# Patient Record
Sex: FEMALE | Race: WHITE | NOT HISPANIC OR LATINO
[De-identification: names, ages, dates, MRNs, and addresses within clinical notes are randomized per-mention and may not be internally consistent; named-entity substitution may affect disease eponyms.]

---

## 2017-10-05 ENCOUNTER — RX RENEWAL (OUTPATIENT)
Age: 72
End: 2017-10-05

## 2017-10-13 ENCOUNTER — APPOINTMENT (OUTPATIENT)
Dept: FAMILY MEDICINE | Facility: CLINIC | Age: 72
End: 2017-10-13
Payer: MEDICARE

## 2017-10-13 VITALS
OXYGEN SATURATION: 98 % | DIASTOLIC BLOOD PRESSURE: 80 MMHG | RESPIRATION RATE: 16 BRPM | SYSTOLIC BLOOD PRESSURE: 148 MMHG | HEART RATE: 98 BPM | BODY MASS INDEX: 19.66 KG/M2 | HEIGHT: 65 IN | TEMPERATURE: 98.6 F | WEIGHT: 118 LBS

## 2017-10-13 VITALS — DIASTOLIC BLOOD PRESSURE: 72 MMHG | SYSTOLIC BLOOD PRESSURE: 132 MMHG

## 2017-10-13 PROCEDURE — 99213 OFFICE O/P EST LOW 20 MIN: CPT

## 2017-10-13 RX ORDER — METOPROLOL SUCCINATE 25 MG/1
25 TABLET, EXTENDED RELEASE ORAL
Qty: 90 | Refills: 0 | Status: ACTIVE | COMMUNITY
Start: 2016-10-21

## 2017-10-19 ENCOUNTER — RX RENEWAL (OUTPATIENT)
Age: 72
End: 2017-10-19

## 2017-10-31 ENCOUNTER — RX RENEWAL (OUTPATIENT)
Age: 72
End: 2017-10-31

## 2017-11-08 ENCOUNTER — RX RENEWAL (OUTPATIENT)
Age: 72
End: 2017-11-08

## 2017-11-09 ENCOUNTER — RX RENEWAL (OUTPATIENT)
Age: 72
End: 2017-11-09

## 2017-12-22 ENCOUNTER — APPOINTMENT (OUTPATIENT)
Dept: FAMILY MEDICINE | Facility: CLINIC | Age: 72
End: 2017-12-22
Payer: MEDICARE

## 2017-12-22 VITALS
DIASTOLIC BLOOD PRESSURE: 84 MMHG | BODY MASS INDEX: 19.66 KG/M2 | HEART RATE: 83 BPM | RESPIRATION RATE: 16 BRPM | OXYGEN SATURATION: 98 % | WEIGHT: 118 LBS | HEIGHT: 65 IN | SYSTOLIC BLOOD PRESSURE: 158 MMHG

## 2017-12-22 VITALS — DIASTOLIC BLOOD PRESSURE: 80 MMHG | SYSTOLIC BLOOD PRESSURE: 136 MMHG

## 2017-12-22 PROCEDURE — 99213 OFFICE O/P EST LOW 20 MIN: CPT

## 2018-01-03 ENCOUNTER — CLINICAL ADVICE (OUTPATIENT)
Age: 73
End: 2018-01-03

## 2018-01-08 ENCOUNTER — CLINICAL ADVICE (OUTPATIENT)
Age: 73
End: 2018-01-08

## 2018-01-08 DIAGNOSIS — J45.20 MILD INTERMITTENT ASTHMA, UNCOMPLICATED: ICD-10-CM

## 2018-01-18 ENCOUNTER — CLINICAL ADVICE (OUTPATIENT)
Age: 73
End: 2018-01-18

## 2018-01-31 ENCOUNTER — RX RENEWAL (OUTPATIENT)
Age: 73
End: 2018-01-31

## 2018-02-05 ENCOUNTER — RX RENEWAL (OUTPATIENT)
Age: 73
End: 2018-02-05

## 2018-02-20 ENCOUNTER — RX RENEWAL (OUTPATIENT)
Age: 73
End: 2018-02-20

## 2018-03-01 ENCOUNTER — APPOINTMENT (OUTPATIENT)
Dept: FAMILY MEDICINE | Facility: CLINIC | Age: 73
End: 2018-03-01
Payer: MEDICARE

## 2018-03-01 VITALS
HEIGHT: 65 IN | BODY MASS INDEX: 19.66 KG/M2 | WEIGHT: 118 LBS | OXYGEN SATURATION: 98 % | DIASTOLIC BLOOD PRESSURE: 84 MMHG | HEART RATE: 76 BPM | RESPIRATION RATE: 14 BRPM | SYSTOLIC BLOOD PRESSURE: 144 MMHG

## 2018-03-01 VITALS — DIASTOLIC BLOOD PRESSURE: 72 MMHG | SYSTOLIC BLOOD PRESSURE: 132 MMHG

## 2018-03-01 PROCEDURE — 99213 OFFICE O/P EST LOW 20 MIN: CPT

## 2018-03-07 ENCOUNTER — RX RENEWAL (OUTPATIENT)
Age: 73
End: 2018-03-07

## 2018-04-04 ENCOUNTER — CLINICAL ADVICE (OUTPATIENT)
Age: 73
End: 2018-04-04

## 2018-04-09 ENCOUNTER — RX RENEWAL (OUTPATIENT)
Age: 73
End: 2018-04-09

## 2018-04-09 DIAGNOSIS — L20.9 ATOPIC DERMATITIS, UNSPECIFIED: ICD-10-CM

## 2018-04-23 ENCOUNTER — RX RENEWAL (OUTPATIENT)
Age: 73
End: 2018-04-23

## 2018-05-08 ENCOUNTER — APPOINTMENT (OUTPATIENT)
Dept: FAMILY MEDICINE | Facility: CLINIC | Age: 73
End: 2018-05-08
Payer: MEDICARE

## 2018-05-08 VITALS
HEART RATE: 69 BPM | HEIGHT: 65 IN | OXYGEN SATURATION: 98 % | RESPIRATION RATE: 14 BRPM | SYSTOLIC BLOOD PRESSURE: 134 MMHG | DIASTOLIC BLOOD PRESSURE: 82 MMHG | BODY MASS INDEX: 19.66 KG/M2 | WEIGHT: 118 LBS

## 2018-05-08 PROCEDURE — 99213 OFFICE O/P EST LOW 20 MIN: CPT

## 2018-05-08 RX ORDER — TOBRAMYCIN 3 MG/ML
0.3 SOLUTION/ DROPS OPHTHALMIC 3 TIMES DAILY
Qty: 10 | Refills: 0 | Status: DISCONTINUED | COMMUNITY
Start: 2017-10-19 | End: 2018-05-08

## 2018-05-08 RX ORDER — SULFAMETHOXAZOLE AND TRIMETHOPRIM 800; 160 MG/1; MG/1
800-160 TABLET ORAL TWICE DAILY
Qty: 28 | Refills: 1 | Status: DISCONTINUED | COMMUNITY
Start: 2017-10-13 | End: 2018-05-08

## 2018-05-08 RX ORDER — SULFAMETHOXAZOLE AND TRIMETHOPRIM 800; 160 MG/1; MG/1
800-160 TABLET ORAL TWICE DAILY
Qty: 28 | Refills: 1 | Status: DISCONTINUED | COMMUNITY
Start: 2017-05-20 | End: 2018-05-08

## 2018-05-08 RX ORDER — PROCHLORPERAZINE MALEATE 10 MG/1
10 TABLET ORAL
Qty: 30 | Refills: 0 | Status: DISCONTINUED | COMMUNITY
Start: 2017-02-27 | End: 2018-05-08

## 2018-05-18 ENCOUNTER — RX RENEWAL (OUTPATIENT)
Age: 73
End: 2018-05-18

## 2018-05-23 ENCOUNTER — APPOINTMENT (OUTPATIENT)
Dept: FAMILY MEDICINE | Facility: CLINIC | Age: 73
End: 2018-05-23
Payer: MEDICARE

## 2018-05-23 VITALS
BODY MASS INDEX: 19.99 KG/M2 | HEART RATE: 77 BPM | OXYGEN SATURATION: 97 % | WEIGHT: 120 LBS | DIASTOLIC BLOOD PRESSURE: 74 MMHG | SYSTOLIC BLOOD PRESSURE: 138 MMHG | RESPIRATION RATE: 14 BRPM | HEIGHT: 65 IN

## 2018-05-23 PROCEDURE — 99213 OFFICE O/P EST LOW 20 MIN: CPT

## 2018-05-23 RX ORDER — PREDNISONE 20 MG/1
20 TABLET ORAL
Qty: 6 | Refills: 0 | Status: DISCONTINUED | COMMUNITY
Start: 2018-01-14 | End: 2018-05-23

## 2018-05-23 RX ORDER — CEPHALEXIN 500 MG/1
500 CAPSULE ORAL 4 TIMES DAILY
Qty: 56 | Refills: 1 | Status: DISCONTINUED | COMMUNITY
Start: 2017-10-05 | End: 2018-05-23

## 2018-05-23 RX ORDER — SULFAMETHOXAZOLE AND TRIMETHOPRIM 800; 160 MG/1; MG/1
800-160 TABLET ORAL TWICE DAILY
Qty: 28 | Refills: 1 | Status: COMPLETED | COMMUNITY
Start: 2018-05-23 | End: 2018-06-20

## 2018-05-23 NOTE — HISTORY OF PRESENT ILLNESS
[FreeTextEntry8] : pt presents for hot st, ha, hard to breath used inhaler for past 2 days and doesn’t feel so tight, losing her voice. x2days.  She has been taking the cephalexin.  She has a sore throat for the past 2 days.  She had tightness in her chest but used the symbicort and proair and is breathing easier.  Her ears feel clogged

## 2018-05-23 NOTE — ASSESSMENT
[FreeTextEntry1] : she will stop the cephalexin and start bactrim and continue other medications without change, she was reminded to schedule a screening mammogram and was given the stool FIT kit

## 2018-05-23 NOTE — PHYSICAL EXAM
[No Acute Distress] : no acute distress [Well Nourished] : well nourished [Well Developed] : well developed [Well-Appearing] : well-appearing [Normal Voice/Communication] : normal voice/communication [Normal Sclera/Conjunctiva] : normal sclera/conjunctiva [Supple] : supple [No Lymphadenopathy] : no lymphadenopathy [No Respiratory Distress] : no respiratory distress  [Clear to Auscultation] : lungs were clear to auscultation bilaterally [No Accessory Muscle Use] : no accessory muscle use [Normal Rate] : normal rate  [Regular Rhythm] : with a regular rhythm [Normal S1, S2] : normal S1 and S2 [Speech Grossly Normal] : speech grossly normal [Memory Grossly Normal] : memory grossly normal [Normal Affect] : the affect was normal [Normal Mood] : the mood was normal [Normal Insight/Judgement] : insight and judgment were intact [de-identified] : TM's dull, decreased light reflexes, mild erythema of posterior pharynx

## 2018-05-23 NOTE — REVIEW OF SYSTEMS
[Chills] : chills [Fatigue] : fatigue [Earache] : earache [Hoarseness] : hoarseness [Nasal Discharge] : nasal discharge [Sore Throat] : sore throat [Shortness Of Breath] : shortness of breath [Wheezing] : wheezing [Cough] : cough [Negative] : Heme/Lymph [Fever] : no fever [Dyspnea on Exertion] : no dyspnea on exertion

## 2018-05-31 ENCOUNTER — RX RENEWAL (OUTPATIENT)
Age: 73
End: 2018-05-31

## 2018-06-27 ENCOUNTER — RX RENEWAL (OUTPATIENT)
Age: 73
End: 2018-06-27

## 2018-07-17 ENCOUNTER — RX RENEWAL (OUTPATIENT)
Age: 73
End: 2018-07-17

## 2018-07-17 RX ORDER — SULFAMETHOXAZOLE AND TRIMETHOPRIM 800; 160 MG/1; MG/1
800-160 TABLET ORAL TWICE DAILY
Qty: 28 | Refills: 1 | Status: COMPLETED | COMMUNITY
Start: 2018-07-17 | End: 2018-08-14

## 2018-08-29 ENCOUNTER — APPOINTMENT (OUTPATIENT)
Dept: FAMILY MEDICINE | Facility: CLINIC | Age: 73
End: 2018-08-29
Payer: MEDICARE

## 2018-08-29 VITALS
TEMPERATURE: 98.6 F | SYSTOLIC BLOOD PRESSURE: 128 MMHG | WEIGHT: 120 LBS | HEART RATE: 72 BPM | RESPIRATION RATE: 14 BRPM | DIASTOLIC BLOOD PRESSURE: 70 MMHG | BODY MASS INDEX: 19.97 KG/M2 | OXYGEN SATURATION: 97 %

## 2018-08-29 DIAGNOSIS — Z87.09 PERSONAL HISTORY OF OTHER DISEASES OF THE RESPIRATORY SYSTEM: ICD-10-CM

## 2018-08-29 LAB — CYTOLOGY CVX/VAG DOC THIN PREP: NORMAL

## 2018-08-29 PROCEDURE — 99213 OFFICE O/P EST LOW 20 MIN: CPT

## 2018-08-29 RX ORDER — SULFAMETHOXAZOLE AND TRIMETHOPRIM 800; 160 MG/1; MG/1
800-160 TABLET ORAL TWICE DAILY
Qty: 28 | Refills: 1 | Status: COMPLETED | COMMUNITY
Start: 2018-08-29 | End: 2018-09-26

## 2018-08-29 RX ORDER — CEPHALEXIN 500 MG/1
500 CAPSULE ORAL 4 TIMES DAILY
Qty: 56 | Refills: 1 | Status: DISCONTINUED | COMMUNITY
Start: 2018-06-27 | End: 2018-08-29

## 2018-08-29 NOTE — HISTORY OF PRESENT ILLNESS
[FreeTextEntry8] : Pt is here for poss sinus infection. Pt c/o sinus pressure, burning throat, scratchy voice on/off for about 2 months. For the past 2 months she has had intermittent pressure in her face and burning in her throat.  She isn't taking the cephalexin but did start the Bactrim a few days ago.  She plans to schedule a mammogram and perform the stool FIT test.  The cardiologist ordered routine labs for her.

## 2018-08-29 NOTE — PHYSICAL EXAM
[No Acute Distress] : no acute distress [Well Nourished] : well nourished [Well Developed] : well developed [Well-Appearing] : well-appearing [Normal Voice/Communication] : normal voice/communication [Normal Sclera/Conjunctiva] : normal sclera/conjunctiva [Normal Outer Ear/Nose] : the outer ears and nose were normal in appearance [Supple] : supple [No Lymphadenopathy] : no lymphadenopathy [No Respiratory Distress] : no respiratory distress  [Clear to Auscultation] : lungs were clear to auscultation bilaterally [No Accessory Muscle Use] : no accessory muscle use [Normal Rate] : normal rate  [Regular Rhythm] : with a regular rhythm [Normal S1, S2] : normal S1 and S2 [Speech Grossly Normal] : speech grossly normal [Memory Grossly Normal] : memory grossly normal [Normal Affect] : the affect was normal [Normal Mood] : the mood was normal [Normal Insight/Judgement] : insight and judgment were intact [de-identified] : TM's dull bilaterally,mild swelling and redness in nasal passages bilaterally, mild erythema of posterior pharynx

## 2018-08-29 NOTE — REVIEW OF SYSTEMS
[Earache] : earache [Nasal Discharge] : nasal discharge [Sore Throat] : sore throat [Postnasal Drip] : postnasal drip [Headache] : headache [Negative] : Heme/Lymph [Fever] : no fever [Chills] : no chills [Fatigue] : no fatigue [Shortness Of Breath] : no shortness of breath [Wheezing] : no wheezing [Cough] : no cough

## 2018-09-07 ENCOUNTER — RX RENEWAL (OUTPATIENT)
Age: 73
End: 2018-09-07

## 2018-09-15 ENCOUNTER — RX RENEWAL (OUTPATIENT)
Age: 73
End: 2018-09-15

## 2018-09-17 ENCOUNTER — CLINICAL ADVICE (OUTPATIENT)
Age: 73
End: 2018-09-17

## 2018-09-19 ENCOUNTER — CLINICAL ADVICE (OUTPATIENT)
Age: 73
End: 2018-09-19

## 2018-10-06 ENCOUNTER — RX RENEWAL (OUTPATIENT)
Age: 73
End: 2018-10-06

## 2018-10-08 ENCOUNTER — CLINICAL ADVICE (OUTPATIENT)
Age: 73
End: 2018-10-08

## 2018-10-10 ENCOUNTER — APPOINTMENT (OUTPATIENT)
Dept: FAMILY MEDICINE | Facility: CLINIC | Age: 73
End: 2018-10-10
Payer: MEDICARE

## 2018-10-10 VITALS
DIASTOLIC BLOOD PRESSURE: 60 MMHG | OXYGEN SATURATION: 96 % | HEART RATE: 50 BPM | WEIGHT: 115 LBS | BODY MASS INDEX: 19.14 KG/M2 | RESPIRATION RATE: 14 BRPM | SYSTOLIC BLOOD PRESSURE: 132 MMHG

## 2018-10-10 VITALS — DIASTOLIC BLOOD PRESSURE: 70 MMHG | SYSTOLIC BLOOD PRESSURE: 130 MMHG

## 2018-10-10 VITALS — HEART RATE: 72 BPM

## 2018-10-10 DIAGNOSIS — Z87.898 PERSONAL HISTORY OF OTHER SPECIFIED CONDITIONS: ICD-10-CM

## 2018-10-10 DIAGNOSIS — Z95.0 PRESENCE OF CARDIAC PACEMAKER: ICD-10-CM

## 2018-10-10 LAB
BILIRUB UR QL STRIP: NORMAL
GLUCOSE UR-MCNC: NORMAL
HCG UR QL: 0.2 EU/DL
HGB UR QL STRIP.AUTO: NORMAL
KETONES UR-MCNC: NORMAL
LEUKOCYTE ESTERASE UR QL STRIP: NORMAL
NITRITE UR QL STRIP: NORMAL
PH UR STRIP: 5.5
PROT UR STRIP-MCNC: NORMAL
SP GR UR STRIP: 1.01

## 2018-10-10 PROCEDURE — 99495 TRANSJ CARE MGMT MOD F2F 14D: CPT | Mod: 25

## 2018-10-10 PROCEDURE — 81003 URINALYSIS AUTO W/O SCOPE: CPT | Mod: QW

## 2018-10-10 RX ORDER — BUDESONIDE AND FORMOTEROL FUMARATE DIHYDRATE 80; 4.5 UG/1; UG/1
80-4.5 AEROSOL RESPIRATORY (INHALATION) TWICE DAILY
Qty: 1 | Refills: 5 | Status: DISCONTINUED | COMMUNITY
Start: 2018-01-08 | End: 2018-10-10

## 2018-10-10 RX ORDER — SULFAMETHOXAZOLE AND TRIMETHOPRIM 800; 160 MG/1; MG/1
800-160 TABLET ORAL TWICE DAILY
Qty: 14 | Refills: 0 | Status: DISCONTINUED | COMMUNITY
Start: 2018-10-06 | End: 2018-10-10

## 2018-10-10 NOTE — PHYSICAL EXAM
[No Acute Distress] : no acute distress [Well Nourished] : well nourished [Well Developed] : well developed [Well-Appearing] : well-appearing [Normal Voice/Communication] : normal voice/communication [No Respiratory Distress] : no respiratory distress  [Clear to Auscultation] : lungs were clear to auscultation bilaterally [No Accessory Muscle Use] : no accessory muscle use [Normal Rate] : normal rate  [Regular Rhythm] : with a regular rhythm [Normal S1, S2] : normal S1 and S2 [No Murmur] : no murmur heard [No Carotid Bruits] : no carotid bruits [No Edema] : there was no peripheral edema [Soft] : abdomen soft [Non Tender] : non-tender [Non-distended] : non-distended [No Masses] : no abdominal mass palpated [No HSM] : no HSM [Normal Bowel Sounds] : normal bowel sounds [No Hernias] : no hernias [No CVA Tenderness] : no CVA  tenderness [Normal Gait] : normal gait [Speech Grossly Normal] : speech grossly normal [Memory Grossly Normal] : memory grossly normal [Normal Affect] : the affect was normal [Normal Mood] : the mood was normal [Normal Insight/Judgement] : insight and judgment were intact [de-identified] : healed surgical incision in left upper chest

## 2018-10-10 NOTE — HISTORY OF PRESENT ILLNESS
[FreeTextEntry1] : Pt is here for a HFU. Pt went to [facility] on 9/11/2018 for pacemaker and was discharged on 9/14/2018. Pt is also here for a UTI. Pt is c/o bleeding that subsided and pelvic pain for about for 4 days.  [de-identified] : She was seen at [facility] in september due to persistant low heart rate when she was checking it at the pharmacy.  She was admitted and had a pacemaker placed.  She just had a CXR as one of the leads may be displaced.  Her recent BP's are in the 120-130/60 range and her heart rate is in the 70-90 range. The urinary symptoms and pain in her bladder are slowly improving.

## 2018-10-10 NOTE — ASSESSMENT
[FreeTextEntry1] : urine C&S was ordered, I renewed the cephalexin, she will start cranberry pills, no other medication changes were made, she will go for her mammogram when the pacemaker incision is more healed, she will do the stool FIT kit and follow up in 3 months or sooner prn

## 2018-10-10 NOTE — REVIEW OF SYSTEMS
[Dysuria] : dysuria [Hematuria] : hematuria [Frequency] : frequency [Anxiety] : anxiety [Negative] : Heme/Lymph [Incontinence] : no incontinence [FreeTextEntry5] : slow heart rate

## 2018-10-12 LAB — BACTERIA UR CULT: NORMAL

## 2018-10-13 ENCOUNTER — CLINICAL ADVICE (OUTPATIENT)
Age: 73
End: 2018-10-13

## 2018-10-15 ENCOUNTER — CLINICAL ADVICE (OUTPATIENT)
Age: 73
End: 2018-10-15

## 2018-10-18 ENCOUNTER — CLINICAL ADVICE (OUTPATIENT)
Age: 73
End: 2018-10-18

## 2018-10-20 ENCOUNTER — CLINICAL ADVICE (OUTPATIENT)
Age: 73
End: 2018-10-20

## 2018-10-25 ENCOUNTER — CLINICAL ADVICE (OUTPATIENT)
Age: 73
End: 2018-10-25

## 2018-10-27 ENCOUNTER — RX RENEWAL (OUTPATIENT)
Age: 73
End: 2018-10-27

## 2018-10-29 ENCOUNTER — CLINICAL ADVICE (OUTPATIENT)
Age: 73
End: 2018-10-29

## 2018-10-31 ENCOUNTER — CLINICAL ADVICE (OUTPATIENT)
Age: 73
End: 2018-10-31

## 2018-11-06 ENCOUNTER — APPOINTMENT (OUTPATIENT)
Dept: FAMILY MEDICINE | Facility: CLINIC | Age: 73
End: 2018-11-06
Payer: MEDICARE

## 2018-11-06 VITALS
RESPIRATION RATE: 14 BRPM | WEIGHT: 112 LBS | DIASTOLIC BLOOD PRESSURE: 70 MMHG | HEART RATE: 69 BPM | SYSTOLIC BLOOD PRESSURE: 142 MMHG | BODY MASS INDEX: 18.64 KG/M2 | OXYGEN SATURATION: 95 %

## 2018-11-06 LAB
BILIRUB UR QL STRIP: NORMAL
GLUCOSE UR-MCNC: NORMAL
HCG UR QL: 0.2 EU/DL
HGB UR QL STRIP.AUTO: NORMAL
KETONES UR-MCNC: NORMAL
LEUKOCYTE ESTERASE UR QL STRIP: NORMAL
NITRITE UR QL STRIP: NORMAL
PH UR STRIP: 5.5
PROT UR STRIP-MCNC: NORMAL
SP GR UR STRIP: 1

## 2018-11-06 PROCEDURE — 81003 URINALYSIS AUTO W/O SCOPE: CPT | Mod: QW

## 2018-11-06 PROCEDURE — 99213 OFFICE O/P EST LOW 20 MIN: CPT | Mod: 25

## 2018-11-06 NOTE — ASSESSMENT
[FreeTextEntry1] : she was advised to continue the cefdinir bid and to add Bactrim DS 1 daily in the evening, she was given emotional support and encouraged to eat every few hours, she will follow up with the urologist as scheduled and may call me for an extension of the antibiotics if needed

## 2018-11-06 NOTE — PHYSICAL EXAM
[No Acute Distress] : no acute distress [Well Developed] : well developed [Normal Sclera/Conjunctiva] : normal sclera/conjunctiva [Normal Oropharynx] : the oropharynx was normal [Supple] : supple [No Lymphadenopathy] : no lymphadenopathy [No Respiratory Distress] : no respiratory distress  [Clear to Auscultation] : lungs were clear to auscultation bilaterally [No Accessory Muscle Use] : no accessory muscle use [Normal Rate] : normal rate  [Regular Rhythm] : with a regular rhythm [Normal S1, S2] : normal S1 and S2 [Normal Gait] : normal gait [de-identified] : TM's dull, decreased light reflexes bilaterally [de-identified] : she is anxious

## 2018-11-06 NOTE — REVIEW OF SYSTEMS
[Chills] : chills [Fatigue] : fatigue [Recent Change In Weight] : ~T recent weight change [Earache] : earache [Nasal Discharge] : nasal discharge [Postnasal Drip] : postnasal drip [Abdominal Pain] : abdominal pain [Dysuria] : dysuria [Hematuria] : hematuria [Frequency] : frequency [Joint Pain] : joint pain [Muscle Weakness] : muscle weakness [Muscle Pain] : muscle pain [Dizziness] : dizziness [Anxiety] : anxiety [Negative] : Heme/Lymph [FreeTextEntry2] : weight los [FreeTextEntry7] : decreaed appetite

## 2018-11-06 NOTE — HISTORY OF PRESENT ILLNESS
[FreeTextEntry1] : Pt is here for poss ear infection. Pt is c/o bilateral ear pain and fluid build up for about 2 days. Pt would like urine rechecked.  [de-identified] : Her ears are feeling clogged.  She is taking the cefdinir.  The bladder and leg pain is slowly improving.  She is scheduled for the cystoscopy on friday and had the CT of the kidneys done at [facility] yesterday.  She lost 8 pounds since being in the hospital for the pacemaker.  She doesn't have a good appetite.

## 2018-11-12 ENCOUNTER — RX RENEWAL (OUTPATIENT)
Age: 73
End: 2018-11-12

## 2018-11-26 ENCOUNTER — APPOINTMENT (OUTPATIENT)
Dept: FAMILY MEDICINE | Facility: CLINIC | Age: 73
End: 2018-11-26
Payer: MEDICARE

## 2018-11-26 VITALS
OXYGEN SATURATION: 97 % | SYSTOLIC BLOOD PRESSURE: 122 MMHG | RESPIRATION RATE: 14 BRPM | DIASTOLIC BLOOD PRESSURE: 70 MMHG | HEART RATE: 83 BPM

## 2018-11-26 PROCEDURE — 99213 OFFICE O/P EST LOW 20 MIN: CPT | Mod: 25

## 2018-11-26 PROCEDURE — 96372 THER/PROPH/DIAG INJ SC/IM: CPT

## 2018-11-26 RX ORDER — CEPHALEXIN 500 MG/1
500 CAPSULE ORAL 4 TIMES DAILY
Qty: 56 | Refills: 1 | Status: DISCONTINUED | COMMUNITY
Start: 2018-10-10 | End: 2018-11-26

## 2018-11-26 RX ORDER — CYANOCOBALAMIN 1000 UG/ML
1000 INJECTION INTRAMUSCULAR; SUBCUTANEOUS
Qty: 0 | Refills: 0 | Status: COMPLETED | OUTPATIENT
Start: 2018-11-26

## 2018-11-26 RX ADMIN — CYANOCOBALAMIN 0 MCG/ML: 1000 INJECTION INTRAMUSCULAR; SUBCUTANEOUS at 00:00

## 2018-11-26 NOTE — HISTORY OF PRESENT ILLNESS
[FreeTextEntry8] : pt c/o ear aches +sinus pressure -cough -st -congestion -temp x 2 weeks  She is tolerating the klonopin and sertraline so far.  She has been forcing herself to eat.  Her weight at home was 105.  She has been constipated and is taking metamucil.  She still has some fluid in the right ear and sinus congestion.  She is on cefdinir.  She is scheduled for the cystoscopy on friday.  She started mental health counseling last wednesday.

## 2018-11-26 NOTE — REVIEW OF SYSTEMS
[Fever] : no fever [Chills] : no chills [Fatigue] : fatigue [Recent Change In Weight] : ~T recent weight change [Earache] : earache [Nasal Discharge] : nasal discharge [Sore Throat] : sore throat [Postnasal Drip] : postnasal drip [Shortness Of Breath] : no shortness of breath [Wheezing] : no wheezing [Cough] : no cough [Suicidal] : not suicidal [Anxiety] : anxiety [Depression] : depression [Negative] : Heme/Lymph [FreeTextEntry2] : weight loss [FreeTextEntry8] : pain in bladder area

## 2018-11-26 NOTE — PHYSICAL EXAM
[No Acute Distress] : no acute distress [Well Developed] : well developed [Normal Sclera/Conjunctiva] : normal sclera/conjunctiva [Normal Oropharynx] : the oropharynx was normal [Supple] : supple [No Lymphadenopathy] : no lymphadenopathy [No Respiratory Distress] : no respiratory distress  [Clear to Auscultation] : lungs were clear to auscultation bilaterally [No Accessory Muscle Use] : no accessory muscle use [Normal Rate] : normal rate  [Regular Rhythm] : with a regular rhythm [Normal S1, S2] : normal S1 and S2 [Memory Grossly Normal] : memory grossly normal [Normal Affect] : the affect was normal [Normal Insight/Judgement] : insight and judgment were intact [de-identified] : TM's dull, redness and swelling in nasal passages, worse on right [de-identified] : she is mildly anxious

## 2018-11-26 NOTE — ASSESSMENT
[FreeTextEntry1] : she was advised to stop the cefdinir and to take Bactrim, she will follow up with the urologist and psychiatric providers as scheduled, she was given emotional support, B12 injection was given and she will follow up if symptoms persist or worsen

## 2018-11-28 ENCOUNTER — RX RENEWAL (OUTPATIENT)
Age: 73
End: 2018-11-28

## 2018-12-01 ENCOUNTER — CLINICAL ADVICE (OUTPATIENT)
Age: 73
End: 2018-12-01

## 2018-12-10 ENCOUNTER — RX RENEWAL (OUTPATIENT)
Age: 73
End: 2018-12-10

## 2018-12-11 ENCOUNTER — APPOINTMENT (OUTPATIENT)
Dept: FAMILY MEDICINE | Facility: CLINIC | Age: 73
End: 2018-12-11
Payer: MEDICARE

## 2018-12-11 VITALS
DIASTOLIC BLOOD PRESSURE: 60 MMHG | OXYGEN SATURATION: 96 % | HEIGHT: 65 IN | HEART RATE: 72 BPM | BODY MASS INDEX: 16.5 KG/M2 | WEIGHT: 99 LBS | SYSTOLIC BLOOD PRESSURE: 120 MMHG | RESPIRATION RATE: 14 BRPM

## 2018-12-11 PROCEDURE — 92567 TYMPANOMETRY: CPT

## 2018-12-11 PROCEDURE — 99213 OFFICE O/P EST LOW 20 MIN: CPT | Mod: 25

## 2018-12-11 NOTE — HEALTH RISK ASSESSMENT
[Intercurrent hospitalizations] : was admitted to the hospital  [] : No [de-identified] : urology,cardiology

## 2018-12-11 NOTE — ASSESSMENT
[FreeTextEntry1] : Tympanogram performed by Rupali PARSON  and results reviewed with patient.\par ear irrigation performed and tolerated well good results. Patient satisfied. \par No ear infection noted after ear irrigation.She did not want any drops or cortisone for pain. she will let ear dry out

## 2018-12-11 NOTE — HISTORY OF PRESENT ILLNESS
[FreeTextEntry8] : Patient C/O right ear being clogged started 1 week\par +loss of hearing in right ear

## 2018-12-11 NOTE — PHYSICAL EXAM
[Cachectic] : cachexia was observed [Pressed] : was pressed [Tremulous] : was tremulous [Whispered] : was whispered [Normal Rhythm/Effort] : normal respiratory rhythm and effort [Clear Bilaterally] : the lungs were clear to auscultation bilaterally [Normal to Percussion] : the lungs were normal to percussion [Normal] : palpation of the chest was normal [Normal Rate] : normal [Normal S1] : normal S1 [Normal S2] : normal S2 [S3] : no S3 [S4] : no S4 [No Murmur] : no murmurs heard [No Pitting Edema] : no pitting edema present [Right Carotid Bruit] : no bruit heard over the right carotid [Left Carotid Bruit] : no bruit heard over the left carotid [Right Femoral Bruit] : no bruit heard over the right femoral artery [Left Femoral Bruit] : no bruit heard over the left femoral artery [2+] : left 2+ [No Abnormalities] : the abdominal aorta was not enlarged and no bruit was heard [de-identified] : tm blocked with ear wax

## 2018-12-12 ENCOUNTER — CLINICAL ADVICE (OUTPATIENT)
Age: 73
End: 2018-12-12

## 2018-12-13 ENCOUNTER — APPOINTMENT (OUTPATIENT)
Dept: FAMILY MEDICINE | Facility: CLINIC | Age: 73
End: 2018-12-13
Payer: MEDICARE

## 2018-12-13 VITALS
DIASTOLIC BLOOD PRESSURE: 70 MMHG | RESPIRATION RATE: 14 BRPM | WEIGHT: 99 LBS | OXYGEN SATURATION: 97 % | HEART RATE: 51 BPM | HEIGHT: 65 IN | BODY MASS INDEX: 16.5 KG/M2 | SYSTOLIC BLOOD PRESSURE: 118 MMHG

## 2018-12-13 LAB — S PYO AG SPEC QL IA: NORMAL

## 2018-12-13 PROCEDURE — 99214 OFFICE O/P EST MOD 30 MIN: CPT | Mod: 25

## 2018-12-13 PROCEDURE — 87880 STREP A ASSAY W/OPTIC: CPT | Mod: QW

## 2018-12-13 RX ORDER — CEFDINIR 300 MG/1
300 CAPSULE ORAL
Qty: 28 | Refills: 1 | Status: DISCONTINUED | COMMUNITY
Start: 2018-10-18 | End: 2018-12-13

## 2018-12-13 RX ORDER — SULFAMETHOXAZOLE AND TRIMETHOPRIM 800; 160 MG/1; MG/1
800-160 TABLET ORAL TWICE DAILY
Qty: 28 | Refills: 1 | Status: DISCONTINUED | COMMUNITY
Start: 2018-11-26 | End: 2018-12-13

## 2018-12-13 RX ORDER — CLONAZEPAM 0.5 MG/1
0.5 TABLET ORAL
Qty: 30 | Refills: 0 | Status: DISCONTINUED | COMMUNITY
Start: 2018-11-16 | End: 2018-12-13

## 2018-12-13 RX ORDER — SERTRALINE HYDROCHLORIDE 50 MG/1
50 TABLET, FILM COATED ORAL
Qty: 30 | Refills: 0 | Status: DISCONTINUED | COMMUNITY
Start: 2018-11-16 | End: 2018-12-13

## 2018-12-13 NOTE — REVIEW OF SYSTEMS
[Fatigue] : fatigue [Sore Throat] : sore throat [Constipation] : constipation [Muscle Weakness] : muscle weakness [Anxiety] : anxiety [Negative] : Heme/Lymph [Fever] : no fever [Chills] : no chills [Recent Change In Weight] : ~T no recent weight change [Earache] : no earache [Hoarseness] : no hoarseness [Nasal Discharge] : no nasal discharge [Postnasal Drip] : no postnasal drip [Cough] : no cough [Abdominal Pain] : no abdominal pain

## 2018-12-13 NOTE — HISTORY OF PRESENT ILLNESS
[FreeTextEntry8] : +sore throat on and off X 1 week.  She denies any ear pain, nasal congestion or postnasal drip.  She denies any fever.  She states that she had a BM this morning and there was no blood.  For the past 2 months she has been constipated.  She has been drinking protein shakes.  She feels weak and she is very anxious.  She is trying to see a mental health specialist who will talk to her on the phone, she doesn't want to go back to [place].  When she was in the ER the IV had infiltrated her right arm and it was red and bruised.  She stopped the sertraline and clonazepam due to side effects.  She is taking the diazepam at night that was prescribed at the ER.

## 2018-12-13 NOTE — PHYSICAL EXAM
[Normal Outer Ear/Nose] : the outer ears and nose were normal in appearance [Normal TMs] : both tympanic membranes were normal [Supple] : supple [No Lymphadenopathy] : no lymphadenopathy [No Respiratory Distress] : no respiratory distress  [Clear to Auscultation] : lungs were clear to auscultation bilaterally [No Accessory Muscle Use] : no accessory muscle use [Normal Rate] : normal rate  [Regular Rhythm] : with a regular rhythm [Normal S1, S2] : normal S1 and S2 [de-identified] : this is thin and very anxious [de-identified] : mild erythema of posterior pharynx [de-identified] : she is very anxious

## 2018-12-13 NOTE — ASSESSMENT
[FreeTextEntry1] : she was advised to stop the cefdinir and to start cephalexin that she has at home, she is insisting that she needs an antibiotic or else the sore throat won't resolve, she will start mirtazapine at [facility] along with the diazepam which I renewed, I spent 30 minutes with her reassuring her that she will get well, she was advised to start miralax for the constipation and she was advised to start counseling and to follow up in 3 weeks, when she is improved emotionally she will be referred for a colonoscopy

## 2019-01-09 ENCOUNTER — APPOINTMENT (OUTPATIENT)
Dept: FAMILY MEDICINE | Facility: CLINIC | Age: 74
End: 2019-01-09
Payer: MEDICARE

## 2019-01-09 VITALS
BODY MASS INDEX: 18.33 KG/M2 | HEART RATE: 77 BPM | WEIGHT: 110 LBS | HEIGHT: 65 IN | DIASTOLIC BLOOD PRESSURE: 78 MMHG | SYSTOLIC BLOOD PRESSURE: 120 MMHG | RESPIRATION RATE: 14 BRPM | OXYGEN SATURATION: 97 %

## 2019-01-09 DIAGNOSIS — Z86.79 PERSONAL HISTORY OF OTHER DISEASES OF THE CIRCULATORY SYSTEM: ICD-10-CM

## 2019-01-09 PROCEDURE — 99213 OFFICE O/P EST LOW 20 MIN: CPT

## 2019-01-09 RX ORDER — PROCHLORPERAZINE MALEATE 10 MG/1
10 TABLET ORAL 3 TIMES DAILY
Qty: 30 | Refills: 2 | Status: DISCONTINUED | COMMUNITY
Start: 2018-05-31 | End: 2019-01-09

## 2019-01-09 RX ORDER — LOTEPREDNOL ETABONATE 5 MG/ML
0.5 SUSPENSION/ DROPS OPHTHALMIC
Qty: 5 | Refills: 0 | Status: DISCONTINUED | COMMUNITY
Start: 2018-04-10 | End: 2019-01-09

## 2019-01-09 RX ORDER — OXYBUTYNIN CHLORIDE 5 MG/1
5 TABLET ORAL
Qty: 15 | Refills: 0 | Status: DISCONTINUED | COMMUNITY
Start: 2018-12-02 | End: 2019-01-09

## 2019-01-09 RX ORDER — BEPOTASTINE BESILATE 15 MG/ML
1.5 SOLUTION/ DROPS OPHTHALMIC
Qty: 5 | Refills: 0 | Status: DISCONTINUED | COMMUNITY
Start: 2018-04-10 | End: 2019-01-09

## 2019-01-09 RX ORDER — DIAZEPAM 5 MG/1
5 TABLET ORAL
Qty: 10 | Refills: 0 | Status: DISCONTINUED | COMMUNITY
Start: 2018-12-02 | End: 2019-01-09

## 2019-01-09 NOTE — PHYSICAL EXAM
[No Acute Distress] : no acute distress [Well Nourished] : well nourished [Well Developed] : well developed [Well-Appearing] : well-appearing [Normal Voice/Communication] : normal voice/communication [Normal Sclera/Conjunctiva] : normal sclera/conjunctiva [Supple] : supple [No Lymphadenopathy] : no lymphadenopathy [No Respiratory Distress] : no respiratory distress  [Clear to Auscultation] : lungs were clear to auscultation bilaterally [No Accessory Muscle Use] : no accessory muscle use [Normal Rate] : normal rate  [Regular Rhythm] : with a regular rhythm [Normal S1, S2] : normal S1 and S2 [Speech Grossly Normal] : speech grossly normal [Memory Grossly Normal] : memory grossly normal [Normal Mood] : the mood was normal [Normal Insight/Judgement] : insight and judgment were intact [de-identified] : TM's dull, decreased light reflexes, mild erythema of posterior pharynx

## 2019-01-09 NOTE — ASSESSMENT
[FreeTextEntry1] : she was advised that I will renew the cefdinir one last time, she will continue current tx, I renewed the diazpem and checked I-STOP, ref #[id number], she was given emotional support and will follow up in 1 month

## 2019-01-09 NOTE — HISTORY OF PRESENT ILLNESS
[FreeTextEntry1] : patient present for 3 week follow up [de-identified] : She has gained 8 pounds.  The constipation resolved with metamucil and miralax and she has continued it and is also having walnut oil at night.  The muscle aches resolved.  She isn't taking the mirtazepam nightly, only if she can't sleep.  She is taking the diazepam nightly.  She is feeling a lot less anxious.  The bladder pain resolved.  She has a slight sore throat.  She is currently on the cefdinir and tolerating it well.

## 2019-01-09 NOTE — REVIEW OF SYSTEMS
[Recent Change In Weight] : ~T recent weight change [Nasal Discharge] : nasal discharge [Postnasal Drip] : postnasal drip [Insomnia] : insomnia [Anxiety] : anxiety [Negative] : Heme/Lymph [Fatigue] : no fatigue [Earache] : no earache [Cough] : no cough [Muscle Pain] : no muscle pain [Depression] : no depression

## 2019-01-28 ENCOUNTER — APPOINTMENT (OUTPATIENT)
Dept: FAMILY MEDICINE | Facility: CLINIC | Age: 74
End: 2019-01-28
Payer: MEDICARE

## 2019-01-28 VITALS
BODY MASS INDEX: 17.81 KG/M2 | OXYGEN SATURATION: 97 % | TEMPERATURE: 98.2 F | SYSTOLIC BLOOD PRESSURE: 130 MMHG | WEIGHT: 107 LBS | HEART RATE: 99 BPM | DIASTOLIC BLOOD PRESSURE: 70 MMHG | RESPIRATION RATE: 14 BRPM

## 2019-01-28 PROCEDURE — 99213 OFFICE O/P EST LOW 20 MIN: CPT

## 2019-01-28 RX ORDER — SULFAMETHOXAZOLE AND TRIMETHOPRIM 800; 160 MG/1; MG/1
800-160 TABLET ORAL TWICE DAILY
Qty: 28 | Refills: 1 | Status: COMPLETED | COMMUNITY
Start: 2019-01-28 | End: 2019-02-25

## 2019-01-28 RX ORDER — CEFDINIR 300 MG/1
300 CAPSULE ORAL
Qty: 28 | Refills: 1 | Status: DISCONTINUED | COMMUNITY
Start: 2019-01-09 | End: 2019-01-28

## 2019-01-28 NOTE — HISTORY OF PRESENT ILLNESS
[FreeTextEntry8] : pt c/o flu like sx +st +sinus pressure +body aches +ha +nausea -congestion -temp x 3 days.  She has a slight cough from postnasal drip.  She is sleeping well and is still taking the diazepam nightly.   She would like to renew the compazine.  Overall her BM's are normal now.

## 2019-01-28 NOTE — PHYSICAL EXAM
[No Acute Distress] : no acute distress [Well Nourished] : well nourished [Well Developed] : well developed [Well-Appearing] : well-appearing [Normal Voice/Communication] : normal voice/communication [Normal Sclera/Conjunctiva] : normal sclera/conjunctiva [Supple] : supple [No Lymphadenopathy] : no lymphadenopathy [No Respiratory Distress] : no respiratory distress  [Clear to Auscultation] : lungs were clear to auscultation bilaterally [No Accessory Muscle Use] : no accessory muscle use [Normal Rate] : normal rate  [Regular Rhythm] : with a regular rhythm [Normal S1, S2] : normal S1 and S2 [Speech Grossly Normal] : speech grossly normal [Memory Grossly Normal] : memory grossly normal [Normal Mood] : the mood was normal [Normal Insight/Judgement] : insight and judgment were intact [de-identified] : decreased light reflexes bilaterally, mild erythema of posterior pharynx, erythema and edema of right nasal passage

## 2019-01-28 NOTE — ASSESSMENT
[FreeTextEntry1] : she will stop the cefdinir and take bactrim and compazine as directed and follow up next month for her med check as scheduled

## 2019-01-28 NOTE — REVIEW OF SYSTEMS
[Chills] : chills [Fatigue] : fatigue [Earache] : earache [Nasal Discharge] : nasal discharge [Sore Throat] : sore throat [Postnasal Drip] : postnasal drip [Cough] : cough [Anxiety] : anxiety [Negative] : Heme/Lymph [Fever] : no fever [Shortness Of Breath] : no shortness of breath [Wheezing] : no wheezing [Dyspnea on Exertion] : no dyspnea on exertion [Depression] : no depression

## 2019-02-25 ENCOUNTER — APPOINTMENT (OUTPATIENT)
Dept: FAMILY MEDICINE | Facility: CLINIC | Age: 74
End: 2019-02-25
Payer: MEDICARE

## 2019-02-25 VITALS
HEART RATE: 91 BPM | SYSTOLIC BLOOD PRESSURE: 118 MMHG | RESPIRATION RATE: 14 BRPM | DIASTOLIC BLOOD PRESSURE: 80 MMHG | OXYGEN SATURATION: 97 %

## 2019-02-25 DIAGNOSIS — Z87.09 PERSONAL HISTORY OF OTHER DISEASES OF THE RESPIRATORY SYSTEM: ICD-10-CM

## 2019-02-25 PROCEDURE — 99213 OFFICE O/P EST LOW 20 MIN: CPT

## 2019-02-25 RX ORDER — ONDANSETRON 4 MG/1
4 TABLET ORAL
Qty: 15 | Refills: 0 | Status: DISCONTINUED | COMMUNITY
Start: 2018-01-14 | End: 2019-02-25

## 2019-02-25 NOTE — REVIEW OF SYSTEMS
[Earache] : earache [Nasal Discharge] : nasal discharge [Sore Throat] : sore throat [Postnasal Drip] : postnasal drip [Insomnia] : insomnia [Anxiety] : anxiety [Negative] : Heme/Lymph [Fever] : no fever [Chills] : no chills [Recent Change In Weight] : ~T no recent weight change [Discharge] : no discharge [Shortness Of Breath] : no shortness of breath [Wheezing] : no wheezing [Cough] : no cough

## 2019-02-25 NOTE — HISTORY OF PRESENT ILLNESS
[FreeTextEntry1] : pt presents for follow up \par pt c/o sinus +pnd +ha  x since last visit  [de-identified] : She is only taking the diazepam every other night.  She is eating better but hasn't gained any more weight.  The flulike body aches and abdominal discomfort are better but she still has some pressure in her sinuses.  She has a appointment with cardiology next week.  Her BP at home has been in the 110/120/50-60 range.  She plans to do the stool FIT test and schedule the mammogram.  She takes the mirtazapine a few times a week.

## 2019-02-25 NOTE — PHYSICAL EXAM
[No Acute Distress] : no acute distress [Well Nourished] : well nourished [Well Developed] : well developed [Well-Appearing] : well-appearing [Normal Voice/Communication] : normal voice/communication [Normal Sclera/Conjunctiva] : normal sclera/conjunctiva [Normal Outer Ear/Nose] : the outer ears and nose were normal in appearance [No JVD] : no jugular venous distention [Supple] : supple [No Lymphadenopathy] : no lymphadenopathy [No Respiratory Distress] : no respiratory distress  [Clear to Auscultation] : lungs were clear to auscultation bilaterally [No Accessory Muscle Use] : no accessory muscle use [Normal Rate] : normal rate  [Regular Rhythm] : with a regular rhythm [Normal S1, S2] : normal S1 and S2 [Normal Gait] : normal gait [Speech Grossly Normal] : speech grossly normal [Memory Grossly Normal] : memory grossly normal [Normal Affect] : the affect was normal [Normal Mood] : the mood was normal [Normal Insight/Judgement] : insight and judgment were intact [de-identified] : TM's dull, decreased light reflexes, erythema nd edema in nasal passages bilaterally

## 2019-02-25 NOTE — PLAN
[FreeTextEntry1] : I renewed the diazepam and checked I-STOP, ref #[id number], she will take the bactrim as directed for chronic sinusitis, she will continue other medications without change and follow up in 6 weeks or sooner prn

## 2019-03-05 ENCOUNTER — RX RENEWAL (OUTPATIENT)
Age: 74
End: 2019-03-05

## 2019-03-12 ENCOUNTER — RX RENEWAL (OUTPATIENT)
Age: 74
End: 2019-03-12

## 2019-03-12 RX ORDER — SULFAMETHOXAZOLE AND TRIMETHOPRIM 800; 160 MG/1; MG/1
800-160 TABLET ORAL TWICE DAILY
Qty: 20 | Refills: 0 | Status: COMPLETED | COMMUNITY
Start: 2019-02-25 | End: 2019-03-22

## 2019-04-02 ENCOUNTER — APPOINTMENT (OUTPATIENT)
Dept: FAMILY MEDICINE | Facility: CLINIC | Age: 74
End: 2019-04-02
Payer: MEDICARE

## 2019-04-02 VITALS
HEART RATE: 71 BPM | HEIGHT: 65 IN | WEIGHT: 107 LBS | DIASTOLIC BLOOD PRESSURE: 72 MMHG | BODY MASS INDEX: 17.83 KG/M2 | RESPIRATION RATE: 14 BRPM | OXYGEN SATURATION: 97 % | SYSTOLIC BLOOD PRESSURE: 120 MMHG

## 2019-04-02 DIAGNOSIS — F32.0 MAJOR DEPRESSIVE DISORDER, SINGLE EPISODE, MILD: ICD-10-CM

## 2019-04-02 PROCEDURE — 99213 OFFICE O/P EST LOW 20 MIN: CPT

## 2019-04-02 RX ORDER — SULFAMETHOXAZOLE AND TRIMETHOPRIM 800; 160 MG/1; MG/1
800-160 TABLET ORAL TWICE DAILY
Qty: 28 | Refills: 1 | Status: COMPLETED | COMMUNITY
Start: 2019-04-02 | End: 2019-04-30

## 2019-04-02 NOTE — HISTORY OF PRESENT ILLNESS
[FreeTextEntry1] : patient presents for 6 week follow up\par Pt c/o sinus infection\par + right side of face pain [de-identified] : She hasn't gained any further weight and plans to increase her caloric intake.  She is taking the diazepam nightly and takes the mirtazapine about twice a week.  She still has some lingering sinus congestion and pain in her right ear.  She will see the cardiologist on monday and will find out when she can schedule the mammogram since she had the pacemaker placed.

## 2019-04-02 NOTE — REVIEW OF SYSTEMS
[Earache] : earache [Nasal Discharge] : nasal discharge [Sore Throat] : sore throat [Postnasal Drip] : postnasal drip [Headache] : headache [Insomnia] : insomnia [Anxiety] : anxiety [Negative] : Heme/Lymph [Recent Change In Weight] : ~T no recent weight change

## 2019-04-02 NOTE — PHYSICAL EXAM
[No Acute Distress] : no acute distress [Well Nourished] : well nourished [Well Developed] : well developed [Well-Appearing] : well-appearing [Normal Voice/Communication] : normal voice/communication [Normal Sclera/Conjunctiva] : normal sclera/conjunctiva [Supple] : supple [No Lymphadenopathy] : no lymphadenopathy [No Respiratory Distress] : no respiratory distress  [Clear to Auscultation] : lungs were clear to auscultation bilaterally [No Accessory Muscle Use] : no accessory muscle use [Normal Rate] : normal rate  [Regular Rhythm] : with a regular rhythm [Normal S1, S2] : normal S1 and S2 [Speech Grossly Normal] : speech grossly normal [Memory Grossly Normal] : memory grossly normal [Normal Affect] : the affect was normal [Normal Mood] : the mood was normal [Normal Insight/Judgement] : insight and judgment were intact [de-identified] : TM's dull bilaterally, edema and erythema in nasal passages

## 2019-04-02 NOTE — PLAN
[FreeTextEntry1] : she will take bactrim as directed, I renewed diazepam and checked I-STOP, ref #[id number], she will follow up in 5 weeks or sooner prn

## 2019-04-17 ENCOUNTER — MEDICATION RENEWAL (OUTPATIENT)
Age: 74
End: 2019-04-17

## 2019-05-01 ENCOUNTER — RX RENEWAL (OUTPATIENT)
Age: 74
End: 2019-05-01

## 2019-05-08 ENCOUNTER — APPOINTMENT (OUTPATIENT)
Dept: FAMILY MEDICINE | Facility: CLINIC | Age: 74
End: 2019-05-08
Payer: MEDICARE

## 2019-05-08 VITALS
HEART RATE: 87 BPM | BODY MASS INDEX: 18.16 KG/M2 | OXYGEN SATURATION: 96 % | HEIGHT: 65 IN | RESPIRATION RATE: 14 BRPM | SYSTOLIC BLOOD PRESSURE: 112 MMHG | DIASTOLIC BLOOD PRESSURE: 72 MMHG | WEIGHT: 109 LBS

## 2019-05-08 PROCEDURE — 99213 OFFICE O/P EST LOW 20 MIN: CPT

## 2019-05-08 NOTE — PHYSICAL EXAM
[Well Nourished] : well nourished [No Acute Distress] : no acute distress [Well-Appearing] : well-appearing [Well Developed] : well developed [Normal Voice/Communication] : normal voice/communication [Normal Sclera/Conjunctiva] : normal sclera/conjunctiva [Normal Oropharynx] : the oropharynx was normal [No JVD] : no jugular venous distention [Supple] : supple [No Lymphadenopathy] : no lymphadenopathy [Speech Grossly Normal] : speech grossly normal [Memory Grossly Normal] : memory grossly normal [Normal Affect] : the affect was normal [Normal Mood] : the mood was normal [Normal Insight/Judgement] : insight and judgment were intact [de-identified] : TM's dull, decreased light reflexes

## 2019-05-08 NOTE — REVIEW OF SYSTEMS
[Recent Change In Weight] : ~T recent weight change [Nasal Discharge] : nasal discharge [Postnasal Drip] : postnasal drip [Anxiety] : anxiety [Negative] : Heme/Lymph [Fatigue] : no fatigue [Cough] : no cough [Insomnia] : no insomnia [Depression] : no depression [FreeTextEntry2] : weight gain

## 2019-05-08 NOTE — HISTORY OF PRESENT ILLNESS
[FreeTextEntry1] : Pt presents for follow up [de-identified] : She has gained 10 pounds from her lower point.  She is eating much better.  She is having less sinus congestion and the antibiotic is helping.  She has an upcoming appointment with her cardiologist on  and will check on the timing of her mammogram and her stool kit .  She is sleeping well.  She is tolerating the diazepam.

## 2019-05-08 NOTE — PLAN
[FreeTextEntry1] : she was given a new stool fecal occult blood kit, I renewed diazepam and checked i-STOP, ref #[id number].  She will follow up in 1 month

## 2019-06-11 ENCOUNTER — APPOINTMENT (OUTPATIENT)
Dept: FAMILY MEDICINE | Facility: CLINIC | Age: 74
End: 2019-06-11
Payer: MEDICARE

## 2019-06-11 VITALS
HEART RATE: 89 BPM | SYSTOLIC BLOOD PRESSURE: 130 MMHG | WEIGHT: 111 LBS | BODY MASS INDEX: 18.49 KG/M2 | OXYGEN SATURATION: 97 % | TEMPERATURE: 98.4 F | RESPIRATION RATE: 14 BRPM | HEIGHT: 65 IN | DIASTOLIC BLOOD PRESSURE: 80 MMHG

## 2019-06-11 PROCEDURE — 99213 OFFICE O/P EST LOW 20 MIN: CPT

## 2019-06-11 RX ORDER — PROCHLORPERAZINE MALEATE 10 MG/1
10 TABLET ORAL 3 TIMES DAILY
Qty: 30 | Refills: 2 | Status: DISCONTINUED | COMMUNITY
Start: 2019-01-28 | End: 2019-06-11

## 2019-06-11 RX ORDER — SULFAMETHOXAZOLE AND TRIMETHOPRIM 800; 160 MG/1; MG/1
800-160 TABLET ORAL TWICE DAILY
Qty: 28 | Refills: 1 | Status: COMPLETED | COMMUNITY
Start: 2019-05-01 | End: 2019-07-09

## 2019-06-11 NOTE — HISTORY OF PRESENT ILLNESS
[de-identified] : Her sinus infection finally resolved and she has been off the bactrim for 2 weeks.  She now has body aches and clogged ears.  She gained 2 more pounds.  She is eating about 2000 calories per day and has a good appetite.  She is sleeping better.  [FreeTextEntry1] : patient presents for 1 month follow up\par pt c/o body aches, HA, fatigue\par started sunday\par

## 2019-06-11 NOTE — REVIEW OF SYSTEMS
[Chills] : chills [Fatigue] : fatigue [Earache] : earache [Nasal Discharge] : nasal discharge [Sore Throat] : sore throat [Postnasal Drip] : postnasal drip [Headache] : headache [Anxiety] : anxiety [Insomnia] : insomnia [Negative] : Heme/Lymph [Fever] : no fever [Wheezing] : no wheezing [Cough] : no cough [Shortness Of Breath] : no shortness of breath [Suicidal] : not suicidal [Depression] : no depression

## 2019-06-11 NOTE — PHYSICAL EXAM
[No Acute Distress] : no acute distress [Well Nourished] : well nourished [Well Developed] : well developed [Well-Appearing] : well-appearing [Normal Voice/Communication] : normal voice/communication [Normal Sclera/Conjunctiva] : normal sclera/conjunctiva [Supple] : supple [No Respiratory Distress] : no respiratory distress  [Clear to Auscultation] : lungs were clear to auscultation bilaterally [No Lymphadenopathy] : no lymphadenopathy [No Accessory Muscle Use] : no accessory muscle use [Normal Rate] : normal rate  [Regular Rhythm] : with a regular rhythm [Speech Grossly Normal] : speech grossly normal [Normal S1, S2] : normal S1 and S2 [Memory Grossly Normal] : memory grossly normal [Normal Insight/Judgement] : insight and judgment were intact [Normal Mood] : the mood was normal [de-identified] : TM's dull bilaterally, erythema and edema in nasal passages, mild erythema of posterior pharynx

## 2019-06-11 NOTE — PLAN
[FreeTextEntry1] : she will take bactrim as directed, I renewed diazepam and checked I-STOP, ref #[id number], she will schedule the mammogram and was reminded to do the stool test

## 2019-07-11 ENCOUNTER — APPOINTMENT (OUTPATIENT)
Dept: FAMILY MEDICINE | Facility: CLINIC | Age: 74
End: 2019-07-11
Payer: MEDICARE

## 2019-07-11 VITALS
HEIGHT: 65 IN | BODY MASS INDEX: 18.66 KG/M2 | HEART RATE: 75 BPM | OXYGEN SATURATION: 96 % | DIASTOLIC BLOOD PRESSURE: 72 MMHG | SYSTOLIC BLOOD PRESSURE: 126 MMHG | WEIGHT: 112 LBS | RESPIRATION RATE: 14 BRPM

## 2019-07-11 PROCEDURE — 99213 OFFICE O/P EST LOW 20 MIN: CPT

## 2019-07-11 NOTE — REVIEW OF SYSTEMS
[Recent Change In Weight] : ~T recent weight change [Earache] : earache [Nasal Discharge] : nasal discharge [Sore Throat] : sore throat [Postnasal Drip] : postnasal drip [Negative] : Heme/Lymph [Insomnia] : no insomnia [Anxiety] : no anxiety [Depression] : no depression [FreeTextEntry2] : gain of 1 pound

## 2019-07-11 NOTE — HISTORY OF PRESENT ILLNESS
[FreeTextEntry1] : pt presents for 1 month follow up  [de-identified] : She is feeling better but still has some congestion in her throat and ears.  She is finishing the bactrim.  She is sleeping well at night and is also taking short naps during the day.

## 2019-07-11 NOTE — PLAN
[FreeTextEntry1] : she will continue bactrim and I renewed diazepam and checked I-STOP, ref #[id number], she will follow up in 1 month, she will schedule the mammogram after the summer and was reminded to do the stool test

## 2019-07-11 NOTE — PHYSICAL EXAM
[Well Nourished] : well nourished [No Acute Distress] : no acute distress [Well Developed] : well developed [Well-Appearing] : well-appearing [Normal Voice/Communication] : normal voice/communication [Normal Sclera/Conjunctiva] : normal sclera/conjunctiva [Normal Outer Ear/Nose] : the outer ears and nose were normal in appearance [No Lymphadenopathy] : no lymphadenopathy [Supple] : supple [No Respiratory Distress] : no respiratory distress  [No Accessory Muscle Use] : no accessory muscle use [Clear to Auscultation] : lungs were clear to auscultation bilaterally [Normal Rate] : normal rate  [Regular Rhythm] : with a regular rhythm [Normal S1, S2] : normal S1 and S2 [Speech Grossly Normal] : speech grossly normal [Normal Mood] : the mood was normal [Memory Grossly Normal] : memory grossly normal [Normal Insight/Judgement] : insight and judgment were intact [de-identified] : TM's dull bilaterally, mild edema and erythema in nasal passages, mild erythema of posterior pharynx

## 2019-08-08 ENCOUNTER — RX RENEWAL (OUTPATIENT)
Age: 74
End: 2019-08-08

## 2019-08-08 RX ORDER — SULFAMETHOXAZOLE AND TRIMETHOPRIM 800; 160 MG/1; MG/1
800-160 TABLET ORAL TWICE DAILY
Qty: 28 | Refills: 1 | Status: COMPLETED | COMMUNITY
Start: 2019-07-11 | End: 2019-09-05

## 2019-08-15 ENCOUNTER — APPOINTMENT (OUTPATIENT)
Dept: FAMILY MEDICINE | Facility: CLINIC | Age: 74
End: 2019-08-15
Payer: MEDICARE

## 2019-08-15 VITALS
HEART RATE: 84 BPM | OXYGEN SATURATION: 97 % | DIASTOLIC BLOOD PRESSURE: 70 MMHG | RESPIRATION RATE: 14 BRPM | SYSTOLIC BLOOD PRESSURE: 120 MMHG

## 2019-08-15 PROCEDURE — 99213 OFFICE O/P EST LOW 20 MIN: CPT

## 2019-08-15 NOTE — HISTORY OF PRESENT ILLNESS
[FreeTextEntry1] : pt presents for follow up  [de-identified] : She is having improvement in the sinus pain and pressure.  Last week she had a hoarse voice and postnasal drip.  She lost 2 pounds as she wasn't eating well with the hot weather but has gained it back.  She is sleeping well.  When she ran out of the diazepam she took the mirtazapine.

## 2019-08-15 NOTE — REVIEW OF SYSTEMS
[Nasal Discharge] : nasal discharge [Postnasal Drip] : postnasal drip [Cough] : cough [Insomnia] : insomnia [Anxiety] : anxiety [Negative] : Heme/Lymph [Fatigue] : no fatigue [Recent Change In Weight] : ~T no recent weight change [Earache] : no earache [Shortness Of Breath] : no shortness of breath [Wheezing] : no wheezing

## 2019-08-15 NOTE — PHYSICAL EXAM
[No Acute Distress] : no acute distress [Well Nourished] : well nourished [Well Developed] : well developed [Well-Appearing] : well-appearing [Normal Voice/Communication] : normal voice/communication [Normal Sclera/Conjunctiva] : normal sclera/conjunctiva [Normal Outer Ear/Nose] : the outer ears and nose were normal in appearance [Normal Oropharynx] : the oropharynx was normal [No Lymphadenopathy] : no lymphadenopathy [Supple] : supple [No Respiratory Distress] : no respiratory distress  [No Accessory Muscle Use] : no accessory muscle use [Clear to Auscultation] : lungs were clear to auscultation bilaterally [Normal Rate] : normal rate  [Regular Rhythm] : with a regular rhythm [Normal S1, S2] : normal S1 and S2 [Speech Grossly Normal] : speech grossly normal [Memory Grossly Normal] : memory grossly normal [Normal Insight/Judgement] : insight and judgment were intact [Normal Mood] : the mood was normal

## 2019-08-15 NOTE — PLAN
[FreeTextEntry1] : I renewed diazepam and checked I-STOP, ref #[id number], she will finish the course of bactrim and follow up in 1 month or sooner prn

## 2019-09-12 ENCOUNTER — APPOINTMENT (OUTPATIENT)
Dept: FAMILY MEDICINE | Facility: CLINIC | Age: 74
End: 2019-09-12
Payer: MEDICARE

## 2019-09-12 VITALS
HEART RATE: 111 BPM | BODY MASS INDEX: 18.33 KG/M2 | HEIGHT: 65 IN | SYSTOLIC BLOOD PRESSURE: 126 MMHG | WEIGHT: 110 LBS | DIASTOLIC BLOOD PRESSURE: 80 MMHG | OXYGEN SATURATION: 97 % | RESPIRATION RATE: 14 BRPM

## 2019-09-12 PROCEDURE — 36415 COLL VENOUS BLD VENIPUNCTURE: CPT

## 2019-09-12 PROCEDURE — 99213 OFFICE O/P EST LOW 20 MIN: CPT | Mod: 25

## 2019-09-12 NOTE — PLAN
[FreeTextEntry1] : TSH will be drawn in the office today, I renewed diazepam and checked I-STOP, ref #[id number], she will follow up in 1 month or sooner prn

## 2019-09-12 NOTE — HISTORY OF PRESENT ILLNESS
[FreeTextEntry1] : patient presents for 1 month follow up  [de-identified] : She finished the bactrim and took 5 days worth of cephalexin and then finished that a few days ago and feels OK.  Her weight is overall stable.  She has been feeling a little fatigued in the morning and may want to start a different antidepressant if it persists.  She strained her left upper arm and it still hurts a little with certain movements.  Sometimes she has pain in her right foot and ankle during the night and she has "spider" veins in the foot and is concerned.  She plans to do the stool test and schedule the mammogram. The diazepam is working well for her anxiety and sleep difficulties and she denies any side effects.

## 2019-09-12 NOTE — REVIEW OF SYSTEMS
[Depression] : depression [Insomnia] : insomnia [Negative] : Heme/Lymph [Earache] : no earache [Recent Change In Weight] : ~T no recent weight change [Postnasal Drip] : no postnasal drip [Nasal Discharge] : no nasal discharge [Suicidal] : not suicidal

## 2019-09-12 NOTE — PHYSICAL EXAM
[No Acute Distress] : no acute distress [Well Nourished] : well nourished [Well-Appearing] : well-appearing [Well Developed] : well developed [Normal Voice/Communication] : normal voice/communication [Normal Outer Ear/Nose] : the outer ears and nose were normal in appearance [Normal Oropharynx] : the oropharynx was normal [Normal TMs] : both tympanic membranes were normal [Supple] : supple [No Lymphadenopathy] : no lymphadenopathy [No Respiratory Distress] : no respiratory distress  [No Accessory Muscle Use] : no accessory muscle use [Clear to Auscultation] : lungs were clear to auscultation bilaterally [Normal Rate] : normal rate  [Regular Rhythm] : with a regular rhythm [Normal S1, S2] : normal S1 and S2 [Speech Grossly Normal] : speech grossly normal [Memory Grossly Normal] : memory grossly normal [Normal Insight/Judgement] : insight and judgment were intact [Normal Mood] : the mood was normal

## 2019-09-13 ENCOUNTER — RX RENEWAL (OUTPATIENT)
Age: 74
End: 2019-09-13

## 2019-09-13 LAB — TSH SERPL-ACNC: 4.56 UIU/ML

## 2019-09-25 ENCOUNTER — RX RENEWAL (OUTPATIENT)
Age: 74
End: 2019-09-25

## 2019-10-10 ENCOUNTER — APPOINTMENT (OUTPATIENT)
Dept: FAMILY MEDICINE | Facility: CLINIC | Age: 74
End: 2019-10-10
Payer: MEDICARE

## 2019-10-10 VITALS
OXYGEN SATURATION: 97 % | BODY MASS INDEX: 18.33 KG/M2 | HEIGHT: 65 IN | WEIGHT: 110 LBS | RESPIRATION RATE: 14 BRPM | HEART RATE: 97 BPM | DIASTOLIC BLOOD PRESSURE: 80 MMHG | SYSTOLIC BLOOD PRESSURE: 122 MMHG

## 2019-10-10 VITALS — DIASTOLIC BLOOD PRESSURE: 66 MMHG | SYSTOLIC BLOOD PRESSURE: 126 MMHG

## 2019-10-10 DIAGNOSIS — Z86.69 PERSONAL HISTORY OF OTHER DISEASES OF THE NERVOUS SYSTEM AND SENSE ORGANS: ICD-10-CM

## 2019-10-10 DIAGNOSIS — Z87.898 PERSONAL HISTORY OF OTHER SPECIFIED CONDITIONS: ICD-10-CM

## 2019-10-10 PROCEDURE — 99213 OFFICE O/P EST LOW 20 MIN: CPT

## 2019-10-10 NOTE — PHYSICAL EXAM
[No Acute Distress] : no acute distress [Well Nourished] : well nourished [Well Developed] : well developed [Well-Appearing] : well-appearing [Normal Voice/Communication] : normal voice/communication [Coordination Grossly Intact] : coordination grossly intact [No Focal Deficits] : no focal deficits [Speech Grossly Normal] : speech grossly normal [Memory Grossly Normal] : memory grossly normal [Normal Mood] : the mood was normal [Normal Insight/Judgement] : insight and judgment were intact

## 2019-10-10 NOTE — PLAN
[FreeTextEntry1] : I renewed medications and checked I-STOP, ref #[id number], she may follow up in 2 months and call next month for her rx's

## 2019-10-10 NOTE — REVIEW OF SYSTEMS
[Insomnia] : insomnia [Anxiety] : anxiety [Negative] : Heme/Lymph [Nasal Discharge] : no nasal discharge [Discharge] : no discharge [Recent Change In Weight] : ~T no recent weight change [Suicidal] : not suicidal [Postnasal Drip] : no postnasal drip [Depression] : no depression

## 2019-10-10 NOTE — HISTORY OF PRESENT ILLNESS
[FreeTextEntry1] : pt presents for 1 month follow up  [de-identified] : She feels better with the nausea and the sinus congestion.  She plans to schedule the mammogram and to do the stool test.  Her weight is overall stable.  She is checking her BP at home and occasionally the top number is less than 100.  She feels that her anxiety is doing well and is asking if she can come to the office every other month.

## 2019-10-22 ENCOUNTER — RX RENEWAL (OUTPATIENT)
Age: 74
End: 2019-10-22

## 2019-10-22 RX ORDER — SULFAMETHOXAZOLE AND TRIMETHOPRIM 800; 160 MG/1; MG/1
800-160 TABLET ORAL TWICE DAILY
Qty: 28 | Refills: 1 | Status: COMPLETED | COMMUNITY
Start: 2019-09-25 | End: 2019-11-19

## 2019-11-26 ENCOUNTER — RX RENEWAL (OUTPATIENT)
Age: 74
End: 2019-11-26

## 2019-12-06 ENCOUNTER — RX RENEWAL (OUTPATIENT)
Age: 74
End: 2019-12-06

## 2019-12-08 ENCOUNTER — MOBILE ON CALL (OUTPATIENT)
Age: 74
End: 2019-12-08

## 2019-12-10 ENCOUNTER — APPOINTMENT (OUTPATIENT)
Dept: FAMILY MEDICINE | Facility: CLINIC | Age: 74
End: 2019-12-10

## 2019-12-10 ENCOUNTER — RX RENEWAL (OUTPATIENT)
Age: 74
End: 2019-12-10

## 2019-12-10 RX ORDER — SULFAMETHOXAZOLE AND TRIMETHOPRIM 800; 160 MG/1; MG/1
800-160 TABLET ORAL TWICE DAILY
Qty: 28 | Refills: 1 | Status: COMPLETED | COMMUNITY
Start: 2019-12-10 | End: 2020-01-07

## 2019-12-27 ENCOUNTER — RX RENEWAL (OUTPATIENT)
Age: 74
End: 2019-12-27

## 2019-12-30 ENCOUNTER — MEDICATION RENEWAL (OUTPATIENT)
Age: 74
End: 2019-12-30

## 2019-12-31 ENCOUNTER — RX RENEWAL (OUTPATIENT)
Age: 74
End: 2019-12-31

## 2020-01-10 ENCOUNTER — APPOINTMENT (OUTPATIENT)
Dept: FAMILY MEDICINE | Facility: CLINIC | Age: 75
End: 2020-01-10
Payer: MEDICARE

## 2020-01-10 VITALS
OXYGEN SATURATION: 98 % | HEIGHT: 65 IN | WEIGHT: 110 LBS | SYSTOLIC BLOOD PRESSURE: 122 MMHG | BODY MASS INDEX: 18.33 KG/M2 | DIASTOLIC BLOOD PRESSURE: 76 MMHG | HEART RATE: 94 BPM | RESPIRATION RATE: 14 BRPM

## 2020-01-10 VITALS — TEMPERATURE: 98.3 F

## 2020-01-10 DIAGNOSIS — Z12.39 ENCOUNTER FOR OTHER SCREENING FOR MALIGNANT NEOPLASM OF BREAST: ICD-10-CM

## 2020-01-10 PROCEDURE — 99213 OFFICE O/P EST LOW 20 MIN: CPT

## 2020-01-10 RX ORDER — SULFAMETHOXAZOLE AND TRIMETHOPRIM 800; 160 MG/1; MG/1
800-160 TABLET ORAL TWICE DAILY
Qty: 28 | Refills: 1 | Status: COMPLETED | COMMUNITY
Start: 2020-01-10 | End: 2020-02-07

## 2020-01-10 RX ORDER — CEFDINIR 300 MG/1
300 CAPSULE ORAL
Qty: 28 | Refills: 1 | Status: DISCONTINUED | COMMUNITY
Start: 2019-03-05 | End: 2020-01-10

## 2020-01-10 NOTE — PHYSICAL EXAM
[No Acute Distress] : no acute distress [Well Nourished] : well nourished [Normal Voice/Communication] : normal voice/communication [Well Developed] : well developed [Well-Appearing] : well-appearing [Normal Outer Ear/Nose] : the outer ears and nose were normal in appearance [Normal Sclera/Conjunctiva] : normal sclera/conjunctiva [No Lymphadenopathy] : no lymphadenopathy [No Respiratory Distress] : no respiratory distress  [Supple] : supple [No Accessory Muscle Use] : no accessory muscle use [Clear to Auscultation] : lungs were clear to auscultation bilaterally [Regular Rhythm] : with a regular rhythm [Normal Rate] : normal rate  [Normal S1, S2] : normal S1 and S2 [Speech Grossly Normal] : speech grossly normal [Coordination Grossly Intact] : coordination grossly intact [Normal Affect] : the affect was normal [Memory Grossly Normal] : memory grossly normal [Normal Insight/Judgement] : insight and judgment were intact [Normal Mood] : the mood was normal [de-identified] : TM's dull bilaterally, edema and erythema in nasal passages, mild erythema of posterior pharynx

## 2020-01-10 NOTE — REVIEW OF SYSTEMS
[Fatigue] : fatigue [Chills] : chills [Earache] : earache [Nasal Discharge] : nasal discharge [Sore Throat] : sore throat [Postnasal Drip] : postnasal drip [Headache] : headache [Muscle Pain] : muscle pain [Negative] : Heme/Lymph [Fever] : no fever [Discharge] : no discharge [Hoarseness] : no hoarseness [Shortness Of Breath] : no shortness of breath [Wheezing] : no wheezing [Cough] : no cough

## 2020-01-10 NOTE — PLAN
[FreeTextEntry1] : she will take bactrim as directed and follow up later this month as scheduled or sooner prn

## 2020-01-28 ENCOUNTER — APPOINTMENT (OUTPATIENT)
Dept: FAMILY MEDICINE | Facility: CLINIC | Age: 75
End: 2020-01-28
Payer: MEDICARE

## 2020-01-28 VITALS
RESPIRATION RATE: 14 BRPM | DIASTOLIC BLOOD PRESSURE: 70 MMHG | HEART RATE: 95 BPM | HEIGHT: 65 IN | WEIGHT: 109 LBS | SYSTOLIC BLOOD PRESSURE: 120 MMHG | BODY MASS INDEX: 18.16 KG/M2 | OXYGEN SATURATION: 98 %

## 2020-01-28 DIAGNOSIS — Z12.11 ENCOUNTER FOR SCREENING FOR MALIGNANT NEOPLASM OF COLON: ICD-10-CM

## 2020-01-28 PROCEDURE — 99214 OFFICE O/P EST MOD 30 MIN: CPT

## 2020-01-28 RX ORDER — MIRTAZAPINE 7.5 MG/1
7.5 TABLET, FILM COATED ORAL
Qty: 30 | Refills: 3 | Status: DISCONTINUED | COMMUNITY
Start: 2018-12-13 | End: 2020-01-28

## 2020-01-28 NOTE — REVIEW OF SYSTEMS
[Chills] : chills [Fatigue] : fatigue [Earache] : earache [Sore Throat] : sore throat [Nasal Discharge] : nasal discharge [Postnasal Drip] : postnasal drip [Shortness Of Breath] : shortness of breath [Cough] : cough [Wheezing] : wheezing [Dysuria] : dysuria [Frequency] : frequency [Muscle Pain] : muscle pain [Headache] : headache [Negative] : Heme/Lymph [Fever] : no fever [Discharge] : no discharge

## 2020-01-28 NOTE — PLAN
[FreeTextEntry1] : she will continue both antibiotics, she doesn't need the inhalers at this time, she will do the stool test, she will increase caloric intake in order to gain weight, I renewed diazepam and checked I-STOP, she will follow up in 1 month or sooner prn

## 2020-01-28 NOTE — HISTORY OF PRESENT ILLNESS
[FreeTextEntry1] : pt presents for flu follow up [de-identified] : The body aches and sore throat had resolved.  She still has some sinus pressure but it is better.  She has been using symbicort and her rescue inhaler for the cough and it is helping.  She is sleeping well with the diazepam.  She did lose 4 pounds as she wasn't eating well when she was ill.  Since friday she has pain in the pelvic area by the bladder and aches in the legs but it isn't as severe as it was in the past.  She is finishing up the bactrim and added the cefdinir for the bladder pain.  She hasn't been feeling depressed.  She was using symbicort and proair but stopped a few days ago.

## 2020-01-28 NOTE — PHYSICAL EXAM
[No Acute Distress] : no acute distress [Well Nourished] : well nourished [Well-Appearing] : well-appearing [Well Developed] : well developed [Normal Sclera/Conjunctiva] : normal sclera/conjunctiva [Normal Voice/Communication] : normal voice/communication [Normal Outer Ear/Nose] : the outer ears and nose were normal in appearance [Normal Oropharynx] : the oropharynx was normal [No Lymphadenopathy] : no lymphadenopathy [Supple] : supple [Normal TMs] : both tympanic membranes were normal [No Accessory Muscle Use] : no accessory muscle use [No Respiratory Distress] : no respiratory distress  [Normal Rate] : normal rate  [Clear to Auscultation] : lungs were clear to auscultation bilaterally [Normal S1, S2] : normal S1 and S2 [Regular Rhythm] : with a regular rhythm [No Focal Deficits] : no focal deficits [Coordination Grossly Intact] : coordination grossly intact [Normal Gait] : normal gait [Speech Grossly Normal] : speech grossly normal [Memory Grossly Normal] : memory grossly normal [Normal Insight/Judgement] : insight and judgment were intact [Normal Affect] : the affect was normal [Normal Mood] : the mood was normal [de-identified] : TM's dull bilaterally, mild edema in nasal passages

## 2020-02-28 ENCOUNTER — APPOINTMENT (OUTPATIENT)
Dept: FAMILY MEDICINE | Facility: CLINIC | Age: 75
End: 2020-02-28
Payer: MEDICARE

## 2020-02-28 VITALS
DIASTOLIC BLOOD PRESSURE: 70 MMHG | OXYGEN SATURATION: 97 % | HEIGHT: 65 IN | RESPIRATION RATE: 14 BRPM | SYSTOLIC BLOOD PRESSURE: 110 MMHG | WEIGHT: 107 LBS | HEART RATE: 90 BPM | BODY MASS INDEX: 17.83 KG/M2

## 2020-02-28 PROCEDURE — 99213 OFFICE O/P EST LOW 20 MIN: CPT

## 2020-02-28 RX ORDER — SULFAMETHOXAZOLE AND TRIMETHOPRIM 800; 160 MG/1; MG/1
800-160 TABLET ORAL
Qty: 28 | Refills: 1 | Status: COMPLETED | COMMUNITY
Start: 2020-02-10 | End: 2020-03-19

## 2020-02-28 NOTE — HISTORY OF PRESENT ILLNESS
[FreeTextEntry1] : pt presents for medication renewal and sick visit follow up  [de-identified] : She is feeling better with the bactrim.  She still has postnasal drip in the mornings.  She is starting to eat better.  She is sleeping well.  She takes the diazepam about 5 days per week.

## 2020-02-28 NOTE — REVIEW OF SYSTEMS
[Postnasal Drip] : postnasal drip [Nasal Discharge] : nasal discharge [Headache] : headache [Insomnia] : insomnia [Negative] : Heme/Lymph [Recent Change In Weight] : ~T no recent weight change [Shortness Of Breath] : no shortness of breath [Sore Throat] : no sore throat [Cough] : no cough [Wheezing] : no wheezing [Anxiety] : no anxiety [Suicidal] : not suicidal [Depression] : no depression

## 2020-02-28 NOTE — PLAN
[FreeTextEntry1] : she will continue the bactrim, I renewed omeprazole, she was reminded to do the stool test and will follow up for a med check in 6 weeks and call for the diazepam renewal on 3/13

## 2020-02-28 NOTE — PHYSICAL EXAM
[No Acute Distress] : no acute distress [Well Nourished] : well nourished [Well-Appearing] : well-appearing [Well Developed] : well developed [Normal Outer Ear/Nose] : the outer ears and nose were normal in appearance [Normal Voice/Communication] : normal voice/communication [Normal Sclera/Conjunctiva] : normal sclera/conjunctiva [Normal Oropharynx] : the oropharynx was normal [No Lymphadenopathy] : no lymphadenopathy [Supple] : supple [No Respiratory Distress] : no respiratory distress  [No Accessory Muscle Use] : no accessory muscle use [Normal Rate] : normal rate  [Clear to Auscultation] : lungs were clear to auscultation bilaterally [Coordination Grossly Intact] : coordination grossly intact [Normal S1, S2] : normal S1 and S2 [Regular Rhythm] : with a regular rhythm [Memory Grossly Normal] : memory grossly normal [Normal Affect] : the affect was normal [Speech Grossly Normal] : speech grossly normal [Normal Insight/Judgement] : insight and judgment were intact [Normal Mood] : the mood was normal [de-identified] : TM's dull bilaterally, edema and erythema in nasal passages

## 2020-03-26 ENCOUNTER — APPOINTMENT (OUTPATIENT)
Dept: FAMILY MEDICINE | Facility: CLINIC | Age: 75
End: 2020-03-26
Payer: MEDICARE

## 2020-03-26 PROCEDURE — G2012 BRIEF CHECK IN BY MD/QHP: CPT

## 2020-03-26 RX ORDER — SULFAMETHOXAZOLE AND TRIMETHOPRIM 800; 160 MG/1; MG/1
800-160 TABLET ORAL TWICE DAILY
Qty: 28 | Refills: 1 | Status: COMPLETED | COMMUNITY
Start: 2020-03-26 | End: 2020-04-23

## 2020-03-28 ENCOUNTER — NON-APPOINTMENT (OUTPATIENT)
Age: 75
End: 2020-03-28

## 2020-04-08 ENCOUNTER — APPOINTMENT (OUTPATIENT)
Dept: FAMILY MEDICINE | Facility: CLINIC | Age: 75
End: 2020-04-08
Payer: MEDICARE

## 2020-04-08 PROCEDURE — G2012 BRIEF CHECK IN BY MD/QHP: CPT

## 2020-04-28 ENCOUNTER — APPOINTMENT (OUTPATIENT)
Dept: FAMILY MEDICINE | Facility: CLINIC | Age: 75
End: 2020-04-28
Payer: MEDICARE

## 2020-04-28 PROCEDURE — 99441: CPT

## 2020-04-28 RX ORDER — SULFAMETHOXAZOLE AND TRIMETHOPRIM 800; 160 MG/1; MG/1
800-160 TABLET ORAL
Qty: 28 | Refills: 1 | Status: COMPLETED | COMMUNITY
Start: 2020-04-28 | End: 2020-05-18

## 2020-05-12 ENCOUNTER — APPOINTMENT (OUTPATIENT)
Dept: FAMILY MEDICINE | Facility: CLINIC | Age: 75
End: 2020-05-12
Payer: MEDICARE

## 2020-05-12 PROCEDURE — 99442: CPT

## 2020-05-12 NOTE — HISTORY OF PRESENT ILLNESS
[Verbal consent obtained from patient] : the patient, [unfilled] [de-identified] : The patient telephoned and requested a call back to discuss their health.  The visit was performed over the telephone as the patient was unable to be seen in the office due to the COVID 19 pandemic.  The sinus infection and bladder symptoms are slowly improving.  Her weight is stable.  Her anxiety is improved and she is sleeping well with the diazepam.  Her blood pressure is now in the 110 range systolic.\par  [FreeTextEntry1] : Pt presents for a follow up of the sinus and bladder infections and the insomnia.

## 2020-05-12 NOTE — REVIEW OF SYSTEMS
[Fatigue] : fatigue [Earache] : earache [Nasal Discharge] : nasal discharge [Postnasal Drip] : postnasal drip [Insomnia] : insomnia [Anxiety] : anxiety [Negative] : Heme/Lymph [Fever] : no fever [Chills] : no chills [Recent Change In Weight] : ~T no recent weight change [Discharge] : no discharge [Sore Throat] : no sore throat [Wheezing] : no wheezing [Shortness Of Breath] : no shortness of breath [Dysuria] : no dysuria [Cough] : no cough [Headache] : no headache [Hematuria] : no hematuria [Suicidal] : not suicidal [Depression] : no depression

## 2020-05-12 NOTE — PLAN
[FreeTextEntry1] : she was encouraged to continue medications and nasal sprays, she will continue use of saline nasal spray after being outdoors, she will continue protein supplements in an effort to maintain her weight, after checking I-STOP I renewed diazepam, she will follow up in 1 month or sooner prn

## 2020-05-15 ENCOUNTER — APPOINTMENT (OUTPATIENT)
Dept: FAMILY MEDICINE | Facility: CLINIC | Age: 75
End: 2020-05-15
Payer: MEDICARE

## 2020-05-15 VITALS
DIASTOLIC BLOOD PRESSURE: 81 MMHG | BODY MASS INDEX: 15.83 KG/M2 | RESPIRATION RATE: 14 BRPM | WEIGHT: 95 LBS | HEIGHT: 65 IN | SYSTOLIC BLOOD PRESSURE: 142 MMHG | HEART RATE: 101 BPM

## 2020-05-15 DIAGNOSIS — H69.82 OTHER SPECIFIED DISORDERS OF EUSTACHIAN TUBE, LEFT EAR: ICD-10-CM

## 2020-05-15 DIAGNOSIS — H69.81 OTHER SPECIFIED DISORDERS OF EUSTACHIAN TUBE, RIGHT EAR: ICD-10-CM

## 2020-05-15 PROCEDURE — 99213 OFFICE O/P EST LOW 20 MIN: CPT

## 2020-05-15 NOTE — PLAN
[FreeTextEntry1] : she was advised to continue the antihistamine and nasal sprays, she may continue the debrox if it gives her relief, she was advised to use saline nasal spray after being outdoors and will follow up if symptoms persist or worsen

## 2020-05-15 NOTE — HISTORY OF PRESENT ILLNESS
[FreeTextEntry8] : Pt c/o clogged and pressure in ears X 3 weeks -pain -itching.  She feels fluid in both ears and the right one is getting worse.  She has been using debrox drops.  Her BP at home was 107 systolic this morning.

## 2020-05-15 NOTE — PHYSICAL EXAM
[No Acute Distress] : no acute distress [Well Nourished] : well nourished [Well Developed] : well developed [Normal Sclera/Conjunctiva] : normal sclera/conjunctiva [Well-Appearing] : well-appearing [Normal Voice/Communication] : normal voice/communication [Coordination Grossly Intact] : coordination grossly intact [Normal Oropharynx] : the oropharynx was normal [Normal Outer Ear/Nose] : the outer ears and nose were normal in appearance [Speech Grossly Normal] : speech grossly normal [No Focal Deficits] : no focal deficits [Normal Gait] : normal gait [Normal Insight/Judgement] : insight and judgment were intact [Memory Grossly Normal] : memory grossly normal [de-identified] : TM's dull, decreased light reflexes [de-identified] : she declined having her lungs auscultated [de-identified] : she declined having her heart auscultated [de-identified] : she is very anxious

## 2020-05-15 NOTE — REVIEW OF SYSTEMS
[Earache] : earache [Postnasal Drip] : postnasal drip [Sore Throat] : sore throat [Nasal Discharge] : nasal discharge [Negative] : Heme/Lymph [Shortness Of Breath] : no shortness of breath [Cough] : no cough [Wheezing] : no wheezing

## 2020-06-03 ENCOUNTER — APPOINTMENT (OUTPATIENT)
Dept: FAMILY MEDICINE | Facility: CLINIC | Age: 75
End: 2020-06-03
Payer: MEDICARE

## 2020-06-03 DIAGNOSIS — Z86.69 PERSONAL HISTORY OF OTHER DISEASES OF THE NERVOUS SYSTEM AND SENSE ORGANS: ICD-10-CM

## 2020-06-03 PROCEDURE — 99441: CPT

## 2020-06-03 RX ORDER — SULFAMETHOXAZOLE AND TRIMETHOPRIM 800; 160 MG/1; MG/1
800-160 TABLET ORAL
Qty: 28 | Refills: 1 | Status: COMPLETED | COMMUNITY
Start: 2020-05-26 | End: 2020-06-23

## 2020-06-03 NOTE — PLAN
[FreeTextEntry1] : I will renew both medications today but I expressed my concerns about the prolonged use of antibiotics, she is very anxious that she will become ill especially with the pandemic and will try for an antibiotic free period when the sprin allergy season resolves

## 2020-06-03 NOTE — HISTORY OF PRESENT ILLNESS
[Home] : at home, [unfilled] , at the time of the visit. [Medical Office: ([facility])___] : at the medical office located in  [Verbal consent obtained from patient] : the patient, [unfilled] [FreeTextEntry1] : Pt presents for a follow up on the sinus pressure and bladder pain. [de-identified] : The patient telephoned and requested a call back to discuss their health.  The visit was performed over the telephone as the patient was unable to be seen in the office due to the COVID 19 pandemic.  She would like to continue on the cefdinir as she still has the discomfort in her bladder area.  It is slowly improving but she is afraid that she will get a serious UTI when she stops the medication.  She still has the pressure in her ears and the sinus congestion and would like to stay on the bactrim also as it is slowly improving her symptoms.\par

## 2020-06-03 NOTE — REVIEW OF SYSTEMS
[Earache] : earache [Nasal Discharge] : nasal discharge [Postnasal Drip] : postnasal drip [Anxiety] : anxiety [Negative] : Heme/Lymph [Recent Change In Weight] : ~T no recent weight change [Cough] : no cough [Suicidal] : not suicidal [Depression] : no depression [FreeTextEntry8] : discomfort in bladder area

## 2020-06-12 ENCOUNTER — APPOINTMENT (OUTPATIENT)
Dept: FAMILY MEDICINE | Facility: CLINIC | Age: 75
End: 2020-06-12
Payer: MEDICARE

## 2020-06-12 PROCEDURE — 99441: CPT

## 2020-06-12 NOTE — PLAN
[FreeTextEntry1] : GI consultation was advised and she was advised to call us if unable to obtain an appointment in the next week

## 2020-06-12 NOTE — HISTORY OF PRESENT ILLNESS
[Home] : at home, [unfilled] , at the time of the visit. [Medical Office: ([facility])___] : at the medical office located in  [Verbal consent obtained from patient] : the patient, [unfilled] [FreeTextEntry8] : The patient telephoned and requested a call back to discuss their health.  The visit was performed over the telephone as the patient was unable to be seen in the office due to the COVID 19 pandemic.  She has had abdominal pain which has been sharp and has been constipated and has a poor appetite and is still losing weight.  She did an enema on Sunday and did have some BM's after\par

## 2020-06-12 NOTE — REVIEW OF SYSTEMS
[Fatigue] : fatigue [Constipation] : constipation [Recent Change In Weight] : ~T recent weight change [Abdominal Pain] : abdominal pain [Anxiety] : anxiety [Negative] : Heme/Lymph

## 2020-06-16 ENCOUNTER — APPOINTMENT (OUTPATIENT)
Dept: FAMILY MEDICINE | Facility: CLINIC | Age: 75
End: 2020-06-16
Payer: MEDICARE

## 2020-06-16 PROCEDURE — 99441: CPT

## 2020-06-16 RX ORDER — OMEPRAZOLE 20 MG/1
20 CAPSULE, DELAYED RELEASE ORAL
Qty: 90 | Refills: 3 | Status: DISCONTINUED | COMMUNITY
Start: 2017-03-24 | End: 2020-06-16

## 2020-06-16 RX ORDER — OMEPRAZOLE 20 MG/1
20 CAPSULE, DELAYED RELEASE ORAL
Qty: 90 | Refills: 0 | Status: DISCONTINUED | COMMUNITY
Start: 2019-12-08 | End: 2020-06-16

## 2020-06-16 NOTE — HISTORY OF PRESENT ILLNESS
[Home] : at home, [unfilled] , at the time of the visit. [Medical Office: ([facility])___] : at the medical office located in  [Verbal consent obtained from patient] : the patient, [unfilled] [FreeTextEntry1] : Pt presents for a follow up on medication. [de-identified] : The patient telephoned and requested a call back to discuss their health.  The visit was performed over the telephone as the patient was unable to be seen in the office due to the COVID 19 pandemic.  She saw Dr Conklin and is being scheduled for an upper endoscopy and colonoscopy and he increased the omeprazole to 40 mg daily and she will have the nasal swab for Covid.  She would like to start an antidepressant.  The ear congestion has improved but she still has sinus congestion.  The bladder pain has improved.\par

## 2020-06-16 NOTE — REVIEW OF SYSTEMS
[Recent Change In Weight] : ~T recent weight change [Fatigue] : fatigue [Nasal Discharge] : nasal discharge [Abdominal Pain] : abdominal pain [Postnasal Drip] : postnasal drip [Negative] : Psychiatric [FreeTextEntry2] : weight loss [FreeTextEntry4] : clogged ears

## 2020-06-30 ENCOUNTER — APPOINTMENT (OUTPATIENT)
Dept: FAMILY MEDICINE | Facility: CLINIC | Age: 75
End: 2020-06-30
Payer: MEDICARE

## 2020-06-30 PROCEDURE — 99441: CPT

## 2020-06-30 RX ORDER — FLUOXETINE HYDROCHLORIDE 10 MG/1
10 TABLET ORAL
Qty: 30 | Refills: 3 | Status: DISCONTINUED | COMMUNITY
Start: 2020-06-30 | End: 2020-06-30

## 2020-06-30 RX ORDER — PAROXETINE HYDROCHLORIDE 10 MG/1
10 TABLET, FILM COATED ORAL DAILY
Qty: 30 | Refills: 2 | Status: DISCONTINUED | COMMUNITY
Start: 2020-06-16 | End: 2020-06-30

## 2020-06-30 NOTE — HISTORY OF PRESENT ILLNESS
[Home] : at home, [unfilled] , at the time of the visit. [Verbal consent obtained from patient] : the patient, [unfilled] [Medical Office: ([facility])___] : at the medical office located in  [FreeTextEntry1] : Pt presents for a follow up on her urinary symptoms.   [de-identified] : For the past few days she has had more pain in her pelvic area and burning with urination and wants to renew the cefdinir.  She is down to 86 pounds.  She took 1 dose of paroxetine but stopped it due to upset stomach.  She has been very anxious.  She is having very small BM's daily and is scheduled for the endoscopy and colonoscopy in July.

## 2020-06-30 NOTE — REVIEW OF SYSTEMS
[Nasal Discharge] : nasal discharge [Fatigue] : fatigue [Recent Change In Weight] : ~T recent weight change [Postnasal Drip] : postnasal drip [Abdominal Pain] : abdominal pain [Dysuria] : dysuria [Constipation] : constipation [Anxiety] : anxiety [Insomnia] : insomnia [Negative] : Heme/Lymph [Suicidal] : not suicidal [FreeTextEntry8] : pelvic pain [FreeTextEntry2] : weight loss

## 2020-06-30 NOTE — PLAN
[FreeTextEntry1] : I renewed the cefdinir and she will follow up next week in the office for urine testing, she will start fluoxetine 10 mg daily and was given emotional support

## 2020-07-07 ENCOUNTER — APPOINTMENT (OUTPATIENT)
Dept: FAMILY MEDICINE | Facility: CLINIC | Age: 75
End: 2020-07-07
Payer: MEDICARE

## 2020-07-07 VITALS
BODY MASS INDEX: 15.83 KG/M2 | OXYGEN SATURATION: 98 % | HEART RATE: 59 BPM | HEIGHT: 65 IN | SYSTOLIC BLOOD PRESSURE: 110 MMHG | WEIGHT: 95 LBS | RESPIRATION RATE: 16 BRPM | DIASTOLIC BLOOD PRESSURE: 60 MMHG

## 2020-07-07 VITALS — HEART RATE: 100 BPM

## 2020-07-07 DIAGNOSIS — R10.9 UNSPECIFIED ABDOMINAL PAIN: ICD-10-CM

## 2020-07-07 LAB
BILIRUB UR QL STRIP: NEGATIVE
CLARITY UR: NORMAL
COLLECTION METHOD: NORMAL
GLUCOSE UR-MCNC: NEGATIVE
HCG UR QL: 0.2 EU/DL
HGB UR QL STRIP.AUTO: NORMAL
KETONES UR-MCNC: NEGATIVE
LEUKOCYTE ESTERASE UR QL STRIP: NEGATIVE
NITRITE UR QL STRIP: NEGATIVE
PH UR STRIP: 5.5
PROT UR STRIP-MCNC: 30
SP GR UR STRIP: 1.03

## 2020-07-07 PROCEDURE — 99214 OFFICE O/P EST MOD 30 MIN: CPT | Mod: 25

## 2020-07-07 PROCEDURE — 81003 URINALYSIS AUTO W/O SCOPE: CPT | Mod: QW

## 2020-07-07 PROCEDURE — 36415 COLL VENOUS BLD VENIPUNCTURE: CPT

## 2020-07-07 RX ORDER — AZELASTINE HYDROCHLORIDE 205.5 UG/1
0.15 SPRAY, METERED NASAL
Refills: 0 | Status: COMPLETED | COMMUNITY
End: 2020-07-07

## 2020-07-07 RX ORDER — SULFAMETHOXAZOLE AND TRIMETHOPRIM 800; 160 MG/1; MG/1
800-160 TABLET ORAL
Qty: 28 | Refills: 1 | Status: COMPLETED | COMMUNITY
Start: 2020-07-07 | End: 2020-07-27

## 2020-07-07 NOTE — PLAN
[FreeTextEntry1] : urine C&S was ordered, labs will be drawn in the office today to further assess her symptoms and she will be notified of the results, no medication changes were made, she will be referred to the hospital for further testing depending on the lab report and she will follow up next week as scheduled, all of her questions were answered and her daughter Sena was in the room for the visit

## 2020-07-07 NOTE — HISTORY OF PRESENT ILLNESS
[FreeTextEntry1] : Patient presents for recurrent UTI follow up.  [de-identified] : She is scheduled for the upper endoscopy and colonoscopy on 7/20.  Her throat feels dry and her right ear is clogged.  Her weight is down to 83 pounds at home.  She feels gassy and nauseous in the mornng and hasn't been able to eat much recently.  She is taking Miralax for constipation.  Her BM's are small and watery.  She has take the fluoxetine 3 times so far.

## 2020-07-07 NOTE — REVIEW OF SYSTEMS
[Fatigue] : fatigue [Recent Change In Weight] : ~T recent weight change [Earache] : earache [Hoarseness] : hoarseness [Nasal Discharge] : nasal discharge [Postnasal Drip] : postnasal drip [Shortness Of Breath] : shortness of breath [Constipation] : constipation [Insomnia] : insomnia [Anxiety] : anxiety [Negative] : Heme/Lymph [Fever] : no fever [Chills] : no chills [Discharge] : no discharge [Suicidal] : not suicidal [Nausea] : no nausea [Depression] : no depression [FreeTextEntry4] : dry throat [FreeTextEntry7] : decreased appetite [FreeTextEntry8] : pain in pelvic area

## 2020-07-07 NOTE — PHYSICAL EXAM
[No Acute Distress] : no acute distress [Well Developed] : well developed [Cachectic] : cachexia was observed [Normal Oropharynx] : the oropharynx was normal [Normal Outer Ear/Nose] : the outer ears and nose were normal in appearance [Normal Sclera/Conjunctiva] : normal sclera/conjunctiva [Normal TMs] : both tympanic membranes were normal [Supple] : supple [No Lymphadenopathy] : no lymphadenopathy [No Respiratory Distress] : no respiratory distress  [No Accessory Muscle Use] : no accessory muscle use [Clear to Auscultation] : lungs were clear to auscultation bilaterally [Regular Rhythm] : with a regular rhythm [Normal Rate] : normal rate  [Normal S1, S2] : normal S1 and S2 [No Edema] : there was no peripheral edema [Soft] : abdomen soft [Non Tender] : non-tender [Non-distended] : non-distended [Normal Bowel Sounds] : normal bowel sounds [No HSM] : no HSM [Coordination Grossly Intact] : coordination grossly intact [No Focal Deficits] : no focal deficits [Speech Grossly Normal] : speech grossly normal [Memory Grossly Normal] : memory grossly normal [Normal Affect] : the affect was normal [Normal Insight/Judgement] : insight and judgment were intact [de-identified] : 2/6 systolic murmur [de-identified] : she is very anxious

## 2020-07-08 LAB
ALBUMIN SERPL ELPH-MCNC: 5.3 G/DL
ALP BLD-CCNC: 65 U/L
ALT SERPL-CCNC: 20 U/L
ANION GAP SERPL CALC-SCNC: 19 MMOL/L
AST SERPL-CCNC: 15 U/L
BASOPHILS # BLD AUTO: 0.02 K/UL
BASOPHILS NFR BLD AUTO: 0.3 %
BILIRUB SERPL-MCNC: 0.2 MG/DL
BUN SERPL-MCNC: 27 MG/DL
CALCIUM SERPL-MCNC: 10.4 MG/DL
CEA SERPL-MCNC: 2.6 NG/ML
CHLORIDE SERPL-SCNC: 101 MMOL/L
CO2 SERPL-SCNC: 20 MMOL/L
CREAT SERPL-MCNC: 0.9 MG/DL
EOSINOPHIL # BLD AUTO: 0.03 K/UL
EOSINOPHIL NFR BLD AUTO: 0.5 %
FERRITIN SERPL-MCNC: 222 NG/ML
FOLATE SERPL-MCNC: 4.7 NG/ML
GLUCOSE SERPL-MCNC: 163 MG/DL
HCT VFR BLD CALC: 38.6 %
HGB BLD-MCNC: 12.1 G/DL
IMM GRANULOCYTES NFR BLD AUTO: 0.2 %
LYMPHOCYTES # BLD AUTO: 0.94 K/UL
LYMPHOCYTES NFR BLD AUTO: 14.4 %
MAN DIFF?: NORMAL
MCHC RBC-ENTMCNC: 31.3 GM/DL
MCHC RBC-ENTMCNC: 31.6 PG
MCV RBC AUTO: 100.8 FL
MONOCYTES # BLD AUTO: 0.61 K/UL
MONOCYTES NFR BLD AUTO: 9.3 %
NEUTROPHILS # BLD AUTO: 4.92 K/UL
NEUTROPHILS NFR BLD AUTO: 75.3 %
PLATELET # BLD AUTO: 286 K/UL
POTASSIUM SERPL-SCNC: 4.4 MMOL/L
PROT SERPL-MCNC: 7.1 G/DL
RBC # BLD: 3.83 M/UL
RBC # FLD: 12.3 %
SODIUM SERPL-SCNC: 139 MMOL/L
TSH SERPL-ACNC: 2 UIU/ML
VIT B12 SERPL-MCNC: 249 PG/ML
WBC # FLD AUTO: 6.53 K/UL

## 2020-07-09 LAB
BACTERIA UR CULT: NORMAL
ESTIMATED AVERAGE GLUCOSE: 97 MG/DL
HBA1C MFR BLD HPLC: 5 %

## 2020-07-13 ENCOUNTER — APPOINTMENT (OUTPATIENT)
Dept: FAMILY MEDICINE | Facility: CLINIC | Age: 75
End: 2020-07-13
Payer: MEDICARE

## 2020-07-13 PROCEDURE — 99441: CPT

## 2020-07-13 NOTE — PLAN
[FreeTextEntry1] : she was advised to continue the fluoxetine, she will call for a diazepam renewal when needed, she will follow up with GI for the endoscopy and colonoscopy next week and with me in 2 weeks or sooner prn

## 2020-07-13 NOTE — REVIEW OF SYSTEMS
[Fatigue] : fatigue [Anxiety] : anxiety [Insomnia] : insomnia [Negative] : Heme/Lymph [Suicidal] : not suicidal [Depression] : no depression

## 2020-07-13 NOTE — HISTORY OF PRESENT ILLNESS
[Home] : at home, [unfilled] , at the time of the visit. [Medical Office: ([facility])___] : at the medical office located in  [Verbal consent obtained from patient] : the patient, [unfilled] [FreeTextEntry1] : Pt presents for a follow on her anxiety and weight loss [de-identified] : The patient telephoned and requested a call back to discuss their health.  The visit was performed over the telephone as the patient was unable to be seen in the office due to the COVID 19 pandemic.  She is able to eat a little better, she is still feeling fatigued and weak.  She weighed herself just now and it said 85 pounds.  She is taking the fluoxetine daily\par

## 2020-07-27 ENCOUNTER — APPOINTMENT (OUTPATIENT)
Dept: FAMILY MEDICINE | Facility: CLINIC | Age: 75
End: 2020-07-27
Payer: MEDICARE

## 2020-07-27 PROCEDURE — 99441: CPT

## 2020-07-27 NOTE — PLAN
[FreeTextEntry1] : I renewed her meds, she will continue on the fluoxetine and omeprazole and follow up in 2 weeks or sooner prn

## 2020-07-27 NOTE — REVIEW OF SYSTEMS
[Fatigue] : fatigue [Recent Change In Weight] : ~T recent weight change [Postnasal Drip] : postnasal drip [Nausea] : nausea [Constipation] : constipation [Dizziness] : dizziness [Anxiety] : anxiety [Negative] : Heme/Lymph [Diarrhea] : diarrhea [Vomiting] : no vomiting [Suicidal] : not suicidal [Depression] : no depression [Insomnia] : no insomnia

## 2020-07-27 NOTE — HISTORY OF PRESENT ILLNESS
[Medical Office: ([facility])___] : at the medical office located in  [Home] : at home, [unfilled] , at the time of the visit. [Verbal consent obtained from patient] : the patient, [unfilled] [FreeTextEntry1] : Pt presents for a follow up on her weight loss and sinus infection. [de-identified] : The patient telephoned and requested a call back to discuss their health.  The visit was performed over the telephone as the patient was unable to be seen in the office due to the COVID 19 pandemic.  She had the upper endoscopy and colonoscopy last week, there were no significant findings, she did have some inflammation in her stomach and polyps in her stomach and the omeprazole was increased to bid.  She is taking the fluoxetine daily.  She still has the sinus congestion and wants to stay on the bactrim.  She doesn't have any further UTI symptoms but would like to continue the cefdinir for now.  Her weight is 78 pounds.  She has been nauseous, she was prescribed compazine by GI and would like a renewal.\par

## 2020-08-10 ENCOUNTER — APPOINTMENT (OUTPATIENT)
Dept: FAMILY MEDICINE | Facility: CLINIC | Age: 75
End: 2020-08-10
Payer: MEDICARE

## 2020-08-10 VITALS — BODY MASS INDEX: 12.48 KG/M2 | WEIGHT: 75 LBS

## 2020-08-10 PROCEDURE — 99442: CPT

## 2020-08-10 RX ORDER — PAROXETINE HYDROCHLORIDE 10 MG/1
10 TABLET, FILM COATED ORAL
Refills: 0 | Status: DISCONTINUED | COMMUNITY
End: 2020-08-10

## 2020-08-10 RX ORDER — FLUOXETINE HYDROCHLORIDE 10 MG/1
10 CAPSULE ORAL
Qty: 30 | Refills: 2 | Status: DISCONTINUED | COMMUNITY
Start: 2020-06-30 | End: 2020-08-10

## 2020-08-10 NOTE — HISTORY OF PRESENT ILLNESS
[Home] : at home, [unfilled] , at the time of the visit. [Medical Office: ([facility])___] : at the medical office located in  [Verbal consent obtained from patient] : the patient, [unfilled] [FreeTextEntry1] : Pt presents for a follow up on her weight and her mood [de-identified] : She is feeling more agitated and nauseous since starting the fluoxetine and she wants to stop it.  She would like to increase the diazepam to twice a day for now. Her weight is 75 pounds and she wants to eat but still feels nauseous at times and would like to renew the compazine.  She followed up with Dr Conklin and was advised to follow a bland diet.  Her sinuses are still congested and she would like to renew the Bactrim.

## 2020-08-10 NOTE — REVIEW OF SYSTEMS
[Fatigue] : fatigue [Recent Change In Weight] : ~T recent weight change [Insomnia] : insomnia [Depression] : depression [Anxiety] : anxiety [Negative] : Neurological [Suicidal] : not suicidal

## 2020-08-10 NOTE — PLAN
[FreeTextEntry1] : she will discontinue the fluoxetine, I increased the diazepam to 5 mg bid, she isn't driving anymore, she will increase intake of food as tolerated, she will continue the antibiotics but I advised her that she will need to stop them in the near future, I-STOP was checked, she was provided emotional support

## 2020-08-24 ENCOUNTER — APPOINTMENT (OUTPATIENT)
Dept: FAMILY MEDICINE | Facility: CLINIC | Age: 75
End: 2020-08-24
Payer: MEDICARE

## 2020-08-24 DIAGNOSIS — N39.0 URINARY TRACT INFECTION, SITE NOT SPECIFIED: ICD-10-CM

## 2020-08-24 PROCEDURE — 99441: CPT

## 2020-08-24 NOTE — PLAN
[FreeTextEntry1] : she was advised to resume mirtazapine and a new rx was escribed, I renewed the omeprazole, I sent in renewals of the antibiotics but she will only take them for recurrence of symptoms, she was provided emotional support and will follow up in 2 weeks or sooner prn

## 2020-08-24 NOTE — HISTORY OF PRESENT ILLNESS
[Home] : at home, [unfilled] , at the time of the visit. [Verbal consent obtained from patient] : the patient, [unfilled] [Medical Office: ([facility])___] : at the medical office located in  [FreeTextEntry1] : Pt presents for a follow up visit. [de-identified] : The patient telephoned and requested a call back to discuss their health.  The visit was performed over the telephone as the patient was unable to be seen in the office due to the COVID 19 pandemic.  She is still very anxious and now isn't sleeping as well.  Her sinuses are slowly improving, she will finish the antibiotics and would like renewals of both to have on hand.  She is eating better and hasn't lost any more weight.  She is less nauseous.  \par

## 2020-08-25 RX ORDER — MIRTAZAPINE 7.5 MG/1
7.5 TABLET, FILM COATED ORAL
Qty: 30 | Refills: 3 | Status: DISCONTINUED | COMMUNITY
Start: 2020-08-24 | End: 2020-08-25

## 2020-09-04 ENCOUNTER — APPOINTMENT (OUTPATIENT)
Dept: FAMILY MEDICINE | Facility: CLINIC | Age: 75
End: 2020-09-04
Payer: MEDICARE

## 2020-09-04 PROCEDURE — 99441: CPT

## 2020-09-04 RX ORDER — SERTRALINE 25 MG/1
25 TABLET, FILM COATED ORAL DAILY
Qty: 30 | Refills: 3 | Status: DISCONTINUED | COMMUNITY
Start: 2020-08-25 | End: 2020-09-04

## 2020-09-04 RX ORDER — CEFDINIR 300 MG/1
300 CAPSULE ORAL
Qty: 28 | Refills: 0 | Status: DISCONTINUED | COMMUNITY
Start: 2020-04-01 | End: 2020-09-04

## 2020-09-04 RX ORDER — SULFAMETHOXAZOLE AND TRIMETHOPRIM 800; 160 MG/1; MG/1
800-160 TABLET ORAL
Qty: 28 | Refills: 0 | Status: DISCONTINUED | COMMUNITY
Start: 2020-08-10 | End: 2020-09-04

## 2020-09-04 NOTE — HISTORY OF PRESENT ILLNESS
[Home] : at home, [unfilled] , at the time of the visit. [Medical Office: ([facility])___] : at the medical office located in  [Verbal consent obtained from patient] : the patient, [unfilled] [FreeTextEntry1] : Pt presents for a follow up on her weight and anxiety [de-identified] : The patient telephoned and requested a call back to discuss their health.  The visit was performed over the telephone as the patient was unable to be seen in the office due to the COVID 19 pandemic.  She is eating better but hasn't regained any weight yet.  She is still feeling weak but has been a little more active lately.  She is going to stop both antibiotics and monitor herself for any UTI symptoms or worsening sinus symptoms.  She took 1 dose of sertraline and then slept for 4 hours and stopped taking it.  She still isn't sleeping well and asking if she should take melatonin.  She is taking 1-2 diazepam daily.  She is needing less compazine.\par

## 2020-09-04 NOTE — PLAN
[FreeTextEntry1] : she will stop the antibiotics, she may start melatonin 3 mg nightly, she was advised to continue to push herself to eat and to be a little more physically active as she is able to, she will stay off of sertraline and continue diazepam as prescribed, she was given significant emotional support and plans to start counseling

## 2020-09-18 ENCOUNTER — APPOINTMENT (OUTPATIENT)
Dept: FAMILY MEDICINE | Facility: CLINIC | Age: 75
End: 2020-09-18
Payer: MEDICARE

## 2020-09-18 PROCEDURE — 99442: CPT

## 2020-09-18 NOTE — PLAN
[FreeTextEntry1] : she was advised to try miralax for the constipation, I renewed the bactrim, she was encouraged to continue to eat regular meals and she will try drinking muscle milk also and will follow up in 2 weeks or sooner prn

## 2020-09-18 NOTE — HISTORY OF PRESENT ILLNESS
[Home] : at home, [unfilled] , at the time of the visit. [Medical Office: ([facility])___] : at the medical office located in  [Verbal consent obtained from patient] : the patient, [unfilled] [de-identified] : Pt presents for a follow up on her weight loss, sinus congestion and anxiety.  She is eating 3 meals a day and snacking in between and her weight is up to 80 pounds.  She is taking 1-2 diazepam daily only at night and feels a little less anxious.  Her BP was low and she was dizzy, she spoke with her cardiologist and the lisinopril was stopped.  Today her BP is 116/67.  She has been a little constipated and plans to take miralax.  She stopped both antibiotics but after 2 days felt achy and had more sinus pressure and resumed the bactrim and needs a renewal.

## 2020-09-18 NOTE — REVIEW OF SYSTEMS
[Fatigue] : fatigue [Recent Change In Weight] : ~T recent weight change [Nasal Discharge] : nasal discharge [Postnasal Drip] : postnasal drip [Muscle Weakness] : muscle weakness [Muscle Pain] : muscle pain [Negative] : Heme/Lymph [Fever] : no fever

## 2020-10-02 ENCOUNTER — APPOINTMENT (OUTPATIENT)
Dept: FAMILY MEDICINE | Facility: CLINIC | Age: 75
End: 2020-10-02
Payer: MEDICARE

## 2020-10-02 VITALS — WEIGHT: 82 LBS | DIASTOLIC BLOOD PRESSURE: 52 MMHG | SYSTOLIC BLOOD PRESSURE: 96 MMHG | BODY MASS INDEX: 13.65 KG/M2

## 2020-10-02 PROCEDURE — 99442: CPT

## 2020-10-02 NOTE — HISTORY OF PRESENT ILLNESS
[Home] : at home, [unfilled] , at the time of the visit. [Medical Office: ([facility])___] : at the medical office located in  [Verbal consent obtained from patient] : the patient, [unfilled] [FreeTextEntry1] : Pt presents for a follow up on her weight loss, fatigue, anxiety and sinus congestion. [de-identified] : The patient telephoned and requested a call back to discuss their health.  The visit was performed over the telephone as the patient was unable to be seen in the office due to the COVID 19 pandemic.  She is eating better and her weight was 82 pounds this morning.  Her BP has been usually in the 100-110 range systolic, today it was 96/52.  Dr Soler had advised her to add salt to her food and she is doing so.  She is still a little constipated and is eating Fiber One bars daily.  She is drinking Muscle Milk.  She is feeling a little stronger.  She is finishing up the course of Bactrim and the sinus pressure has improved.  She is feeling a little less anxious.  She is still a little nauseous only in the morning.  She is taking Miralax daily.\par

## 2020-10-02 NOTE — PLAN
[FreeTextEntry1] : she will continue to eat foods that she likes and tolerates and continue the protein supplements, no medication changes were made, she was given emotional support and may renew the bactrim for persistent sinus pain or pressure

## 2020-10-15 ENCOUNTER — APPOINTMENT (OUTPATIENT)
Dept: FAMILY MEDICINE | Facility: CLINIC | Age: 75
End: 2020-10-15
Payer: MEDICARE

## 2020-10-15 VITALS
BODY MASS INDEX: 14.31 KG/M2 | SYSTOLIC BLOOD PRESSURE: 115 MMHG | DIASTOLIC BLOOD PRESSURE: 65 MMHG | HEART RATE: 78 BPM | WEIGHT: 86 LBS

## 2020-10-15 PROCEDURE — 99442: CPT

## 2020-10-15 NOTE — PLAN
[FreeTextEntry1] : she was advised to continue her current diet and weight monitoring, she will stop the bactrim and call if sinus pain or pressure recur, she will continue diazepam and fiber therapy and follow up in 2 weeks or sooner prn, significant emotional support was provided

## 2020-10-15 NOTE — HISTORY OF PRESENT ILLNESS
[Medical Office: ([facility])___] : at the medical office located in  [Home] : at home, [unfilled] , at the time of the visit. [Verbal consent obtained from patient] : the patient, [unfilled] [FreeTextEntry1] : Pt presents for a follow up on her weight loss and sinus congestion.\par  [de-identified] : The patient telephoned and requested a call back to discuss their health.  The visit was performed over the telephone as the patient was unable to be seen in the office due to the COVID 19 pandemic.  She is eating better and is only a little nauseous in the morning.  Her weight is up to 86 pounds and her BP today is 115/65.  Her BM's are better.  She is sleeping well and feeling less anxious.  She is taking 1 diazepam at 9:00 pm and sometimes takes one during the day but not every day.  Her sinus pain is better and she will stop taking the bactrim.\par

## 2020-10-29 ENCOUNTER — APPOINTMENT (OUTPATIENT)
Dept: FAMILY MEDICINE | Facility: CLINIC | Age: 75
End: 2020-10-29
Payer: MEDICARE

## 2020-10-29 DIAGNOSIS — Z87.898 PERSONAL HISTORY OF OTHER SPECIFIED CONDITIONS: ICD-10-CM

## 2020-10-29 PROCEDURE — 99441: CPT

## 2020-10-29 RX ORDER — PROCHLORPERAZINE MALEATE 10 MG/1
10 TABLET ORAL EVERY 6 HOURS
Qty: 30 | Refills: 0 | Status: DISCONTINUED | COMMUNITY
Start: 2019-10-02 | End: 2020-10-29

## 2020-10-29 NOTE — HISTORY OF PRESENT ILLNESS
[Home] : at home, [unfilled] , at the time of the visit. [Medical Office: ([facility])___] : at the medical office located in  [Verbal consent obtained from patient] : the patient, [unfilled] [FreeTextEntry1] : Pt presents for a follow up on her sinus congestion, weight and anxiety. [de-identified] : The patient telephoned and requested a call back to discuss their health.  The visit was performed over the telephone as the patient was unable to be seen in the office due to the COVID 19 pandemic.  She is eating much better and her weight is 89 pounds.  She hasn't been nauseous in the past 2 weeks.  She is eating a high fiber cereal and taking 1 and 1/2 doses of Miralax daily and having daily BM's.  For the past few days she has had worsening sinus congestion and resumed the bactrim and needs a renewal.  Her BP is in the 110's systolic.  She had 1 day of swelling of her feet and ankles and decreased her sodium intake and it resolved.  She is taking diazepam nightly and sleeping well and taking 1 during the day a few times per week.\par

## 2020-10-29 NOTE — PLAN
[FreeTextEntry1] : she was encouraged to continue her eating regimen and to continue on the low sodium diet, I will renew the bactrim for the sinusitis, no other medication changes were made and she will follow up in 2 weeks or sooner prn

## 2020-11-11 ENCOUNTER — APPOINTMENT (OUTPATIENT)
Dept: FAMILY MEDICINE | Facility: CLINIC | Age: 75
End: 2020-11-11
Payer: MEDICARE

## 2020-11-11 PROCEDURE — 99441: CPT

## 2020-11-11 NOTE — PLAN
[FreeTextEntry1] : she was advised to continue current diet and medications, I-STOP was checked and diazepam and compazine were renewed, she will follow up in 2 weeks or sooner prn

## 2020-11-11 NOTE — HISTORY OF PRESENT ILLNESS
[Home] : at home, [unfilled] , at the time of the visit. [Medical Office: ([facility])___] : at the medical office located in  [Verbal consent obtained from patient] : the patient, [unfilled] [FreeTextEntry1] : Pt presents for a follow up on her weight loss and sinus infection and anxiety [de-identified] : The patient telephoned and requested a call back to discuss their health.  The visit was performed over the telephone as the patient was unable to be seen in the office due to the COVID 19 pandemic.  She is consuming about 2400 calories per day and her weight is up to 90 pounds.  She only has occasional nausea and did take the compazine this morning.  She is having daily BM's with the high fiber diet and Miralax.  She has been checking her BP and it is in the 110-120/70 range.  She is taking the diazepam nightly and taking a 2nd does a few times per week and is doing well emotionally.  The sinus infection is clearing up with the Bactrim\par

## 2020-11-18 ENCOUNTER — TRANSCRIPTION ENCOUNTER (OUTPATIENT)
Age: 75
End: 2020-11-18

## 2020-11-19 ENCOUNTER — LABORATORY RESULT (OUTPATIENT)
Age: 75
End: 2020-11-19

## 2020-11-19 ENCOUNTER — APPOINTMENT (OUTPATIENT)
Dept: FAMILY MEDICINE | Facility: CLINIC | Age: 75
End: 2020-11-19
Payer: MEDICARE

## 2020-11-19 VITALS — HEART RATE: 92 BPM | DIASTOLIC BLOOD PRESSURE: 60 MMHG | SYSTOLIC BLOOD PRESSURE: 144 MMHG

## 2020-11-19 VITALS
RESPIRATION RATE: 16 BRPM | TEMPERATURE: 97.9 F | OXYGEN SATURATION: 98 % | SYSTOLIC BLOOD PRESSURE: 160 MMHG | HEART RATE: 98 BPM | DIASTOLIC BLOOD PRESSURE: 95 MMHG | WEIGHT: 90 LBS | HEIGHT: 66 IN | BODY MASS INDEX: 14.46 KG/M2

## 2020-11-19 VITALS — DIASTOLIC BLOOD PRESSURE: 95 MMHG | SYSTOLIC BLOOD PRESSURE: 163 MMHG

## 2020-11-19 PROCEDURE — 99213 OFFICE O/P EST LOW 20 MIN: CPT

## 2020-11-19 NOTE — PHYSICAL EXAM
[No Acute Distress] : no acute distress [Well Developed] : well developed [Well-Appearing] : well-appearing [Normal Voice/Communication] : normal voice/communication [Coordination Grossly Intact] : coordination grossly intact [No Focal Deficits] : no focal deficits [Speech Grossly Normal] : speech grossly normal [Memory Grossly Normal] : memory grossly normal [Normal Affect] : the affect was normal [Normal Mood] : the mood was normal [Normal Insight/Judgement] : insight and judgment were intact [de-identified] : cluster of vesicles on left labia majora with surrounding erythema

## 2020-11-19 NOTE — HISTORY OF PRESENT ILLNESS
[FreeTextEntry8] : Patient presents for rash.\par Red, itchy, burn.\par She took a shower on Sunday and since then she has had an itchy rash in the groin area.  She has been monitoring her BP a home and it has been in the 110-120 range systolic.  She had applied vaseline, mometasone cream and antibacterial cream.

## 2020-11-19 NOTE — REVIEW OF SYSTEMS
[Recent Change In Weight] : ~T recent weight change [Itching] : Itching [Skin Rash] : skin rash [Negative] : Heme/Lymph [FreeTextEntry2] : she has gained weight

## 2020-11-19 NOTE — PLAN
[FreeTextEntry1] : she was advised to take valtrex as directed and to follow up next week for her telephone visit or sooner prn

## 2020-11-20 LAB
ALBUMIN SERPL ELPH-MCNC: 4.8 G/DL
ALP BLD-CCNC: 116 U/L
ALT SERPL-CCNC: 38 U/L
ANION GAP SERPL CALC-SCNC: 16 MMOL/L
AST SERPL-CCNC: 28 U/L
BASOPHILS # BLD AUTO: 0.04 K/UL
BASOPHILS NFR BLD AUTO: 0.7 %
BILIRUB SERPL-MCNC: 0.2 MG/DL
BUN SERPL-MCNC: 21 MG/DL
CALCIUM SERPL-MCNC: 9.9 MG/DL
CHLORIDE SERPL-SCNC: 100 MMOL/L
CO2 SERPL-SCNC: 20 MMOL/L
CREAT SERPL-MCNC: 0.86 MG/DL
EOSINOPHIL # BLD AUTO: 0.17 K/UL
EOSINOPHIL NFR BLD AUTO: 3.1 %
FOLATE SERPL-MCNC: 9 NG/ML
GLUCOSE SERPL-MCNC: 97 MG/DL
HCT VFR BLD CALC: 36.7 %
HGB BLD-MCNC: 12 G/DL
IMM GRANULOCYTES NFR BLD AUTO: 0.2 %
LYMPHOCYTES # BLD AUTO: 1.29 K/UL
LYMPHOCYTES NFR BLD AUTO: 23.3 %
MAN DIFF?: NORMAL
MCHC RBC-ENTMCNC: 31.8 PG
MCHC RBC-ENTMCNC: 32.7 GM/DL
MCV RBC AUTO: 97.3 FL
MONOCYTES # BLD AUTO: 0.82 K/UL
MONOCYTES NFR BLD AUTO: 14.8 %
NEUTROPHILS # BLD AUTO: 3.2 K/UL
NEUTROPHILS NFR BLD AUTO: 57.9 %
PLATELET # BLD AUTO: 208 K/UL
POTASSIUM SERPL-SCNC: 4.4 MMOL/L
PROT SERPL-MCNC: 6.8 G/DL
RBC # BLD: 3.77 M/UL
RBC # FLD: 12.2 %
SODIUM SERPL-SCNC: 135 MMOL/L
TSH SERPL-ACNC: 5.9 UIU/ML
VIT B12 SERPL-MCNC: 388 PG/ML
WBC # FLD AUTO: 5.53 K/UL

## 2020-11-24 ENCOUNTER — APPOINTMENT (OUTPATIENT)
Dept: FAMILY MEDICINE | Facility: CLINIC | Age: 75
End: 2020-11-24
Payer: MEDICARE

## 2020-11-24 PROCEDURE — 99441: CPT

## 2020-11-24 NOTE — HISTORY OF PRESENT ILLNESS
[Home] : at home, [unfilled] , at the time of the visit. [Medical Office: ([facility])___] : at the medical office located in  [Verbal consent obtained from patient] : the patient, [unfilled] [FreeTextEntry1] : Pt presents for a follow up from the rash and her weight loss and her sinus infection. [de-identified] : The patient telephoned and requested a call back to discuss their health.  The visit was performed over the telephone as the patient was unable to be seen in the office due to the COVID 19 pandemic.  The rash on the genital area is less painful and the blisters are drying up.  She is tolerating the valtrex but would like a renewal in case the rash worsens after she stops the treatment.  She still has some sinus congestion and a scratchy throat and would like to renew the bactrim once more.  Her weight is still 90 pounds.  Her BP today was 118/67and is usually in the 120/60 range.\par

## 2020-11-24 NOTE — PLAN
[FreeTextEntry1] : her medications were renewed, she will continue to follow the high calorie diet and follow up in 2 weeks in the office if she is able to do so or sooner prn

## 2020-12-08 ENCOUNTER — APPOINTMENT (OUTPATIENT)
Dept: FAMILY MEDICINE | Facility: CLINIC | Age: 75
End: 2020-12-08
Payer: MEDICARE

## 2020-12-08 PROCEDURE — 99441: CPT

## 2020-12-08 RX ORDER — GUAIFENESIN 400 MG
400 TABLET ORAL
Qty: 30 | Refills: 0 | Status: DISCONTINUED | COMMUNITY
Start: 2018-01-14 | End: 2020-12-08

## 2020-12-08 NOTE — PLAN
[FreeTextEntry1] : I renewed fluticasone, she will finish the bactrim and continue high calorie diet and medications and follow up in 2 weeks or sooner prn

## 2020-12-08 NOTE — HISTORY OF PRESENT ILLNESS
[Home] : at home, [unfilled] , at the time of the visit. [Medical Office: ([facility])___] : at the medical office located in  [Verbal consent obtained from patient] : the patient, [unfilled] [FreeTextEntry1] : Pt presents for follow up on her sinus infection, shingles and anxiety [de-identified] : The patient telephoned and requested a call back to discuss their health.  The visit was performed over the telephone as the patient was unable to be seen in the office due to the COVID 19 pandemic.  She is feeling better regarding the shingles, the rash resolved and she has only minor itching in the area.  She still has some sinus pain and pressure and will finish the bactrim soon.  Her weight is still 90 pounds and her BP is in the 110-120/60 range usually and today it was 131/69.  She hasn't been constipated.  She is a little anxious due to the covid pandemic but her mood is overall better\par

## 2020-12-22 ENCOUNTER — APPOINTMENT (OUTPATIENT)
Dept: FAMILY MEDICINE | Facility: CLINIC | Age: 75
End: 2020-12-22
Payer: MEDICARE

## 2020-12-22 PROCEDURE — 99441: CPT

## 2020-12-22 NOTE — HISTORY OF PRESENT ILLNESS
[Home] : at home, [unfilled] , at the time of the visit. [Medical Office: ([facility])___] : at the medical office located in  [Verbal consent obtained from patient] : the patient, [unfilled] [FreeTextEntry1] : Pt presents for a follow up on her weight and sinus symptoms [de-identified] : The patient telephoned and requested a call back to discuss their health.  The visit was performed over the telephone as the patient was unable to be seen in the office due to the COVID 19 pandemic.  She started with mild body aches and sinus pain and pressure 5 days ago, it is lingering and she would like to go back on Bactrim.  She also had some pain in the bladder area but it resolved.  Her anxiety is stable and she needs diazepam renewed.  Her weight is still 90 pounds but she has been able to be more active.  Her BPis in the 110/60 range.\par

## 2020-12-22 NOTE — PLAN
[FreeTextEntry1] : I-STOP was checked, medications were renewed, she will continue the high calorie diet and follow up in 2 weeks or sooner prn

## 2021-01-08 ENCOUNTER — APPOINTMENT (OUTPATIENT)
Dept: FAMILY MEDICINE | Facility: CLINIC | Age: 76
End: 2021-01-08
Payer: MEDICARE

## 2021-01-08 DIAGNOSIS — B02.9 ZOSTER W/OUT COMPLICATIONS: ICD-10-CM

## 2021-01-08 PROCEDURE — 99441: CPT

## 2021-01-08 NOTE — PLAN
[FreeTextEntry1] : she was advised to rest and to take the bactrim and Tylenol as needed and to follow up early next week if unimproved and in 2 weeks again

## 2021-01-08 NOTE — HISTORY OF PRESENT ILLNESS
[Home] : at home, [unfilled] , at the time of the visit. [Medical Office: ([facility])___] : at the medical office located in  [Verbal consent obtained from patient] : the patient, [unfilled] [FreeTextEntry1] : Pt presents for a follow up on her ongoing sinus symptoms and weight. [de-identified] : The patient telephoned and requested a call back to discuss their health.  The visit was performed over the telephone as the patient was unable to be seen in the office due to the COVID 19 pandemic.  She hasn't been feeling well for the past week or so.  On 12/31 her BP was elevated but then it improved and for the past 3 days she has a headache and body aches.  She has some sinus congestion.  She has been staying home and hasn't seen her family since 12/25 and they were all wearing masks.  She is able to taste and smell and hasn't had a fever, cough or loose stools.  Her BP today was 145/73 then went down to 138/73 with a heart rate of 74 and 80.  Her weight fluctuates from 90-92.  She just renewed the bactrim and would like to start it.\par

## 2021-01-12 ENCOUNTER — TRANSCRIPTION ENCOUNTER (OUTPATIENT)
Age: 76
End: 2021-01-12

## 2021-01-13 ENCOUNTER — NON-APPOINTMENT (OUTPATIENT)
Age: 76
End: 2021-01-13

## 2021-01-15 ENCOUNTER — NON-APPOINTMENT (OUTPATIENT)
Age: 76
End: 2021-01-15

## 2021-01-17 LAB — SARS-COV-2 N GENE NPH QL NAA+PROBE: NOT DETECTED

## 2021-01-20 ENCOUNTER — APPOINTMENT (OUTPATIENT)
Dept: FAMILY MEDICINE | Facility: CLINIC | Age: 76
End: 2021-01-20
Payer: MEDICARE

## 2021-01-20 PROCEDURE — 99442: CPT

## 2021-01-20 NOTE — PLAN
[FreeTextEntry1] : she was advised to continue the augmentin and the prednisone, she was advised to check her BP 3 times a day and to continue to monitor her temperature and to call me in 2 days

## 2021-01-20 NOTE — HISTORY OF PRESENT ILLNESS
[Home] : at home, [unfilled] , at the time of the visit. [Medical Office: ([facility])___] : at the medical office located in  [Verbal consent obtained from patient] : the patient, [unfilled] [FreeTextEntry1] : Pt presents for a follow up on her sinus infection and weakness. [de-identified] : The patient telephoned and requested a call back to discuss their health.  The visit was performed over the telephone as the patient was unable to be seen in the office due to the COVID 19 pandemic.  She started the prednisone on 1/18.  Today she feels slight improvement in the headache and body aches.  Her BP has been in the 120-140/70-90 range.  Today her BP was 128/75 and 130/71 with a heart rate of 73.  She still feels fatigued.  Her temperature on 1/18 was 99.5, yesterday was 98.3 and today is 99 degrees.  Her weight is 88-89, she is trying to eat.\par

## 2021-01-21 ENCOUNTER — LABORATORY RESULT (OUTPATIENT)
Age: 76
End: 2021-01-21

## 2021-01-21 ENCOUNTER — NON-APPOINTMENT (OUTPATIENT)
Age: 76
End: 2021-01-21

## 2021-01-21 ENCOUNTER — TRANSCRIPTION ENCOUNTER (OUTPATIENT)
Age: 76
End: 2021-01-21

## 2021-01-26 LAB
ALBUMIN SERPL ELPH-MCNC: 4.5 G/DL
ALP BLD-CCNC: 66 U/L
ALT SERPL-CCNC: 50 U/L
ANION GAP SERPL CALC-SCNC: 14 MMOL/L
AST SERPL-CCNC: 17 U/L
BASOPHILS # BLD AUTO: 0.02 K/UL
BASOPHILS NFR BLD AUTO: 0.3 %
BILIRUB SERPL-MCNC: 0.2 MG/DL
BUN SERPL-MCNC: 29 MG/DL
CALCIUM SERPL-MCNC: 9.7 MG/DL
CHLORIDE SERPL-SCNC: 104 MMOL/L
CO2 SERPL-SCNC: 21 MMOL/L
CREAT SERPL-MCNC: 0.9 MG/DL
EOSINOPHIL # BLD AUTO: 0.12 K/UL
EOSINOPHIL NFR BLD AUTO: 1.5 %
GLUCOSE SERPL-MCNC: 157 MG/DL
HCT VFR BLD CALC: 39.2 %
HGB BLD-MCNC: 12.3 G/DL
IMM GRANULOCYTES NFR BLD AUTO: 0.8 %
LYMPHOCYTES # BLD AUTO: 2.67 K/UL
LYMPHOCYTES NFR BLD AUTO: 33.7 %
MAN DIFF?: NORMAL
MCHC RBC-ENTMCNC: 30.8 PG
MCHC RBC-ENTMCNC: 31.4 GM/DL
MCV RBC AUTO: 98 FL
MONOCYTES # BLD AUTO: 0.87 K/UL
MONOCYTES NFR BLD AUTO: 11 %
NEUTROPHILS # BLD AUTO: 4.18 K/UL
NEUTROPHILS NFR BLD AUTO: 52.7 %
PLATELET # BLD AUTO: 286 K/UL
POTASSIUM SERPL-SCNC: 4.3 MMOL/L
PROT SERPL-MCNC: 6.6 G/DL
RBC # BLD: 4 M/UL
RBC # FLD: 15.3 %
SODIUM SERPL-SCNC: 139 MMOL/L
TSH SERPL-ACNC: 4.87 UIU/ML
WBC # FLD AUTO: 7.92 K/UL

## 2021-02-02 ENCOUNTER — APPOINTMENT (OUTPATIENT)
Dept: FAMILY MEDICINE | Facility: CLINIC | Age: 76
End: 2021-02-02
Payer: MEDICARE

## 2021-02-02 PROCEDURE — 99442: CPT

## 2021-02-03 ENCOUNTER — NON-APPOINTMENT (OUTPATIENT)
Age: 76
End: 2021-02-03

## 2021-02-08 ENCOUNTER — APPOINTMENT (OUTPATIENT)
Dept: FAMILY MEDICINE | Facility: CLINIC | Age: 76
End: 2021-02-08
Payer: MEDICARE

## 2021-02-08 PROCEDURE — 99441: CPT

## 2021-02-08 NOTE — HISTORY OF PRESENT ILLNESS
[Home] : at home, [unfilled] , at the time of the visit. [Medical Office: ([facility])___] : at the medical office located in  [Verbal consent obtained from patient] : the patient, [unfilled] [FreeTextEntry1] : Pt presents for a follow up on the sinus infection and the weakness.   [de-identified] : The patient telephoned and requested a call back to discuss their health.  The visit was performed over the telephone as the patient was unable to be seen in the office due to the COVID 19 pandemic.  She states that she now only has slight shortness of breath with exertion but it has been improving.  She is having less sinus pain and pressure and is getting less nasal discharge.  She is eating well and hasn't had any nausea.  She hasn't weighed herself lately.  She will renew the bactrim.  Her BP has been normal.  She is sleeping better.  She sometimes takes a valium during the day but not too often.\par

## 2021-02-08 NOTE — PLAN
[FreeTextEntry1] : she was advised to renew the bactrim, no other changes were made and she will follow up in 1 month

## 2021-02-16 ENCOUNTER — APPOINTMENT (OUTPATIENT)
Dept: FAMILY MEDICINE | Facility: CLINIC | Age: 76
End: 2021-02-16
Payer: MEDICARE

## 2021-02-16 PROCEDURE — 99441: CPT

## 2021-02-16 NOTE — PLAN
[FreeTextEntry1] : she was advised to continue current medications and activity and follow up in 1 week

## 2021-02-16 NOTE — HISTORY OF PRESENT ILLNESS
[Home] : at home, [unfilled] , at the time of the visit. [Medical Office: ([facility])___] : at the medical office located in  [Verbal consent obtained from patient] : the patient, [unfilled] [FreeTextEntry1] : Pt presents for a follow up on the sinus infection and weakness. [de-identified] : The patient telephoned and requested a call back to discuss their health.  The visit was performed over the telephone as the patient was unable to be seen in the office due to the COVID 19 pandemic.  She has a little less nasal discharge and feels some improvement in her weakness.  Her BP is in the 120/60 range and her heart rate is in the 70-80 range.  Her weight is 89-90.\par

## 2021-02-25 ENCOUNTER — APPOINTMENT (OUTPATIENT)
Dept: FAMILY MEDICINE | Facility: CLINIC | Age: 76
End: 2021-02-25
Payer: MEDICARE

## 2021-02-25 PROCEDURE — 99441: CPT

## 2021-02-25 NOTE — HISTORY OF PRESENT ILLNESS
[Home] : at home, [unfilled] , at the time of the visit. [Medical Office: ([facility])___] : at the medical office located in  [Verbal consent obtained from patient] : the patient, [unfilled] [FreeTextEntry1] : Pt presents for a follow up on the sinus infection and her weakness. [de-identified] : The patient telephoned and requested a call back to discuss their health.  The visit was performed over the telephone as the patient was unable to be seen in the office due to the COVID 19 pandemic.  She is starting to feel stronger and drove to the bank yesterday.  She is eating well and her weight is 91-92 pounds.  Her BP is in the 120/60 range and her heart rate is in the 80's.  She started feeling pain in her bladder area and started cefdinir and it is helping.  The sinus pain and pressure and nasal discharge are slowly improving on the bactrim.  She is sleeping well with the nightly diazepam and only has taken it bid on a few occasions.\par

## 2021-02-25 NOTE — PLAN
[FreeTextEntry1] : I will renew the cefdinir and bactrim, she will continue other medications without change and will follow up in 1 week or sooner prn

## 2021-03-05 ENCOUNTER — APPOINTMENT (OUTPATIENT)
Dept: FAMILY MEDICINE | Facility: CLINIC | Age: 76
End: 2021-03-05
Payer: MEDICARE

## 2021-03-05 VITALS
SYSTOLIC BLOOD PRESSURE: 128 MMHG | HEART RATE: 72 BPM | WEIGHT: 92 LBS | DIASTOLIC BLOOD PRESSURE: 68 MMHG | BODY MASS INDEX: 14.85 KG/M2

## 2021-03-05 PROCEDURE — 99441: CPT

## 2021-03-05 NOTE — HISTORY OF PRESENT ILLNESS
[Home] : at home, [unfilled] , at the time of the visit. [Medical Office: ([facility])___] : at the medical office located in  [Verbal consent obtained from patient] : the patient, [unfilled] [FreeTextEntry1] : Pt presents for a follow up on her sinus infection, weakness, anxiety and bladder pain [de-identified] : The patient telephoned and requested a call back to discuss their health.  The visit was performed over the telephone as the patient was unable to be seen in the office due to the COVID 19 pandemic.  She is continuing to improve.  She still has some sinus pressure and nasal discharge but feels about 50-60% better.  She has had improvement in the bladder pain.  She is still taking cefdinir and bactrim.  She is sleeping well and would like to renew the diazepam, she still needs one during the day sometimes and takes 1 at bedtime.  Her weight is up to 92 pounds and her BP today was 123/68 with a pulse of 72\par

## 2021-03-05 NOTE — PLAN
[FreeTextEntry1] : I-STOP was checked and diazepam renewed, she will continue to take it for sleep and as needed during the day, she will continue current antibiotic treatment and is overall improving and will follow up in 10 days or sooner prn

## 2021-03-15 ENCOUNTER — APPOINTMENT (OUTPATIENT)
Dept: FAMILY MEDICINE | Facility: CLINIC | Age: 76
End: 2021-03-15
Payer: MEDICARE

## 2021-03-15 VITALS — BODY MASS INDEX: 15.01 KG/M2 | WEIGHT: 93 LBS

## 2021-03-15 PROCEDURE — 99441: CPT

## 2021-03-15 NOTE — PLAN
[FreeTextEntry1] : she was advised to continue the bactrim but to stop the cefdinir in 5-7 days if the bladder symptoms continue to be resolved and to continue other medications without change and to follow up in 2 weeks or sooner prn

## 2021-03-15 NOTE — HISTORY OF PRESENT ILLNESS
[Home] : at home, [unfilled] , at the time of the visit. [Medical Office: ([facility])___] : at the medical office located in  [Verbal consent obtained from patient] : the patient, [unfilled] [FreeTextEntry1] : Pt presents for a follow up on the sinus infection and anxiety [de-identified] : The patient telephoned and requested a call back to discuss their health.  The visit was performed over the telephone as the patient was unable to be seen in the office due to the COVID 19 pandemic.  She reports that she is feeling about 75% better with the sinus infection and would like to stay on the Bactrim.  The bladder symptoms resolved and she will finish the cefdinir.  Her weight in 93-94 pounds and her BP is in the 120/60-70 range.\par

## 2021-03-29 ENCOUNTER — APPOINTMENT (OUTPATIENT)
Dept: FAMILY MEDICINE | Facility: CLINIC | Age: 76
End: 2021-03-29
Payer: MEDICARE

## 2021-03-29 VITALS — BODY MASS INDEX: 15.33 KG/M2 | WEIGHT: 95 LBS

## 2021-03-29 PROCEDURE — 99441: CPT

## 2021-03-29 RX ORDER — CEFDINIR 300 MG/1
300 CAPSULE ORAL
Qty: 28 | Refills: 1 | Status: DISCONTINUED | COMMUNITY
Start: 2021-02-25 | End: 2021-03-29

## 2021-03-29 NOTE — PLAN
[FreeTextEntry1] : she was advised to continue current medications and high calorie diet and to follow up in 2 weeks or sooner prn

## 2021-03-29 NOTE — HISTORY OF PRESENT ILLNESS
[Home] : at home, [unfilled] , at the time of the visit. [Medical Office: ([facility])___] : at the medical office located in  [Verbal consent obtained from patient] : the patient, [unfilled] [FreeTextEntry1] : Pt presents for a follow up on the sinus infection, anxiety and weight loss [de-identified] : The patient telephoned and requested a call back to discuss their health.  The visit was performed over the telephone as the patient was unable to be seen in the office due to the COVID 19 pandemic.  She still has some sinus congestion but it is slowly improving. She is tolerating the bactrim.  She has a good appetite and her weight is up to 95 pounds.  Her BP is in the 110/60 range and her pulse is 70's.  She is sleeping well\par

## 2021-04-12 ENCOUNTER — APPOINTMENT (OUTPATIENT)
Dept: FAMILY MEDICINE | Facility: CLINIC | Age: 76
End: 2021-04-12
Payer: MEDICARE

## 2021-04-12 VITALS — DIASTOLIC BLOOD PRESSURE: 70 MMHG | WEIGHT: 95 LBS | SYSTOLIC BLOOD PRESSURE: 124 MMHG | BODY MASS INDEX: 15.33 KG/M2

## 2021-04-12 PROCEDURE — 99441: CPT

## 2021-04-12 NOTE — PLAN
[FreeTextEntry1] : she will continue the bactrim and monitoring of her BP at home, she will continue the high calorie diet and follow up via phone in 1 week and in a few weeks in the office and will do lab testing then

## 2021-04-12 NOTE — HISTORY OF PRESENT ILLNESS
[Home] : at home, [unfilled] , at the time of the visit. [Medical Office: ([facility])___] : at the medical office located in  [Verbal consent obtained from patient] : the patient, [unfilled] [FreeTextEntry1] : Pt presents for a follow up on her sinus infection and weight. [de-identified] : The patient telephoned and requested a call back to discuss their health.  The visit was performed over the telephone as the patient was unable to be seen in the office due to the COVID 19 pandemic.  She has some worsening pain and pressure in her head and face for the past 4 days.  Her BP was 146 systolic on 4/10 and she took 2.5 mg of lisinopril.  Yesterday it was 131/79 and today it is 124/70.  Her weight is still 95 pounds.  She is sleeping well and only needing the diazepam once a day.  She had her pacemaker checked and she is using it often and will schedule a echo and will need her cholesterol lab tests done.  \par

## 2021-04-21 ENCOUNTER — APPOINTMENT (OUTPATIENT)
Dept: FAMILY MEDICINE | Facility: CLINIC | Age: 76
End: 2021-04-21
Payer: MEDICARE

## 2021-04-21 PROCEDURE — 99441: CPT

## 2021-04-21 NOTE — PLAN
[FreeTextEntry1] : I renewed the bactrim, I-STOP was checked and diazepam renewed and she will follow up in the office in 2 weeks for a recheck and lab testing

## 2021-04-21 NOTE — HISTORY OF PRESENT ILLNESS
[Home] : at home, [unfilled] , at the time of the visit. [Medical Office: ([facility])___] : at the medical office located in  [Verbal consent obtained from patient] : the patient, [unfilled] [FreeTextEntry1] : Pt presents for a follow up on the sinus infection and her weight [de-identified] : The patient telephoned and requested a call back to discuss their health.  The visit was performed over the telephone as the patient was unable to be seen in the office due to the COVID 19 pandemic.  She reports that she is still having some right sided sinus pressure and postnasal drip and still feels a little fatigued.  She is less achy and slowly getting stronger.  Her weight is still 95 pounds and her BP was 109/63 today.  She is sleeping well with the diazepam and would like a renewal.\par

## 2021-04-30 ENCOUNTER — APPOINTMENT (OUTPATIENT)
Dept: FAMILY MEDICINE | Facility: CLINIC | Age: 76
End: 2021-04-30
Payer: MEDICARE

## 2021-04-30 PROCEDURE — 99441: CPT

## 2021-04-30 NOTE — PLAN
[FreeTextEntry1] : she was advised to continue the bactrim and to start the prednisone, to continue nasal sprays and to follow up with me in 5 days or sooner prn

## 2021-04-30 NOTE — HISTORY OF PRESENT ILLNESS
[Home] : at home, [unfilled] , at the time of the visit. [Medical Office: ([facility])___] : at the medical office located in  [Verbal consent obtained from patient] : the patient, [unfilled] [FreeTextEntry8] : The patient telephoned and requested a call back to discuss their health.  The visit was performed over the telephone as the patient was unable to be seen in the office due to the COVID 19 pandemic.  For the past 3 days she has had worsening pain and pressure in her face and she is blowing out bloody mucous from her nose.  She is taking bactrim daily and using her nasal sprays.  She hasn't been spending a lot of time outdoors and has limited exposure to other people.  \par

## 2021-05-05 ENCOUNTER — APPOINTMENT (OUTPATIENT)
Dept: FAMILY MEDICINE | Facility: CLINIC | Age: 76
End: 2021-05-05
Payer: MEDICARE

## 2021-05-05 PROCEDURE — 99441: CPT

## 2021-05-05 NOTE — HISTORY OF PRESENT ILLNESS
[Home] : at home, [unfilled] , at the time of the visit. [Medical Office: ([facility])___] : at the medical office located in  [Verbal consent obtained from patient] : the patient, [unfilled] [FreeTextEntry1] : Pt presents for a follow up on the sinus infection. [de-identified] : The patient telephoned and requested a call back to discuss their health.  The visit was performed over the telephone as the patient was unable to be seen in the office due to the COVID 19 pandemic.  She has been taking the prednisone since Saturday and has less body aches, less fatigue, less head pain and feels better and her sinuses are draining.  Her appetite has improved and she is tolerating the prednisone well.\par

## 2021-05-05 NOTE — PLAN
[FreeTextEntry1] : she was advised to continue the prednisone and the bactrim, she is improving, she will follow up in 1 week via telephone or sooner prn

## 2021-05-07 ENCOUNTER — NON-APPOINTMENT (OUTPATIENT)
Age: 76
End: 2021-05-07

## 2021-05-10 ENCOUNTER — NON-APPOINTMENT (OUTPATIENT)
Age: 76
End: 2021-05-10

## 2021-05-12 ENCOUNTER — APPOINTMENT (OUTPATIENT)
Dept: FAMILY MEDICINE | Facility: CLINIC | Age: 76
End: 2021-05-12
Payer: MEDICARE

## 2021-05-12 PROCEDURE — 99441: CPT

## 2021-05-12 NOTE — HISTORY OF PRESENT ILLNESS
[Home] : at home, [unfilled] , at the time of the visit. [Medical Office: ([facility])___] : at the medical office located in  [Verbal consent obtained from patient] : the patient, [unfilled] [FreeTextEntry1] : Pt presents for a follow up on the sinus infection.   [de-identified] : The patient telephoned and requested a call back to discuss their health.  The visit was performed over the telephone as the patient was unable to be seen in the office due to the COVID 19 pandemic.  She hasn't been able to start the prednisone yet as her daughter hasn't had time to get to the pharmacy to pick it up.  She still has some sinus pressure and drainage but feels a little less tired and achy.  She lost 2 pounds as she hasn't had a good appetite.  She feels a little better today than she did yesterday.  She is tolerating the augmentin along with the bactrim\par

## 2021-05-12 NOTE — PLAN
[FreeTextEntry1] : she will start the prednisone as soon as she can get it filled and will continue the antibiotic treatment, she will try to eat more and will follow up via phone in 1 week or sooner prn

## 2021-05-19 ENCOUNTER — APPOINTMENT (OUTPATIENT)
Dept: FAMILY MEDICINE | Facility: CLINIC | Age: 76
End: 2021-05-19
Payer: MEDICARE

## 2021-05-19 VITALS — WEIGHT: 96 LBS | BODY MASS INDEX: 15.5 KG/M2

## 2021-05-19 PROCEDURE — 99441: CPT

## 2021-05-19 NOTE — PLAN
[FreeTextEntry1] : she requested a renewal of the bactrim and it was sent in, she will finish the prednisone and continue augmentin and will follow up via phone in 1 week

## 2021-05-19 NOTE — HISTORY OF PRESENT ILLNESS
[Home] : at home, [unfilled] , at the time of the visit. [Medical Office: ([facility])___] : at the medical office located in  [Verbal consent obtained from patient] : the patient, [unfilled] [FreeTextEntry1] : Pt presents for a follow up on sinusitis. [de-identified] : The patient telephoned and requested a call back to discuss their health.  The visit was performed over the telephone as the patient was unable to be seen in the office due to the COVID 19 pandemic.  She is starting to improve.  She felt well yesterday but then had body aches and fatigue last night.  Today she feels better again.  She has less sinus pain and pressure.  She is tolerating the augmentin, bactrim and is finishing up the prednisone.  She is eating better and her weight is 96 pounds.  Her BP last night was in the 130 range systolic but today is 120/60/\par

## 2021-05-26 ENCOUNTER — APPOINTMENT (OUTPATIENT)
Dept: FAMILY MEDICINE | Facility: CLINIC | Age: 76
End: 2021-05-26
Payer: MEDICARE

## 2021-05-26 PROCEDURE — 99441: CPT

## 2021-05-26 NOTE — PLAN
[FreeTextEntry1] : she will continue current treatment and is doing well off of the prednisone, she will follow up next week and will try to come into the office and will have lab testing done

## 2021-05-26 NOTE — HISTORY OF PRESENT ILLNESS
[Home] : at home, [unfilled] , at the time of the visit. [Medical Office: ([facility])___] : at the medical office located in  [Verbal consent obtained from patient] : the patient, [unfilled] [FreeTextEntry1] : Pt presents for a follow up from the sinus infection and for her blood pressure. [de-identified] : The patient telephoned and requested a call back to discuss their health.  The visit was performed over the telephone as the patient was unable to be seen in the office due to the COVID 19 pandemic.  She reports that she is continuing to improve.  She still has sinus congestion especially on the right side of her face and is blowing out bloody mucous from the right nostril but it is improving.  She has less body aches.  She is sleeping well.  Her weight is 96-97 range and her BP is 120's/60's\par

## 2021-06-01 ENCOUNTER — NON-APPOINTMENT (OUTPATIENT)
Age: 76
End: 2021-06-01

## 2021-06-03 ENCOUNTER — APPOINTMENT (OUTPATIENT)
Dept: FAMILY MEDICINE | Facility: CLINIC | Age: 76
End: 2021-06-03
Payer: MEDICARE

## 2021-06-03 PROCEDURE — 99441: CPT

## 2021-06-03 NOTE — HISTORY OF PRESENT ILLNESS
[Home] : at home, [unfilled] , at the time of the visit. [Medical Office: ([facility])___] : at the medical office located in  [Verbal consent obtained from patient] : the patient, [unfilled] [FreeTextEntry1] : The patient telephoned and requested a call back to discuss their health.  The visit was performed over the telephone as the patient was unable to be seen in the office due to the COVID 19 pandemic.\par  [de-identified] : She started the prednisone last night, she only took 1 tab and is wondering how much to take today.  She still has sinus pain and pressure, body aches, fatigue and a little dizziness.  She is eating a little better than she was over the weekend.  She lost a few pounds.  Her BP has been in the 120-130/60-70 range.

## 2021-06-03 NOTE — PLAN
[FreeTextEntry1] : she appears to be slowly improving, I will renew the augmentin and Bactrim, she will continue on the prednisone and will follow up in 1 week

## 2021-06-11 ENCOUNTER — APPOINTMENT (OUTPATIENT)
Dept: FAMILY MEDICINE | Facility: CLINIC | Age: 76
End: 2021-06-11
Payer: MEDICARE

## 2021-06-11 PROCEDURE — 99441: CPT

## 2021-06-11 NOTE — PLAN
[FreeTextEntry1] : she was advised to continue current treatment, I-STOP was checked and diazepam renewed and she will follow up in 1 week or sooner prn

## 2021-06-11 NOTE — HISTORY OF PRESENT ILLNESS
[Home] : at home, [unfilled] , at the time of the visit. [Medical Office: ([facility])___] : at the medical office located in  [Verbal consent obtained from patient] : the patient, [unfilled] [FreeTextEntry1] : Pt presents for a follow up on the sinus infection and insomnia. [de-identified] : The patient telephoned and requested a call back to discuss their health.  The visit was performed over the telephone as the patient was unable to be seen in the office due to the COVID 19 pandemic.  She is overall feeling better.  She has spent more time outdoors with the warm [place] weather and has had less sinus congestion.  She is still getting some drainage but has less body aches and more energy.  She took the last dose of prednisone today and is still on both antibiotics.  She gained 2 pounds and her BP is in the 110-130/60-70 range.  She would like to renew the diazepam which is helping her to relax and sleep.\par

## 2021-06-14 ENCOUNTER — NON-APPOINTMENT (OUTPATIENT)
Age: 76
End: 2021-06-14

## 2021-06-17 ENCOUNTER — APPOINTMENT (OUTPATIENT)
Dept: FAMILY MEDICINE | Facility: CLINIC | Age: 76
End: 2021-06-17
Payer: MEDICARE

## 2021-06-17 LAB
ALBUMIN SERPL ELPH-MCNC: 4.6 G/DL
ALP BLD-CCNC: 80 U/L
ALT SERPL-CCNC: 23 U/L
ANION GAP SERPL CALC-SCNC: 13 MMOL/L
AST SERPL-CCNC: 16 U/L
BASOPHILS # BLD AUTO: 0.02 K/UL
BASOPHILS NFR BLD AUTO: 0.3 %
BILIRUB SERPL-MCNC: 0.4 MG/DL
BUN SERPL-MCNC: 19 MG/DL
CALCIUM SERPL-MCNC: 10 MG/DL
CHLORIDE SERPL-SCNC: 107 MMOL/L
CHOLEST SERPL-MCNC: 249 MG/DL
CO2 SERPL-SCNC: 22 MMOL/L
CREAT SERPL-MCNC: 0.92 MG/DL
EOSINOPHIL # BLD AUTO: 0.19 K/UL
EOSINOPHIL NFR BLD AUTO: 2.4 %
GLUCOSE SERPL-MCNC: 98 MG/DL
HCT VFR BLD CALC: 42.5 %
HDLC SERPL-MCNC: 64 MG/DL
HGB BLD-MCNC: 13.3 G/DL
IMM GRANULOCYTES NFR BLD AUTO: 0.6 %
LDLC SERPL CALC-MCNC: 162 MG/DL
LYMPHOCYTES # BLD AUTO: 2.29 K/UL
LYMPHOCYTES NFR BLD AUTO: 29.4 %
MAN DIFF?: NORMAL
MCHC RBC-ENTMCNC: 31.1 PG
MCHC RBC-ENTMCNC: 31.3 GM/DL
MCV RBC AUTO: 99.5 FL
MONOCYTES # BLD AUTO: 0.95 K/UL
MONOCYTES NFR BLD AUTO: 12.2 %
NEUTROPHILS # BLD AUTO: 4.28 K/UL
NEUTROPHILS NFR BLD AUTO: 55.1 %
NONHDLC SERPL-MCNC: 186 MG/DL
PLATELET # BLD AUTO: 262 K/UL
POTASSIUM SERPL-SCNC: 4.5 MMOL/L
PROT SERPL-MCNC: 6.8 G/DL
RBC # BLD: 4.27 M/UL
RBC # FLD: 14.2 %
SODIUM SERPL-SCNC: 142 MMOL/L
TRIGL SERPL-MCNC: 119 MG/DL
TSH SERPL-ACNC: 4.73 UIU/ML
WBC # FLD AUTO: 7.78 K/UL

## 2021-06-17 PROCEDURE — 99442: CPT

## 2021-06-17 NOTE — HISTORY OF PRESENT ILLNESS
[Home] : at home, [unfilled] , at the time of the visit. [Medical Office: ([facility])___] : at the medical office located in  [Verbal consent obtained from patient] : the patient, [unfilled] [FreeTextEntry1] : Pt presents for a follow up on the sinus infection, weakness and the lab results. [de-identified] : The patient telephoned and requested a call back to discuss their health.  The visit was performed over the telephone as the patient was unable to be seen in the office due to the COVID 19 pandemic.  She still feels tired and a little weak and a little achy.  She eats butter but doesn't eat red meat or cheese.  She hasn't weighed herself in a few days.  She is afraid to go off of the antibiotics as she is afraid to feel worse.  Her BP is running in the 110-130/60-70 range\par

## 2021-06-17 NOTE — PLAN
[FreeTextEntry1] : she will continue current medications for now but I stress with her the importance of having the CT of the sinuses done to evaluate the need for further antibiotic treatment and if the CT is normal then a referral to ENT or an allergist may be needed, she will schedule the test, the labs were reviewed, no treatment needed at this time for the cholesterol or thyroid, she will have the cardiology office send a release form for the results, she will follow u in 1 week, she was given significant emotional support

## 2021-06-24 ENCOUNTER — APPOINTMENT (OUTPATIENT)
Dept: FAMILY MEDICINE | Facility: CLINIC | Age: 76
End: 2021-06-24
Payer: MEDICARE

## 2021-06-24 PROCEDURE — 99442: CPT

## 2021-06-24 NOTE — HISTORY OF PRESENT ILLNESS
[Home] : at home, [unfilled] , at the time of the visit. [Medical Office: ([facility])___] : at the medical office located in  [Verbal consent obtained from patient] : the patient, [unfilled] [FreeTextEntry1] : Pt presents for a follow up on her fatigue and sinus congestion. [de-identified] : The patient telephoned and requested a call back to discuss their health.  The visit was performed over the telephone as the patient was unable to be seen in the office due to the COVID 19 pandemic.  She is starting to feel a little stronger but is very nervous and upset.  She isn't eating as well as she had been and her stool is a little loose.  She lost 2 pounds.  Her BP is in the 114-120/70 range with a heart rate in the 70's.  She is due to finish the augmentin on 6/29.  She is scheduled for the CT of the sinuses on 6/30.\par

## 2021-06-24 NOTE — PLAN
[FreeTextEntry1] : she was given a lot of emotional support and she will finish the augmentin on 6/29 and stay on the bactrim while awaiting the CT report, she will continue diazepam as needed and try to increase her caloric intake

## 2021-07-01 ENCOUNTER — APPOINTMENT (OUTPATIENT)
Dept: FAMILY MEDICINE | Facility: CLINIC | Age: 76
End: 2021-07-01
Payer: MEDICARE

## 2021-07-01 PROCEDURE — 99442: CPT

## 2021-07-01 RX ORDER — PREDNISONE 10 MG/1
10 TABLET ORAL
Qty: 18 | Refills: 0 | Status: DISCONTINUED | COMMUNITY
Start: 2018-01-18 | End: 2021-07-01

## 2021-07-01 NOTE — HISTORY OF PRESENT ILLNESS
[Home] : at home, [unfilled] , at the time of the visit. [Medical Office: ([facility])___] : at the medical office located in  co-morbidities as well as early death and contributing to the patient's presentation.  on weight loss when appropriate. DVT Prophylaxis: Lovenox  Diet: General  Code Status: Full  (Advanced care planning has been discussed with patient and/or responsible family member and is reflected in the code status. Further orders associated with this have been entered if appropriate)    Disposition: Anticipate that patient will remain in the hospital for 1 to 2 days depending on further evaluation and clinical course.      Malick Wu MD [Verbal consent obtained from patient] : the patient, [unfilled] [FreeTextEntry1] : She presents for a follow up on the sinus infection and her weakness. [de-identified] : The patient telephoned and requested a call back to discuss their health.  The visit was performed over the telephone as the patient was unable to be seen in the office due to the COVID 19 pandemic.  When she first wakes up in the morning she has a gassy feeling in her stomach and feels nauseous and then has some soft stools.  She still feels somewhat weak and tired but it is a little better.  She isn't getting as much drainage from her sinuses and some pressure behind the right eye on and off.  She had the CT of the sinuses done yesterday.  Her weight today was 90 pounds.  Her BP has been in the 110-120/70 range and her pulse is in the 70-90 range.  She finished the augmentin and wants to continue the bactrim.  She was able to eat a turkey sandwich last night which is an improvement.\par

## 2021-07-01 NOTE — PLAN
[FreeTextEntry1] : I will call her with the CT report when it is available, I renewed the bactrim, she will continue to increase her caloric intake as tolerated, she will follow up in 1 week or sooner prn

## 2021-07-02 ENCOUNTER — APPOINTMENT (OUTPATIENT)
Dept: FAMILY MEDICINE | Facility: CLINIC | Age: 76
End: 2021-07-02
Payer: MEDICARE

## 2021-07-02 VITALS — TEMPERATURE: 98.7 F | HEIGHT: 65 IN | RESPIRATION RATE: 16 BRPM | BODY MASS INDEX: 14.99 KG/M2 | WEIGHT: 90 LBS

## 2021-07-02 VITALS — DIASTOLIC BLOOD PRESSURE: 70 MMHG | SYSTOLIC BLOOD PRESSURE: 136 MMHG

## 2021-07-02 DIAGNOSIS — N95.2 POSTMENOPAUSAL ATROPHIC VAGINITIS: ICD-10-CM

## 2021-07-02 DIAGNOSIS — J34.2 DEVIATED NASAL SEPTUM: ICD-10-CM

## 2021-07-02 PROCEDURE — 99072 ADDL SUPL MATRL&STAF TM PHE: CPT

## 2021-07-02 PROCEDURE — 99213 OFFICE O/P EST LOW 20 MIN: CPT | Mod: 25

## 2021-07-02 PROCEDURE — G0442 ANNUAL ALCOHOL SCREEN 15 MIN: CPT | Mod: 59

## 2021-07-02 RX ORDER — VALACYCLOVIR 1 G/1
1 TABLET, FILM COATED ORAL
Qty: 21 | Refills: 0 | Status: DISCONTINUED | COMMUNITY
Start: 2020-11-19 | End: 2021-07-02

## 2021-07-02 NOTE — HISTORY OF PRESENT ILLNESS
[FreeTextEntry8] : Last night she had itching in the vaginal area and today it felt a little painful.  She tried to look in the mirror and she thought she saw white spots.

## 2021-07-02 NOTE — PHYSICAL EXAM
[No Acute Distress] : no acute distress [Well Developed] : well developed [No Respiratory Distress] : no respiratory distress  [Coordination Grossly Intact] : coordination grossly intact [de-identified] : linear slit seen on outside of left labia minora, no vesicles seen [de-identified] : she is very anxious

## 2021-07-02 NOTE — REVIEW OF SYSTEMS
[Fatigue] : fatigue [Recent Change In Weight] : ~T recent weight change [Nasal Discharge] : nasal discharge [Postnasal Drip] : postnasal drip [Anxiety] : anxiety [Negative] : Heme/Lymph [FreeTextEntry8] : possible rash in vaginal area

## 2021-07-02 NOTE — PLAN
[FreeTextEntry1] : she was reassured that she doesn't have shingles at this time, she was advised to apply petroleum jelly to the area tid until healed, the CT report of the sinuses was reviewed with her

## 2021-07-08 ENCOUNTER — APPOINTMENT (OUTPATIENT)
Dept: FAMILY MEDICINE | Facility: CLINIC | Age: 76
End: 2021-07-08
Payer: MEDICARE

## 2021-07-08 PROCEDURE — 99441: CPT

## 2021-07-08 NOTE — PLAN
[FreeTextEntry1] : she was advised to continue current medications and to try to take in more calories, she was given emotional support and will follow up via telephone in 1 week

## 2021-07-08 NOTE — HISTORY OF PRESENT ILLNESS
[Home] : at home, [unfilled] , at the time of the visit. [Medical Office: ([facility])___] : at the medical office located in  [Verbal consent obtained from patient] : the patient, [unfilled] [FreeTextEntry1] : Pt presents for a follow up on her weakness and sinus symptoms [de-identified] : The patient telephoned and requested a call back to discuss their health.  The visit was performed over the telephone as the patient was unable to be seen in the office due to the COVID 19 pandemic.  She has had slight improvement in the overall weak feeling.  She is able to eat a little more and her weight is between 91-92 pounds.  She still has some pressure in the right side of her face.  The vaginal sore has healed.\par

## 2021-07-14 ENCOUNTER — APPOINTMENT (OUTPATIENT)
Dept: FAMILY MEDICINE | Facility: CLINIC | Age: 76
End: 2021-07-14
Payer: MEDICARE

## 2021-07-14 PROCEDURE — 99441: CPT

## 2021-07-14 NOTE — PLAN
[FreeTextEntry1] : she was advised to continue to increase caloric intake, she is going to the dentist to rule out a tooth infection, she will continue current treatment and follow up via phone in 1 week

## 2021-07-14 NOTE — HISTORY OF PRESENT ILLNESS
[Home] : at home, [unfilled] , at the time of the visit. [Medical Office: ([facility])___] : at the medical office located in  [Verbal consent obtained from patient] : the patient, [unfilled] [FreeTextEntry1] : Pt presents for a follow up on the sinus infection and weakness. [de-identified] : The patient telephoned and requested a call back to discuss their health.  The visit was performed over the telephone as the patient was unable to be seen in the office due to the COVID 19 pandemic.  She reports that she is feeling a little more energetic and she has some improvement in her appetite and the sinus pressure.  She is eating a little better and her weight is the same.\par

## 2021-07-16 ENCOUNTER — NON-APPOINTMENT (OUTPATIENT)
Age: 76
End: 2021-07-16

## 2021-07-23 ENCOUNTER — APPOINTMENT (OUTPATIENT)
Dept: FAMILY MEDICINE | Facility: CLINIC | Age: 76
End: 2021-07-23
Payer: MEDICARE

## 2021-07-23 PROCEDURE — 99441: CPT

## 2021-07-23 NOTE — HISTORY OF PRESENT ILLNESS
[Home] : at home, [unfilled] , at the time of the visit. [Medical Office: ([facility])___] : at the medical office located in  [Verbal consent obtained from patient] : the patient, [unfilled] [de-identified] : The patient telephoned and requested a call back to discuss their health.  The visit was performed over the telephone as the patient was unable to be seen in the office due to the COVID 19 pandemic.  She reports that she is starting to feel better now.  She is feeling a little more energetic.  She is tolerating the antibiotics.  She was very constipated and took extra Miralax then had diarrhea and a decreased appetite and her weight dropped to 88 pounds.

## 2021-07-23 NOTE — PLAN
[FreeTextEntry1] : I renewed the bactrim and she will continue the augmentin, she will try to eat more in an effort to regain some weight and will follow up via phone in 1 week or sooner prn

## 2021-07-28 ENCOUNTER — APPOINTMENT (OUTPATIENT)
Dept: FAMILY MEDICINE | Facility: CLINIC | Age: 76
End: 2021-07-28
Payer: MEDICARE

## 2021-07-28 PROCEDURE — 99441: CPT

## 2021-07-28 NOTE — PLAN
[FreeTextEntry1] : she is continuing to improve and will continue current treatment for the sinusitis, although the CT didn't show any active infection she is clinically improving with the antibiotic treatment and will follow up in 1 week

## 2021-07-28 NOTE — HISTORY OF PRESENT ILLNESS
[Home] : at home, [unfilled] , at the time of the visit. [Medical Office: ([facility])___] : at the medical office located in  [Verbal consent obtained from patient] : the patient, [unfilled] [FreeTextEntry1] : Pt presents for a follow up on the sinus infection and weakness. [de-identified] : The patient telephoned and requested a call back to discuss their health.  The visit was performed over the telephone as the patient was unable to be seen in the office due to the COVID 19 pandemic.  She is slowly improving.  She is feeling stronger.  She still has some pain and pressure in her head.  She is eating better and her weight is back up to 90 pounds.  Her BP is runnung in the 100-120/60-70 range.  She is sleeping well with the diazepam and is tolerating the antibiotics without side effects.\par

## 2021-08-04 ENCOUNTER — APPOINTMENT (OUTPATIENT)
Dept: FAMILY MEDICINE | Facility: CLINIC | Age: 76
End: 2021-08-04
Payer: MEDICARE

## 2021-08-04 PROCEDURE — 99441: CPT

## 2021-08-04 NOTE — PLAN
[FreeTextEntry1] : she was advised to take prednisone as directed and to continue other medications without change, if her symptoms persist or recur further lab testing or allergy/immunology consultation will be obtained and she will follow up in 1 week or sooner prn

## 2021-08-04 NOTE — HISTORY OF PRESENT ILLNESS
[Home] : at home, [unfilled] , at the time of the visit. [Medical Office: ([facility])___] : at the medical office located in  [Verbal consent obtained from patient] : the patient, [unfilled] [FreeTextEntry1] : Pt presents for a follow up on her weakness and sinus symptoms [de-identified] : The patient telephoned and requested a call back to discuss their health.  The visit was performed over the telephone as the patient was unable to be seen in the office due to the COVID 19 pandemic.  She still has pain in the sinuses especially on the right side.  She is feeling more tired and achy and has the chills.  Her temperature today is ranging from 96.7-98.6.  She is eating fairly well and her weight is 91 pounds.  She isn't coughing.  She has night sweats but she states that this has been occurring for years.  She is taking both antibiotics.\par

## 2021-08-11 ENCOUNTER — APPOINTMENT (OUTPATIENT)
Dept: FAMILY MEDICINE | Facility: CLINIC | Age: 76
End: 2021-08-11
Payer: MEDICARE

## 2021-08-11 VITALS — BODY MASS INDEX: 15.31 KG/M2 | WEIGHT: 92 LBS

## 2021-08-11 PROCEDURE — 99442: CPT

## 2021-08-11 NOTE — HISTORY OF PRESENT ILLNESS
[Home] : at home, [unfilled] , at the time of the visit. [Medical Office: ([facility])___] : at the medical office located in  [Verbal consent obtained from patient] : the patient, [unfilled] [FreeTextEntry1] : She presents for a follow up on her weakness. [de-identified] : The patient telephoned and requested a call back to discuss their health.  The visit was performed over the telephone as the patient was unable to be seen in the office due to the COVID 19 pandemic. She continues to feel weak and has chills.  She still has the sweats at night.  Her appetite is OK and her weight is 92 pounds.  She has been a little short of breath on and off.  She is tolerating the prednisone.\par

## 2021-08-11 NOTE — PLAN
[FreeTextEntry1] : she was advised to continue current medications and to push herself to eat, she was given significant emotional support, labs were ordered to further evaluate her symptoms and will be drawn at home and she will follow up in 1 week or sooner prn

## 2021-08-12 ENCOUNTER — LABORATORY RESULT (OUTPATIENT)
Age: 76
End: 2021-08-12

## 2021-08-14 LAB
ALBUMIN SERPL ELPH-MCNC: 4.3 G/DL
ALP BLD-CCNC: 67 U/L
ALT SERPL-CCNC: 43 U/L
ANA SER IF-ACNC: NEGATIVE
ANION GAP SERPL CALC-SCNC: 13 MMOL/L
AST SERPL-CCNC: 18 U/L
B BURGDOR IGG+IGM SER QL IB: NORMAL
BASOPHILS # BLD AUTO: 0.01 K/UL
BASOPHILS NFR BLD AUTO: 0.1 %
BILIRUB SERPL-MCNC: 0.2 MG/DL
BUN SERPL-MCNC: 19 MG/DL
CALCIUM SERPL-MCNC: 9.7 MG/DL
CHLORIDE SERPL-SCNC: 102 MMOL/L
CK SERPL-CCNC: 38 U/L
CO2 SERPL-SCNC: 22 MMOL/L
CREAT SERPL-MCNC: 0.78 MG/DL
EOSINOPHIL # BLD AUTO: 0.02 K/UL
EOSINOPHIL NFR BLD AUTO: 0.3 %
FERRITIN SERPL-MCNC: 57 NG/ML
FOLATE SERPL-MCNC: 5.2 NG/ML
GLUCOSE SERPL-MCNC: 118 MG/DL
HCT VFR BLD CALC: 37.1 %
HGB BLD-MCNC: 11.8 G/DL
IMM GRANULOCYTES NFR BLD AUTO: 1 %
LYMPHOCYTES # BLD AUTO: 1.44 K/UL
LYMPHOCYTES NFR BLD AUTO: 18.6 %
MAN DIFF?: NORMAL
MCHC RBC-ENTMCNC: 31.8 GM/DL
MCHC RBC-ENTMCNC: 32.1 PG
MCV RBC AUTO: 100.8 FL
MONOCYTES # BLD AUTO: 0.99 K/UL
MONOCYTES NFR BLD AUTO: 12.8 %
NEUTROPHILS # BLD AUTO: 5.22 K/UL
NEUTROPHILS NFR BLD AUTO: 67.2 %
PLATELET # BLD AUTO: 267 K/UL
POTASSIUM SERPL-SCNC: 4.3 MMOL/L
PROT SERPL-MCNC: 6.3 G/DL
RBC # BLD: 3.68 M/UL
RBC # FLD: 13.6 %
RHEUMATOID FACT SER QL: <10 IU/ML
SODIUM SERPL-SCNC: 137 MMOL/L
TSH SERPL-ACNC: 2.59 UIU/ML
VIT B12 SERPL-MCNC: 255 PG/ML
WBC # FLD AUTO: 7.76 K/UL

## 2021-08-15 LAB — DSDNA AB SER-ACNC: <12 IU/ML

## 2021-08-18 ENCOUNTER — APPOINTMENT (OUTPATIENT)
Dept: FAMILY MEDICINE | Facility: CLINIC | Age: 76
End: 2021-08-18
Payer: MEDICARE

## 2021-08-18 PROCEDURE — 99441: CPT

## 2021-08-18 NOTE — HISTORY OF PRESENT ILLNESS
[Home] : at home, [unfilled] , at the time of the visit. [Medical Office: ([facility])___] : at the medical office located in  [Verbal consent obtained from patient] : the patient, [unfilled] [FreeTextEntry1] : Pt presents for a follow up of weakness and sinus infection. [de-identified] : The patient telephoned and requested a call back to discuss their health.  The visit was performed over the telephone as the patient was unable to be seen in the office due to the COVID 19 pandemic.  She feels that she is slowly improving, her leg weakness is better and her overall weakness is better.  She still has some sinus congestion.  He weight is still 92 pounds.  She would like to renew diazepam that she takes mostly for sleep and sometimes takes 1 during the day.\par

## 2021-08-25 ENCOUNTER — APPOINTMENT (OUTPATIENT)
Dept: FAMILY MEDICINE | Facility: CLINIC | Age: 76
End: 2021-08-25
Payer: MEDICARE

## 2021-08-25 VITALS
BODY MASS INDEX: 15.33 KG/M2 | HEIGHT: 65 IN | WEIGHT: 92 LBS | HEART RATE: 98 BPM | RESPIRATION RATE: 16 BRPM | OXYGEN SATURATION: 98 % | SYSTOLIC BLOOD PRESSURE: 120 MMHG | DIASTOLIC BLOOD PRESSURE: 78 MMHG

## 2021-08-25 PROCEDURE — 99213 OFFICE O/P EST LOW 20 MIN: CPT

## 2021-08-25 RX ORDER — AMOXICILLIN AND CLAVULANATE POTASSIUM 875; 125 MG/1; MG/1
875-125 TABLET, COATED ORAL
Qty: 28 | Refills: 1 | Status: DISCONTINUED | COMMUNITY
Start: 2021-07-16 | End: 2021-08-25

## 2021-08-25 NOTE — PHYSICAL EXAM
[No Acute Distress] : no acute distress [Normal Sclera/Conjunctiva] : normal sclera/conjunctiva [Well Developed] : well developed [Normal Oropharynx] : the oropharynx was normal [No Lymphadenopathy] : no lymphadenopathy [Supple] : supple [No Respiratory Distress] : no respiratory distress  [Coordination Grossly Intact] : coordination grossly intact [de-identified] : right TM dull, decreased light reflex, left normal [de-identified] : multiple small, flat papules in bilateral inguinal area [de-identified] : she is anxious

## 2021-08-25 NOTE — PLAN
[FreeTextEntry1] : she was advised to apply the steroid cream twice a day, she will finish the current course of augmentin then stop and continue bactrim, she was given a lot of emotional support, she is a little depressed but doesn't want to start an antidepressant at this time, she will follow up in 1 week via telephone

## 2021-08-25 NOTE — HISTORY OF PRESENT ILLNESS
[FreeTextEntry1] : patient presents for follow-up on rash in her groin area [de-identified] : She had an itchy rash in the left groin and she applied vaseline and caladryl.  Her legs still feel heavy on and off.  The right ear still feels a little clogged.  Her overall fatigue and weakness of slowly improving.  She sweats a lot at night and has been doing so since the mid 1980's.

## 2021-08-25 NOTE — REVIEW OF SYSTEMS
[Fever] : no fever [Fatigue] : fatigue [Recent Change In Weight] : ~T no recent weight change [Earache] : earache [Nasal Discharge] : nasal discharge [Postnasal Drip] : postnasal drip [Cough] : no cough [Muscle Weakness] : muscle weakness [Muscle Pain] : muscle pain [Itching] : Itching [Skin Rash] : skin rash [Suicidal] : not suicidal [Insomnia] : insomnia [Anxiety] : anxiety [Depression] : depression [Negative] : Heme/Lymph

## 2021-09-01 ENCOUNTER — APPOINTMENT (OUTPATIENT)
Dept: FAMILY MEDICINE | Facility: CLINIC | Age: 76
End: 2021-09-01
Payer: MEDICARE

## 2021-09-01 PROCEDURE — 99442: CPT

## 2021-09-01 NOTE — HISTORY OF PRESENT ILLNESS
[Home] : at home, [unfilled] , at the time of the visit. [Medical Office: ([facility])___] : at the medical office located in  [Verbal consent obtained from patient] : the patient, [unfilled] [FreeTextEntry1] : Pt presents for a follow up on weight loss, rash, fatigue [de-identified] : The patient telephoned and requested a call back to discuss their health.  The visit was performed over the telephone as the patient was unable to be seen in the office due to the COVID 19 pandemic.  She is feeling a little stronger and going for short walks in her neighborhood.  She still has the leg aches on and off and some sinus congestion.  She hasn't been eating well and her weight is down to 90 pounds.  The rash is improving, the spots are now pink and not as itchy.  Her BP is in the 110/70 range.\par

## 2021-09-01 NOTE — PLAN
[FreeTextEntry1] : she was advised to continue to apply the hydrocortisone cream bid, to continue all medications without change and to try to increase caloric intake, will follow up in 1 week or sooner prn

## 2021-09-08 ENCOUNTER — APPOINTMENT (OUTPATIENT)
Dept: FAMILY MEDICINE | Facility: CLINIC | Age: 76
End: 2021-09-08
Payer: MEDICARE

## 2021-09-08 VITALS — WEIGHT: 92 LBS | BODY MASS INDEX: 15.31 KG/M2

## 2021-09-08 PROCEDURE — 99441: CPT

## 2021-09-08 RX ORDER — PREDNISONE 10 MG/1
10 TABLET ORAL
Qty: 20 | Refills: 0 | Status: DISCONTINUED | COMMUNITY
Start: 2021-08-04 | End: 2021-09-08

## 2021-09-08 NOTE — CURRENT MEDS
[Takes medication as prescribed] : takes Event Note [None] : Patient does not have any barriers to medication adherence [Yes] : Reviewed medication list for presence of high-risk medications. [Benzodiazepines] : benzodiazepines

## 2021-09-08 NOTE — HISTORY OF PRESENT ILLNESS
[Home] : at home, [unfilled] , at the time of the visit. [Medical Office: ([facility])___] : at the medical office located in  [Verbal consent obtained from patient] : the patient, [unfilled] [FreeTextEntry1] : Pt presents for a follow up on her weakness and sinus infection. [de-identified] : The patient telephoned and requested a call back to discuss their health.  The visit was performed over the telephone as the patient was unable to be seen in the office due to the COVID 19 pandemic.  She is starting to feel stronger and has been eating a little better and her weight is now in the 92-93 range.  Her BP has been in the 110/60 range with a heart rate of 60-70. She is having a little less sinus congestion.  She is getting out of the house a little more\par

## 2021-09-08 NOTE — PLAN
[FreeTextEntry1] : she was advised to continue current treatment and to continue to increase caloric intake, she is slowly improving and will follow up in 1 week or sooner prn

## 2021-09-13 ENCOUNTER — APPOINTMENT (OUTPATIENT)
Dept: FAMILY MEDICINE | Facility: CLINIC | Age: 76
End: 2021-09-13
Payer: MEDICARE

## 2021-09-13 PROCEDURE — 99441: CPT

## 2021-09-20 ENCOUNTER — APPOINTMENT (OUTPATIENT)
Dept: FAMILY MEDICINE | Facility: CLINIC | Age: 76
End: 2021-09-20
Payer: MEDICARE

## 2021-09-20 PROCEDURE — 99441: CPT

## 2021-09-20 NOTE — PLAN
[FreeTextEntry1] : I-STOP was checked and diazepam and bactrim renewed, she will continue to monitor her BP and weight and was given emotional support and will follow up in 1 week or sooner prn

## 2021-09-20 NOTE — HISTORY OF PRESENT ILLNESS
[Home] : at home, [unfilled] , at the time of the visit. [Medical Office: ([facility])___] : at the medical office located in  [Verbal consent obtained from patient] : the patient, [unfilled] [FreeTextEntry1] : Pt presents for a recheck on the weakness and headaches. [de-identified] : The patient telephoned and requested a call back to discuss their health.  The visit was performed over the telephone as the patient was unable to be seen in the office due to the COVID 19 pandemic.  She felt very well on Friday and ran some errands but the next day she felt a little weak, it still comes and goes.  This morning her BP was 152/85, she took a lisinopril and it came down to 129/80 and then 112/68.  Her weight is still 91.  She would like to renew diazepam and bactrim\par

## 2021-09-24 ENCOUNTER — NON-APPOINTMENT (OUTPATIENT)
Age: 76
End: 2021-09-24

## 2021-09-28 ENCOUNTER — APPOINTMENT (OUTPATIENT)
Dept: FAMILY MEDICINE | Facility: CLINIC | Age: 76
End: 2021-09-28
Payer: MEDICARE

## 2021-09-28 PROCEDURE — 99442: CPT

## 2021-09-28 NOTE — HISTORY OF PRESENT ILLNESS
[Home] : at home, [unfilled] , at the time of the visit. [Medical Office: ([facility])___] : at the medical office located in  [Verbal consent obtained from patient] : the patient, [unfilled] [FreeTextEntry1] : Pt presents for a follow up on the sinus pain and fatigue. [de-identified] : The patient telephoned and requested a call back to discuss their health.  The visit was performed over the telephone as the patient was unable to be seen in the office due to the COVID 19 pandemic.  She is still feeling somewhat weak and has some body aches in her legs on and off.  Her BP is running in the 104-130/60-70 range and her heart rate is in the 70 range.  Her weight is 89 pounds.  She is taking black cohosh and last night the night sweats were a little better.  She had to stop the gummy multivitamins as it bothered her stomach.  \par

## 2021-09-28 NOTE — ADDENDUM
[FreeTextEntry1] : She called back and decided to start the mirtazapine, she will start with just 1/2 tab daily until we have our next appointment

## 2021-09-28 NOTE — PLAN
[FreeTextEntry1] : she was advised that her symptoms may be partially due to anxiety and I strongly advised her to consider starting an antidepressant but she declined, she asked for a renewal of augmentin and I renewed it although I also told her she can't stay on antibiotics long term, she was given considerable emotional support throughout the visit and will follow up in 1 week or sooner prn

## 2021-10-05 ENCOUNTER — APPOINTMENT (OUTPATIENT)
Dept: FAMILY MEDICINE | Facility: CLINIC | Age: 76
End: 2021-10-05
Payer: MEDICARE

## 2021-10-05 PROCEDURE — 99442: CPT

## 2021-10-05 RX ORDER — AMOXICILLIN AND CLAVULANATE POTASSIUM 875; 125 MG/1; MG/1
875-125 TABLET, COATED ORAL TWICE DAILY
Qty: 28 | Refills: 1 | Status: DISCONTINUED | COMMUNITY
Start: 2021-01-13 | End: 2021-10-05

## 2021-10-05 NOTE — HISTORY OF PRESENT ILLNESS
[Home] : at home, [unfilled] , at the time of the visit. [Medical Office: ([facility])___] : at the medical office located in  [Verbal consent obtained from patient] : the patient, [unfilled] [FreeTextEntry1] : Pt presents for follow up [de-identified] : The patient telephoned and requested a call back to discuss their health.  The visit was performed over the telephone as the patient was unable to be seen in the office due to the COVID 19 pandemic.  For the past 4 days she feels a little stronger and has less muscle aches and is asking if she should stop the augmentin.  She started the mirtazapine on 10/2 and is tolerating it so far.  She hasn't had any constipation and today had loose stools.  She is still on the black cohosh, she is still getting the night sweats but the degree of sweating has improved somewhat.  Her weight is 89-90 pounds.  Her BP is in the 115-120/70 range and her her heart rate is in the 70 range.\par

## 2021-10-05 NOTE — PLAN
[FreeTextEntry1] : she was advised to stop the augmentin and to continue other medications without change, to continue to try to eat regularly, she was given significant emotional support and will follow up via telephone next week or sooner prn

## 2021-10-06 ENCOUNTER — NON-APPOINTMENT (OUTPATIENT)
Age: 76
End: 2021-10-06

## 2021-10-13 ENCOUNTER — APPOINTMENT (OUTPATIENT)
Dept: FAMILY MEDICINE | Facility: CLINIC | Age: 76
End: 2021-10-13
Payer: MEDICARE

## 2021-10-13 PROCEDURE — 99441: CPT

## 2021-10-13 NOTE — HISTORY OF PRESENT ILLNESS
[Home] : at home, [unfilled] , at the time of the visit. [Medical Office: ([facility])___] : at the medical office located in  [Verbal consent obtained from patient] : the patient, [unfilled] [FreeTextEntry1] : Pt presents for a follow up of abdominal pain. [de-identified] : The patient telephoned and requested a call back to discuss their health.  The visit was performed over the telephone as the patient was unable to be seen in the office due to the COVID 19 pandemic.  She didn't go to the ER for the abdominal pain.  It improved but she still has pain on and off.  Her stool is looser than usual and she hasn't been constipated.  She stopped the black cohosh and mirtazapine last week.  Her weight is 89-90 pounds.  Her weakness is the same.  The sinus headache is still coming and going.  She has been able to eat a little better this week.  Her BP has been running in the 110-130/70 range and her heart range is 60-90\par

## 2021-10-13 NOTE — PLAN
[FreeTextEntry1] : she was advised to try to continue to eat more and to follow a bland diet, she was advised to schedule a GI consultation, she will stay off of mirtazapine and black cohosh for now and will follow up in 1 week via telephone or sooner prn

## 2021-10-20 ENCOUNTER — APPOINTMENT (OUTPATIENT)
Dept: FAMILY MEDICINE | Facility: CLINIC | Age: 76
End: 2021-10-20
Payer: MEDICARE

## 2021-10-20 PROCEDURE — 99441: CPT

## 2021-10-20 NOTE — HISTORY OF PRESENT ILLNESS
[Home] : at home, [unfilled] , at the time of the visit. [Medical Office: ([facility])___] : at the medical office located in  [Verbal consent obtained from patient] : the patient, [unfilled] [FreeTextEntry1] : Pt presents for a follow up on her abdominal symptoms and sinus infection. [de-identified] : The patient telephoned and requested a call back to discuss their health.  The visit was performed over the telephone as the patient was unable to be seen in the office due to the COVID 19 pandemic.  She was taking Miralax for the constipation as she was having loose stool with some urgency but stopped it 2 days ago and feels a little better. Her weight is still 89 pounds.  She hasn't had a headache.  She still has the night sweats on and off and the weak feeling is a little better.  She stayed off of the mirtazapine.  She wasn't able to see the GI specialist yet.  She is still off of the black cohosh.  She would like to renew the bactrim and diazepam today.  Her BP has been running in the 120/70 range and her heart rate is 60-90\par

## 2021-10-20 NOTE — PLAN
[FreeTextEntry1] : I renewed bactrim, I-STOP was checked and diazepam renewed, she will stay off of the mirtazapine and black cohosh and schedule the GI appointment and follow up with me in 1 week or sooner prn

## 2021-10-27 ENCOUNTER — APPOINTMENT (OUTPATIENT)
Dept: FAMILY MEDICINE | Facility: CLINIC | Age: 76
End: 2021-10-27
Payer: MEDICARE

## 2021-10-27 PROCEDURE — 99441: CPT

## 2021-10-27 NOTE — PLAN
[FreeTextEntry1] : she is slowly improving and will continue current treatment and she will follow up in 1 week via telephone or sooner if symptoms worsen

## 2021-10-27 NOTE — HISTORY OF PRESENT ILLNESS
[Home] : at home, [unfilled] , at the time of the visit. [Medical Office: ([facility])___] : at the medical office located in  [Verbal consent obtained from patient] : the patient, [unfilled] [FreeTextEntry1] : Pt presents for a follow up on GI symptoms and weakness [de-identified] : The patient telephoned and requested a call back to discuss their health.  The visit was performed over the telephone as the patient was unable to be seen in the office due to the COVID 19 pandemic.  She reports that she is having more regular bowel movements and has been able to eat better.  The abdominal pain is better but she is still nauseous on and off.  Her weight is still 89 pounds.  The night sweats are still there but she has had those for many years.  She is otherwise sleeping well with the diazepam.  She has pain in the right side of her face on and off but it doesn't last for long.  Her BP has been in the 107-128/60-70 range and her pulse is in the 70's.  \par

## 2021-10-28 ENCOUNTER — NON-APPOINTMENT (OUTPATIENT)
Age: 76
End: 2021-10-28

## 2021-11-05 ENCOUNTER — APPOINTMENT (OUTPATIENT)
Dept: FAMILY MEDICINE | Facility: CLINIC | Age: 76
End: 2021-11-05
Payer: MEDICARE

## 2021-11-05 PROCEDURE — 99441: CPT

## 2021-11-05 NOTE — HISTORY OF PRESENT ILLNESS
[Home] : at home, [unfilled] , at the time of the visit. [Other Location: e.g. Home (Enter Location, City,State)___] : at [unfilled] [Verbal consent obtained from patient] : the patient, [unfilled] [FreeTextEntry1] : She presents for a follow up on the urinary tract infection, her abdominal pain and her weakness [de-identified] : The patient telephoned and requested a call back to discuss their health.  The visit was performed over the telephone as the patient was unable to be seen in the office due to the COVID 19 pandemic.  She has less pain in her bladder area and is tolerating the cefdinir.  She tried the mirtazapine, 1/2 tab at night, but then stopped it due to an upset stomach.  She has had some loose stools but that has \par improved.  Her weight is still 89 pounds.  Her BP has been in the 110-130/70 range.  The night sweats have improved

## 2021-11-05 NOTE — PLAN
[FreeTextEntry1] : her condition is slowly improving and she will continue current treatment and follow up in 5 days or sooner prn

## 2021-11-10 ENCOUNTER — APPOINTMENT (OUTPATIENT)
Dept: FAMILY MEDICINE | Facility: CLINIC | Age: 76
End: 2021-11-10
Payer: MEDICARE

## 2021-11-10 PROCEDURE — 99441: CPT

## 2021-11-10 NOTE — HISTORY OF PRESENT ILLNESS
[Home] : at home, [unfilled] , at the time of the visit. [Medical Office: ([facility])___] : at the medical office located in  [Verbal consent obtained from patient] : the patient, [unfilled] [FreeTextEntry1] : Pt presents for a follow up on her urinary tract infection and the abdominal pain and loose stools. [de-identified] : The patient telephoned and requested a call back to discuss their health.  The visit was performed over the telephone as the patient was unable to be seen in the office due to the COVID 19 pandemic.  She is still having some pain in her back and some gas pains on and off.  She has had some loose stools but not for the past 3 days and hasn't been on the miralax.  She still has some discomfort in the bladder area.  She has had headaches on and off but they aren't severe.  \par

## 2021-11-10 NOTE — PLAN
[FreeTextEntry1] : she will continue on her antibiotic treatment and the cefdinir was renewed, she will schedule the GI appointment as previously advised and will follow up in 1 week or sooner prn

## 2021-11-17 ENCOUNTER — APPOINTMENT (OUTPATIENT)
Dept: FAMILY MEDICINE | Facility: CLINIC | Age: 76
End: 2021-11-17
Payer: MEDICARE

## 2021-11-17 PROCEDURE — 99441: CPT

## 2021-11-17 NOTE — HISTORY OF PRESENT ILLNESS
[Home] : at home, [unfilled] , at the time of the visit. [Medical Office: ([facility])___] : at the medical office located in  [Verbal consent obtained from patient] : the patient, [unfilled] [FreeTextEntry1] : Pt presents for a follow up on her abdominal pain and loose stools. [de-identified] : The patient telephoned and requested a call back to discuss their health.  The visit was performed over the telephone as the patient was unable to be seen in the office due to the COVID 19 pandemic.  She reports that the urinary symptoms have improved.  The diarrhea resolved and now she is a little constipated again, she is taking fiber and miralax.  She has had minimal abdominal gas pains and hasn't called for the GI appointment.  She had a slight sinus headache last night but it feels better today.  Her weight is in the 88-89 range.  Her BP is in the 110-120/60-70 range with a pulse of 60-70.  She would like to renew bactrim in case her sinus infection worsens over the next few weeks.  \par

## 2021-11-17 NOTE — PLAN
[FreeTextEntry1] : bactrim was renewed, she was encouraged to increase caloric intake as she is able to tolerate it, no medication changes are needed and a 2 week recheck advised

## 2021-11-23 ENCOUNTER — NON-APPOINTMENT (OUTPATIENT)
Age: 76
End: 2021-11-23

## 2021-11-23 RX ORDER — CEFDINIR 300 MG/1
300 CAPSULE ORAL
Qty: 28 | Refills: 1 | Status: DISCONTINUED | COMMUNITY
Start: 2021-10-28 | End: 2021-11-23

## 2021-12-01 ENCOUNTER — APPOINTMENT (OUTPATIENT)
Dept: FAMILY MEDICINE | Facility: CLINIC | Age: 76
End: 2021-12-01
Payer: MEDICARE

## 2021-12-01 PROCEDURE — 99442: CPT

## 2021-12-01 RX ORDER — BEPOTASTINE BESILATE 15 MG/ML
1.5 SOLUTION/ DROPS OPHTHALMIC DAILY
Qty: 1 | Refills: 3 | Status: DISCONTINUED | COMMUNITY
Start: 2019-10-10 | End: 2021-12-01

## 2021-12-01 RX ORDER — HYDROCORTISONE 25 MG/G
2.5 CREAM TOPICAL
Qty: 1 | Refills: 2 | Status: DISCONTINUED | COMMUNITY
Start: 2021-08-25 | End: 2021-12-01

## 2021-12-01 RX ORDER — OMEPRAZOLE 40 MG/1
40 CAPSULE, DELAYED RELEASE ORAL
Qty: 180 | Refills: 3 | Status: DISCONTINUED | COMMUNITY
End: 2021-12-01

## 2021-12-01 RX ORDER — OMEPRAZOLE 40 MG/1
40 CAPSULE, DELAYED RELEASE ORAL
Refills: 0 | Status: DISCONTINUED | COMMUNITY
End: 2021-12-01

## 2021-12-01 RX ORDER — MIRTAZAPINE 7.5 MG/1
7.5 TABLET, FILM COATED ORAL
Qty: 30 | Refills: 3 | Status: DISCONTINUED | COMMUNITY
Start: 2021-09-28 | End: 2021-12-01

## 2021-12-01 RX ORDER — MOMETASONE FUROATE 1 MG/G
0.1 CREAM TOPICAL
Qty: 1 | Refills: 3 | Status: DISCONTINUED | COMMUNITY
Start: 2017-05-09 | End: 2021-12-01

## 2021-12-01 NOTE — HISTORY OF PRESENT ILLNESS
[Home] : at home, [unfilled] , at the time of the visit. [Medical Office: ([facility])___] : at the medical office located in  [Verbal consent obtained from patient] : the patient, [unfilled] [FreeTextEntry1] : Pt presents for a follow up on the abdominal pain. [de-identified] : The patient telephoned and requested a call back to discuss their health.  The visit was performed over the telephone as the patient was unable to be seen in the office due to the COVID 19 pandemic.  She was seen at the ER on 11/24 and the testing looked good.  She spoke with the nurse at Dr Gregg's office and they advised her to stop taking all of the GI medications and to take Mylanta as needed.  They also advised her to stop as many of her medications as she can.  She stopped the melatonin and the mirtazapine and stopped the omeprazole.  She wasn't able to eat for a while and her weight is down to 95 pounds.  She had a good BM last night and this morning.  Her BP has been running in the 120-130/70's with a heart rate in the 70's.  She is scheduled to see Dr Conklin next Wednesday.  She is taking the diazepam for sleep.\par

## 2021-12-01 NOTE — PLAN
[FreeTextEntry1] : she will try to minimize her use of the bactrim, I-STOP was checked and medications renewed, the ER report including all testing was reviewed, she will follow up in 1 week or sooner prn

## 2021-12-03 ENCOUNTER — NON-APPOINTMENT (OUTPATIENT)
Age: 76
End: 2021-12-03

## 2021-12-07 ENCOUNTER — APPOINTMENT (OUTPATIENT)
Dept: FAMILY MEDICINE | Facility: CLINIC | Age: 76
End: 2021-12-07
Payer: MEDICARE

## 2021-12-07 PROCEDURE — 99442: CPT

## 2021-12-07 NOTE — HISTORY OF PRESENT ILLNESS
[Home] : at home, [unfilled] , at the time of the visit. [Medical Office: ([facility])___] : at the medical office located in  [Verbal consent obtained from patient] : the patient, [unfilled] [FreeTextEntry1] : Pt presents for a follow up on her anxiety and her sinus congestion. [de-identified] : The patient telephoned and requested a call back to discuss their health.  The visit was performed over the telephone as the patient was unable to be seen in the office due to the COVID 19 pandemic.  She ate something this morning and she had abdominal cramping.  She is still having constipation alternating with loose stools.  She takes the miralax, 1/2 capful, daily when constipated.  She is down to 83 pounds.  She feels weak.  Her BP has been running in the 120-130/70 range with a pulse of 60-90.  She is scheduled to see Dr Conklin tomorrow.  She took one Bactrim nightly for the past 2 nights.  \par

## 2021-12-07 NOTE — PLAN
[FreeTextEntry1] : she was advised to strongly consider going back on the mirtazapine but is refusing to do so, she was advised to increase caloric intake but feels that she can't eat today, she will follow up with Dr Conklin tomorrow as scheduled, the ER results were reviewed, she was given significant emotional support and will follow up next week or sooner prn

## 2021-12-09 ENCOUNTER — TRANSCRIPTION ENCOUNTER (OUTPATIENT)
Age: 76
End: 2021-12-09

## 2021-12-10 ENCOUNTER — TRANSCRIPTION ENCOUNTER (OUTPATIENT)
Age: 76
End: 2021-12-10

## 2021-12-10 ENCOUNTER — NON-APPOINTMENT (OUTPATIENT)
Age: 76
End: 2021-12-10

## 2021-12-10 RX ORDER — SULFAMETHOXAZOLE AND TRIMETHOPRIM 800; 160 MG/1; MG/1
800-160 TABLET ORAL TWICE DAILY
Qty: 28 | Refills: 1 | Status: DISCONTINUED | COMMUNITY
End: 2021-12-10

## 2021-12-10 RX ORDER — LORATADINE 10 MG
17 TABLET,DISINTEGRATING ORAL
Refills: 0 | Status: ACTIVE | COMMUNITY

## 2021-12-13 ENCOUNTER — NON-APPOINTMENT (OUTPATIENT)
Age: 76
End: 2021-12-13

## 2021-12-14 ENCOUNTER — APPOINTMENT (OUTPATIENT)
Dept: FAMILY MEDICINE | Facility: CLINIC | Age: 76
End: 2021-12-14
Payer: MEDICARE

## 2021-12-14 PROCEDURE — 99442: CPT

## 2021-12-14 NOTE — PLAN
[FreeTextEntry1] : she was advised not to take less than 1 capful of Miralax unless she is having watery stool then she can decrease it to 1/2 capful, no medication changes were made, most of the visit was giving her nutritional advice and emotional support and she will follow up in 1 week or sooner prn, the GI notes were reviewed

## 2021-12-14 NOTE — HISTORY OF PRESENT ILLNESS
[Home] : at home, [unfilled] , at the time of the visit. [Medical Office: ([facility])___] : at the medical office located in  [Verbal consent obtained from patient] : the patient, [unfilled] [FreeTextEntry1] : Pt presents for a follow up on abdominal pain. [de-identified] : The patient telephoned and requested a call back to discuss their health.  The visit was performed over the telephone as the patient was unable to be seen in the office due to the COVID 19 pandemic.  Today she has some cramping and pain in her abdomen.  She has the urge to have a bowel movement but doesn't always pass stool.  Sometimes the stool is loose.  She is taking the Miralax daily.  She still has some discomfort in the bladder area but it feels a little better with the cefdinir.  Her BP is running in the 100-150/70 range.  She still feels weak and anxious.\par

## 2021-12-20 ENCOUNTER — TRANSCRIPTION ENCOUNTER (OUTPATIENT)
Age: 76
End: 2021-12-20

## 2021-12-21 ENCOUNTER — TRANSCRIPTION ENCOUNTER (OUTPATIENT)
Age: 76
End: 2021-12-21

## 2021-12-21 ENCOUNTER — APPOINTMENT (OUTPATIENT)
Dept: FAMILY MEDICINE | Facility: CLINIC | Age: 76
End: 2021-12-21
Payer: MEDICARE

## 2021-12-21 PROCEDURE — 99442: CPT

## 2021-12-21 NOTE — PLAN
[FreeTextEntry1] : she will take the cefdinir for 10 days and take the lisinopril if the SBP is greater than 140, she was given significant emotional support and advised to try to eat more protein and will follow up in 1 week

## 2021-12-21 NOTE — HISTORY OF PRESENT ILLNESS
[Home] : at home, [unfilled] , at the time of the visit. [Medical Office: ([facility])___] : at the medical office located in  [Verbal consent obtained from patient] : the patient, [unfilled] [de-identified] : The patient telephoned and requested a call back to discuss their health.  The visit was performed over the telephone as the patient was unable to be seen in the office due to the COVID 19 pandemic.\luis enrique Pt presents for a follow up on her abdominal pain, anxiety and bowel movements.  She spoke with the nurse at the clinical call center last night as she was having slight burning on urination and had pain in the pelvic area.  She resumed the cefnir last night and the pain is a little better today.  She feels nauseous today but as the day goes on it is improving and she also has a slight headache.  She had loose stools for 4 days last week and Dr Conklin changed the Miralax to 1 teaspoon daily for 3 days and the diarrhea has resolved.  She still isn't eating too well but she has been able to drink some muscle milk and eat some turkey.  She feels a little stronger today.  Her weight is up to 85 pounds. Her BP has been running in the 110-150/80 range.  When it's high she takes 2.5 mg of lisinopril

## 2021-12-28 ENCOUNTER — APPOINTMENT (OUTPATIENT)
Dept: FAMILY MEDICINE | Facility: CLINIC | Age: 76
End: 2021-12-28
Payer: MEDICARE

## 2021-12-28 DIAGNOSIS — J01.90 ACUTE SINUSITIS, UNSPECIFIED: ICD-10-CM

## 2021-12-28 PROCEDURE — 99442: CPT

## 2021-12-28 NOTE — HISTORY OF PRESENT ILLNESS
[Home] : at home, [unfilled] , at the time of the visit. [Medical Office: ([facility])___] : at the medical office located in  [Verbal consent obtained from patient] : the patient, [unfilled] [FreeTextEntry1] : Pt presents for a follow up on the urinary symptoms and diarrhea. [de-identified] : The patient telephoned and requested a call back to discuss their health.  The visit was performed over the telephone as the patient was unable to be seen in the office due to the COVID 19 pandemic.  She still reports that she feels nauseous and weak at times  She has pain in her legs on and off.  She had pain in the pelvic area for a few hours this morning.  She is still having some abdominal pain on and off.  She is still having difficulty balancing the constipation and diarrhea.  Her BP  was 139/81 this morning and then it was 150/87 and she took the lisinopril and then it went down to 131 systolic.  Most of her readings are in the 130/140 range systolic.  Her weight is 84-85 pounds.  She notes that she is able to eat a little better.  She is seeing Dr Conklin next Tuesday.  \par

## 2021-12-28 NOTE — PLAN
[FreeTextEntry1] : she was advised to continue current medications and to continue to try to eat more. she will see Dr Conklin next week as scheduled, I-STOP was checked and diazepam and cefdinir renewed, she was given significant emotional support during the visit and all of her many questions were answered and she was given encouragement

## 2021-12-30 ENCOUNTER — NON-APPOINTMENT (OUTPATIENT)
Age: 76
End: 2021-12-30

## 2021-12-30 RX ORDER — CEFDINIR 300 MG/1
300 CAPSULE ORAL TWICE DAILY
Qty: 20 | Refills: 0 | Status: DISCONTINUED | COMMUNITY
Start: 2021-12-21 | End: 2021-12-30

## 2022-01-05 ENCOUNTER — APPOINTMENT (OUTPATIENT)
Dept: FAMILY MEDICINE | Facility: CLINIC | Age: 77
End: 2022-01-05
Payer: MEDICARE

## 2022-01-05 PROCEDURE — 99442: CPT

## 2022-01-05 NOTE — HISTORY OF PRESENT ILLNESS
[Home] : at home, [unfilled] , at the time of the visit. [Medical Office: ([facility])___] : at the medical office located in  [Verbal consent obtained from patient] : the patient, [unfilled] [FreeTextEntry1] : Pt presents for a follow up on her abdominal pain and sinus symptoms. [de-identified] : The patient telephoned and requested a call back to discuss their health.  The visit was performed over the telephone as the patient was unable to be seen in the office due to the COVID 19 pandemic.  She is still having abdominal pain on and off and isn't eating well.  She doesn't take the Bactrim when her stomach is upset.  She still feels somewhat weak and has clogged ears and a headache on and off.  She is still trying to regulate her bowel movements.  Her weight went down to 82 pounds.  Her blood pressure has been in the 110-120's/60-70 and she hasn't needed any lisinopril since our last visit.  \par

## 2022-01-05 NOTE — PLAN
[FreeTextEntry1] : she was advised to continue to push herself to eat and to take her medications as prescribed and to follow up next week

## 2022-01-11 ENCOUNTER — APPOINTMENT (OUTPATIENT)
Dept: FAMILY MEDICINE | Facility: CLINIC | Age: 77
End: 2022-01-11
Payer: MEDICARE

## 2022-01-11 PROCEDURE — 99442: CPT

## 2022-01-11 RX ORDER — SULFAMETHOXAZOLE AND TRIMETHOPRIM 800; 160 MG/1; MG/1
800-160 TABLET ORAL
Refills: 0 | Status: DISCONTINUED | COMMUNITY
End: 2022-01-11

## 2022-01-11 RX ORDER — CEFDINIR 300 MG/1
300 CAPSULE ORAL TWICE DAILY
Refills: 0 | Status: DISCONTINUED | COMMUNITY
End: 2022-01-11

## 2022-01-11 NOTE — HISTORY OF PRESENT ILLNESS
[Home] : at home, [unfilled] , at the time of the visit. [Medical Office: ([facility])___] : at the medical office located in  [Verbal consent obtained from patient] : the patient, [unfilled] [FreeTextEntry1] : Pt presents for a follow up on her abdominal pain and weight loss. [de-identified] : The patient telephoned and requested a call back to discuss their health.  The visit was performed over the telephone as the patient was unable to be seen in the office due to the COVID 19 pandemic.  She has been able to eat breakfast most mornings but then has been nauseous and bloated for the afternoon but then has been able to eat some dinner.  Her weight is down to 80 pounds.  She decreased the miralax today to 1/2 teaspoon daily as she has been having incontinence of watery stool.  She has been off of all antibiotics for the past week.  She has been feeling less depressed.  She only takes the lisinopril about once a week.\par

## 2022-01-11 NOTE — PLAN
[FreeTextEntry1] : she was encouraged to eat smaller amounts more frequently to avoid the nausea and bloating, she was advised to call prior to resuming any antibiotics, she was given significant emotional support and all of her questions were answered and she will follow up in 1 week

## 2022-01-12 ENCOUNTER — NON-APPOINTMENT (OUTPATIENT)
Age: 77
End: 2022-01-12

## 2022-01-18 ENCOUNTER — APPOINTMENT (OUTPATIENT)
Dept: FAMILY MEDICINE | Facility: CLINIC | Age: 77
End: 2022-01-18
Payer: MEDICARE

## 2022-01-18 PROCEDURE — 99442: CPT

## 2022-01-18 NOTE — PLAN
[FreeTextEntry1] : she was again encouraged to eat every few hours and she will try to drink the muscle milk, no medication changes were made, we discussed going for an abdominal ultrasound but her daughter is going away and she doesn't want to schedule it now, she will follow up with GI as scheduled and with me in 1 week or sooner prn

## 2022-01-19 ENCOUNTER — NON-APPOINTMENT (OUTPATIENT)
Age: 77
End: 2022-01-19

## 2022-01-19 RX ORDER — SULFAMETHOXAZOLE AND TRIMETHOPRIM 800; 160 MG/1; MG/1
800-160 TABLET ORAL TWICE DAILY
Qty: 28 | Refills: 1 | Status: COMPLETED | COMMUNITY
Start: 2022-01-19 | End: 2022-02-16

## 2022-01-25 ENCOUNTER — APPOINTMENT (OUTPATIENT)
Dept: FAMILY MEDICINE | Facility: CLINIC | Age: 77
End: 2022-01-25
Payer: MEDICARE

## 2022-01-25 PROCEDURE — 99442: CPT

## 2022-01-25 NOTE — PLAN
[FreeTextEntry1] : we discussed an increase in the mirtazapine dose but her daughter is away and she doesn't have anyone to  a new rx, she will continue current tx for now and we will follow up again in 1 week and will discuss the dose increase then

## 2022-01-25 NOTE — HISTORY OF PRESENT ILLNESS
[Home] : at home, [unfilled] , at the time of the visit. [Medical Office: ([facility])___] : at the medical office located in  [Verbal consent obtained from patient] : the patient, [unfilled] [FreeTextEntry1] : Pt presents for a follow up on her sinus symptoms and abdominal pain. [de-identified] : The patient telephoned and requested a call back to discuss their health.  The visit was performed over the telephone as the patient was unable to be seen in the office due to the COVID 19 pandemic.  She has been taking the antibiotic since last week and still has some flu-like muscle aches and fatigue.  She still has the nausea on and off but feels that the abdominal pain has improved.  Her bowel movements have improved and she is no longer having fecal incontinence.  She was able to eat 1 can of tuna fish and had 1-2 protein drinks.  She is still eating bread and donuts.  Her weight is still 80 pounds.  Her BP is in the 120/70 range for the most part.\par

## 2022-02-01 ENCOUNTER — APPOINTMENT (OUTPATIENT)
Dept: FAMILY MEDICINE | Facility: CLINIC | Age: 77
End: 2022-02-01
Payer: MEDICARE

## 2022-02-01 PROCEDURE — 99441: CPT

## 2022-02-01 NOTE — HISTORY OF PRESENT ILLNESS
[Home] : at home, [unfilled] , at the time of the visit. [Medical Office: ([facility])___] : at the medical office located in  [Verbal consent obtained from patient] : the patient, [unfilled] [FreeTextEntry1] : Pt presents for a follow up on the sinus congestion and her abdominal pain.   [de-identified] : The patient telephoned and requested a call back to discuss their health.  The visit was performed over the telephone as the patient was unable to be seen in the office due to the COVID 19 pandemic.  She reports that she did have some abdominal gas last week with some nausea but the abdominal pain has improved.  She had 1 episode of fecal incontinence and decreased the miralax dose by a little bit.  She was able to eat eggs twice.  Her BP is running in the 100-119 for the most part, she did have a few readings in the 90 range systolic.  Her weight is between 80-82 pounds.  The headaches and body aches are slowly improving.  Her legs still feel weak.  \par

## 2022-02-01 NOTE — PLAN
[FreeTextEntry1] : she seems to be slowly improving, I increased the mirtazapine to 2 tabs daily, I-STOP was checked and diazepam renewed and 1 week telephonic visit advised

## 2022-02-08 ENCOUNTER — APPOINTMENT (OUTPATIENT)
Dept: FAMILY MEDICINE | Facility: CLINIC | Age: 77
End: 2022-02-08
Payer: MEDICARE

## 2022-02-08 PROCEDURE — 99442: CPT

## 2022-02-08 NOTE — PLAN
[FreeTextEntry1] : she will lower the mirtazapine to 1 daily for a few days and try to take 2 on 2/14, she will see the eye specialist as scheduled and may renew the bactrim for another week, she will follow up via telephone in 1 week and seems to be less depressed

## 2022-02-08 NOTE — HISTORY OF PRESENT ILLNESS
[Home] : at home, [unfilled] , at the time of the visit. [Medical Office: ([facility])___] : at the medical office located in  [Verbal consent obtained from patient] : the patient, [unfilled] [de-identified] : The patient telephoned and requested a call back to discuss their health.  The visit was performed over the telephone as the patient was unable to be seen in the office due to the COVID 19 pandemic.  She has been feeling less nauseous and has been eating a little better.  Unfortunately she became a little constipated with the higher dose of mirtazapine.  She hasn't had any fecal incontinence since we spoke last.  Her weight is steady at 82 pounds.  She would like to decrease the mirtazapine back to 1 daily and would like to stay on the Bactrim for another week.  She has been seeing lines in her vision and is scheduled to see Dr Cook, a retinal specialist, tomorrow.  Her BP has been running in the 101-121/60-70 range and her heart rate is in the 70-80 range.  She spoke with the RN at the cardiology office who advised her to hold the metoprolol if her heart rate is less than 55 or for SBP less than 90.\par

## 2022-02-16 ENCOUNTER — APPOINTMENT (OUTPATIENT)
Dept: FAMILY MEDICINE | Facility: CLINIC | Age: 77
End: 2022-02-16
Payer: MEDICARE

## 2022-02-16 VITALS — WEIGHT: 84 LBS | BODY MASS INDEX: 13.98 KG/M2

## 2022-02-16 PROCEDURE — 99441: CPT

## 2022-02-16 NOTE — PLAN
[FreeTextEntry1] : she wants to take 2 mirtazapine on Mondays and Fridays and 1 all other days, I think this is reasonable, I renewed the rx, she will continue other medications without change and follow up in 1 week

## 2022-02-16 NOTE — HISTORY OF PRESENT ILLNESS
[Home] : at home, [unfilled] , at the time of the visit. [Medical Office: ([facility])___] : at the medical office located in  [Verbal consent obtained from patient] : the patient, [unfilled] [FreeTextEntry1] : Pt presents for a follow up on weight loss and abdominal pain. [de-identified] : The patient telephoned and requested a call back to discuss their health.  The visit was performed over the telephone as the patient was unable to be seen in the office due to the COVID 19 pandemic.  She reports that she is having less nausea and feels hungrier and is able to eat better.  Her weight is up to 84 pounds.  She took 2 mirtazapine nightly for 3 nights then got a little constipated.  She is taking 1/2 teaspoon of Miralax daily.  She will be finishing the Bactrim today.  The eye doctor diagnosed her with ocular migraines.\par

## 2022-02-23 ENCOUNTER — APPOINTMENT (OUTPATIENT)
Dept: FAMILY MEDICINE | Facility: CLINIC | Age: 77
End: 2022-02-23
Payer: MEDICARE

## 2022-02-23 VITALS — BODY MASS INDEX: 14.14 KG/M2 | WEIGHT: 85 LBS

## 2022-02-23 PROCEDURE — 99442: CPT

## 2022-02-23 NOTE — PLAN
[FreeTextEntry1] : she was advised to incorporate more protein into her diet and to continue current medications and to follow up via telephone next week or sooner prn

## 2022-02-23 NOTE — HISTORY OF PRESENT ILLNESS
[Home] : at home, [unfilled] , at the time of the visit. [Medical Office: ([facility])___] : at the medical office located in  [Verbal consent obtained from patient] : the patient, [unfilled] [FreeTextEntry1] : Pt presents for a follow up on her abdominal pain. [de-identified] : The patient telephoned and requested a call back to discuss their health.  The visit was performed over the telephone as the patient was unable to be seen in the office due to the COVID 19 pandemic.  She is still a little nauseous on and off.  She is eating a little better and her weight is up to 85 pounds.  She is weak on and off and has headaches on and off.  She was eating a lot of apple turnovers and has been more constipated.  She took 2 of the mirtazapine on Saturday as she only took one on Friday.  Her SBP is 100-130 and her DBP is in the 60-70 range and her heart rate is in the 70 range.  She is taking between 1/2 teaspoon and 1 teaspoon of Miralax daily.  She is still off of the antibiotics.  She is taking 1-2 diazepam daily.\par

## 2022-02-25 ENCOUNTER — NON-APPOINTMENT (OUTPATIENT)
Age: 77
End: 2022-02-25

## 2022-03-02 ENCOUNTER — APPOINTMENT (OUTPATIENT)
Dept: FAMILY MEDICINE | Facility: CLINIC | Age: 77
End: 2022-03-02
Payer: MEDICARE

## 2022-03-02 PROCEDURE — 99441: CPT

## 2022-03-02 NOTE — HISTORY OF PRESENT ILLNESS
[Home] : at home, [unfilled] , at the time of the visit. [Medical Office: ([facility])___] : at the medical office located in  [Verbal consent obtained from patient] : the patient, [unfilled] [FreeTextEntry1] : Pt presents for a follow up on constipation. [de-identified] : The patient telephoned and requested a call back to discuss their health.  The visit was performed over the telephone as the patient was unable to be seen in the office due to the COVID 19 pandemic.  She has been having better bowel movements and just decrease the Miralax to 1 teaspoon daily again.  She is still on the 1/2 tab of mirtazapine daily.  She has been having more congestion and more sinus headaches and mild body aches.  Her weight is still 85 pounds.  Her appetite is a little less but she is forcing herself to eat even when not hungry.  Her BP has been fluctuating a little, sometimes up to 140 systolic but is usually normal.  The heart rate is 60-90 and the diastolic BP is 60-70.  She would like to renew the diazepam, she is taking 1-2 tabs daily\par

## 2022-03-02 NOTE — PLAN
[FreeTextEntry1] : I-STOP was checked and diazepam renewed, she will take bactrim as directed and continue on 1/2 tab of mirtazapine daily and follow up in 1 week or sooner prn

## 2022-03-09 ENCOUNTER — APPOINTMENT (OUTPATIENT)
Dept: FAMILY MEDICINE | Facility: CLINIC | Age: 77
End: 2022-03-09
Payer: MEDICARE

## 2022-03-09 PROCEDURE — 99442: CPT

## 2022-03-09 NOTE — HISTORY OF PRESENT ILLNESS
[Home] : at home, [unfilled] , at the time of the visit. [Medical Office: ([facility])___] : at the medical office located in  [Verbal consent obtained from patient] : the patient, [unfilled] [FreeTextEntry1] : Pt presents for a follow up on the sinus infection and constipation. [de-identified] : The patient telephoned and requested a call back to discuss their health.  The visit was performed over the telephone as the patient was unable to be seen in the office due to the COVID 19 pandemic.  She reports that the sinus and flu like symptoms are improving.  She started with pelvic cramping on Sunday and started herself on Cefdinir and it is helping.  She tried to take 1 tab of mirtazapine every other day alternating with 1/2 tab every other day but was more constipated and went down to 1/2 tab daily.  Her appetite isn't as good as it was.  She still has some nausea on and off.  Her weight is still 85 pounds.  Her BP is in the /60 range and her heart rate is in the 70-80 range.  She is drinking 1-2 muscle milk drinks daily in addition to eating\par

## 2022-03-09 NOTE — PLAN
[FreeTextEntry1] : I renewed the cefdinir for her, she will finish the Bactrim, she will continue current diet and monitoring of her weight, blood pressure and heart rate, she will try to increase mirtazapine to a full pill once a week and 1/2 tab on the other days and follow up in 1 week

## 2022-03-13 NOTE — CURRENT MEDS
[Takes medication as prescribed] : takes [None] : Patient does not have any barriers to medication adherence [Yes] : Reviewed medication list for presence of high-risk medications. [Benzodiazepines] : benzodiazepines negative...

## 2022-03-15 ENCOUNTER — APPOINTMENT (OUTPATIENT)
Dept: FAMILY MEDICINE | Facility: CLINIC | Age: 77
End: 2022-03-15
Payer: MEDICARE

## 2022-03-15 PROCEDURE — 99441: CPT

## 2022-03-15 NOTE — HISTORY OF PRESENT ILLNESS
[Home] : at home, [unfilled] , at the time of the visit. [Medical Office: ([facility])___] : at the medical office located in  [Verbal consent obtained from patient] : the patient, [unfilled] [FreeTextEntry1] : Pt presents for a follow up on the sinus congestion, body aches and urinary symptoms. [de-identified] : The patient telephoned and requested a call back to discuss their health.  The visit was performed over the telephone as the patient was unable to be seen in the office due to the COVID 19 pandemic.  She reports that the bladder pain is better but she still has body aches.  She increased the mirtazapine to 1 tab every 3rd day with the 1/2 on the days in between.  She is taking 1 and 1/4 doses of Miralax daily.  Her weight is still 85 pounds.  She is trying to eat more protein.  She is drinking muscle milk.  Her BP is in the 100-120/60-70 range and her pulse is in the 80 range.  She has slight nausea and abdominal distress in the morning and then when she eats it feels better.\par

## 2022-03-22 ENCOUNTER — APPOINTMENT (OUTPATIENT)
Dept: FAMILY MEDICINE | Facility: CLINIC | Age: 77
End: 2022-03-22
Payer: MEDICARE

## 2022-03-22 PROCEDURE — 99442: CPT

## 2022-03-22 NOTE — PLAN
[FreeTextEntry1] : she was advised to continue current treatment and to stay on both antibiotics for now, she will continue to push herself to eat and will follow up in 1 week or sooner prn

## 2022-03-22 NOTE — HISTORY OF PRESENT ILLNESS
[Home] : at home, [unfilled] , at the time of the visit. [Medical Office: ([facility])___] : at the medical office located in  [FreeTextEntry1] : Pt presents for a follow up on her anxiety, constipation and sinus symptoms. [de-identified] : The patient telephoned and requested a call back to discuss their health.  The visit was performed over the telephone as the patient was unable to be seen in the office due to the COVID 19 pandemic.  For the past 2 days she has been feeling more weak and achy.  The bladder pain has nearly resolved.  She is still taking the bactrim and cefdinir.  She is still taking the full pill every 3rd day with the half tabs on the days in between.  She reduced the Miralax to 1 dose daily.  She still feels a little nauseous on and off.  Her weight is between 85-86 pounds.  She is generally eating better.  Her BP has been in the 100-120/60 range with her pulse in the 70-90 range.\par

## 2022-03-30 ENCOUNTER — APPOINTMENT (OUTPATIENT)
Dept: FAMILY MEDICINE | Facility: CLINIC | Age: 77
End: 2022-03-30
Payer: MEDICARE

## 2022-03-30 VITALS — TEMPERATURE: 97.9 F

## 2022-03-30 VITALS
WEIGHT: 86 LBS | SYSTOLIC BLOOD PRESSURE: 130 MMHG | HEIGHT: 65 IN | OXYGEN SATURATION: 98 % | HEART RATE: 81 BPM | DIASTOLIC BLOOD PRESSURE: 80 MMHG | RESPIRATION RATE: 16 BRPM | BODY MASS INDEX: 14.33 KG/M2

## 2022-03-30 DIAGNOSIS — E53.8 DEFICIENCY OF OTHER SPECIFIED B GROUP VITAMINS: ICD-10-CM

## 2022-03-30 DIAGNOSIS — B37.3 CANDIDIASIS OF VULVA AND VAGINA: ICD-10-CM

## 2022-03-30 PROCEDURE — 96372 THER/PROPH/DIAG INJ SC/IM: CPT

## 2022-03-30 PROCEDURE — 99213 OFFICE O/P EST LOW 20 MIN: CPT | Mod: 25

## 2022-03-30 RX ORDER — CYANOCOBALAMIN 1000 UG/ML
1000 INJECTION INTRAMUSCULAR; SUBCUTANEOUS
Qty: 0 | Refills: 0 | Status: COMPLETED | OUTPATIENT
Start: 2022-03-30

## 2022-03-30 RX ADMIN — CYANOCOBALAMIN 0 MCG/ML: 1000 INJECTION INTRAMUSCULAR; SUBCUTANEOUS at 00:00

## 2022-03-30 NOTE — REVIEW OF SYSTEMS
[Fatigue] : fatigue [Nasal Discharge] : nasal discharge [Postnasal Drip] : postnasal drip [Abdominal Pain] : abdominal pain [Nausea] : nausea [Constipation] : constipation [Insomnia] : insomnia [Anxiety] : anxiety [Negative] : Heme/Lymph [Recent Change In Weight] : ~T no recent weight change [Cough] : no cough [Vaginal Discharge] : no vaginal discharge [Suicidal] : not suicidal [Depression] : no depression [FreeTextEntry8] : vaginal itching

## 2022-03-30 NOTE — PHYSICAL EXAM
[No Acute Distress] : no acute distress [Well Developed] : well developed [Normal Sclera/Conjunctiva] : normal sclera/conjunctiva [Normal Outer Ear/Nose] : the outer ears and nose were normal in appearance [No Lymphadenopathy] : no lymphadenopathy [Supple] : supple [No Respiratory Distress] : no respiratory distress  [No Accessory Muscle Use] : no accessory muscle use [Clear to Auscultation] : lungs were clear to auscultation bilaterally [Normal Rate] : normal rate  [Regular Rhythm] : with a regular rhythm [Normal S1, S2] : normal S1 and S2 [Urethral Meatus] : normal urethra [External Female Genitalia] : normal external genitalia [Atrophy] : atrophy [Erythematous] : erythema [Dry Mucosa] : a dry mucosa [No Bleeding] : no active bleeding [Discharge] : no discharge [Foreign Body] : no foreign body in the vault [de-identified] : TM's dull, decreased light reflexes

## 2022-03-30 NOTE — HISTORY OF PRESENT ILLNESS
[FreeTextEntry8] : pt c/o vaginal itch x 2 weeks.  She also presents for her follow up on her other symptoms.  She still has some flulike body aches but they are getting better.  She is eating a little better.  She is on the same regimen of Miralax and Mirtazapine.  She has itching in the right side of her vagina.  She tried the hydrocortisone cream and it burned and she tried applying Vaseline.  She is still taking both antibiotics

## 2022-04-06 ENCOUNTER — APPOINTMENT (OUTPATIENT)
Dept: FAMILY MEDICINE | Facility: CLINIC | Age: 77
End: 2022-04-06
Payer: MEDICARE

## 2022-04-06 VITALS — WEIGHT: 87 LBS | BODY MASS INDEX: 14.48 KG/M2

## 2022-04-06 PROCEDURE — 99442: CPT

## 2022-04-06 RX ORDER — CEFDINIR 300 MG/1
300 CAPSULE ORAL
Qty: 28 | Refills: 1 | Status: DISCONTINUED | COMMUNITY
Start: 2022-03-09 | End: 2022-04-06

## 2022-04-06 NOTE — PLAN
[FreeTextEntry1] : I-STOP was checked and #120 tabs of diazepam was prescribed, Bactrim was renewed, she will continue to eat as much as she comfortably can in an effort to further increase her weight and will follow up via telephone in 1 week or sooner prn

## 2022-04-06 NOTE — HISTORY OF PRESENT ILLNESS
[Home] : at home, [unfilled] , at the time of the visit. [Medical Office: ([facility])___] : at the medical office located in  [Verbal consent obtained from patient] : the patient, [unfilled] [FreeTextEntry1] : Pt presents for a follow up on the vaginal yeast infection, sinus congestion and her weight. [de-identified] : The patient telephoned and requested a call back to discuss their health.  The visit was performed over the telephone as the patient was unable to be seen in the office due to the COVID 19 pandemic.  She reports that the vaginal yeast infection has improved a lot with the cream.  She still has some sinus pain and pressure and stayed on the Bactrim but stopped the Cefdinir.  She is eating better and has an improved appetite.  Her bowels have been a little sluggish but not really constipated and she hasn't had any fecal incontinence.  Her weight is up to 87 pounds.  She would like to have a larger quantity of the diazepam as her daughter is going away to [place] and she won't have anyone to  her medications while she is away.  Her BP is in the 110/60 range and her heart rate is in the 70's\par

## 2022-04-13 ENCOUNTER — APPOINTMENT (OUTPATIENT)
Dept: FAMILY MEDICINE | Facility: CLINIC | Age: 77
End: 2022-04-13
Payer: MEDICARE

## 2022-04-13 PROCEDURE — 99441: CPT

## 2022-04-13 NOTE — HISTORY OF PRESENT ILLNESS
[Home] : at home, [unfilled] , at the time of the visit. [Medical Office: ([facility])___] : at the medical office located in  [Verbal consent obtained from patient] : the patient, [unfilled] [FreeTextEntry1] : Pt presents for a follow up on her sinus symptoms and weight loss. [de-identified] : The patient telephoned and requested a call back to discuss their health.  The visit was performed over the telephone as the patient was unable to be seen in the office due to the COVID 19 pandemic.  She has had a little more sinus congestion, sneezing and fatigue.  She uses her nasal sprays regularly and stayed on the Bactrim.  She still has mild nausea and discomfort but it doesn't last for long.  Her BM's have been under good control.  Her weight is up to 88 pounds.  She is tolerating the mirtazapine at 1/2 tab daily for 2 days and a full pill every 3 days.  Her BP has been in the  range systolic and 60 range diastolic, her pulse is in the 70-80 range.  The yeast infection is clearing up\par

## 2022-04-20 ENCOUNTER — APPOINTMENT (OUTPATIENT)
Dept: FAMILY MEDICINE | Facility: CLINIC | Age: 77
End: 2022-04-20
Payer: MEDICARE

## 2022-04-20 VITALS — BODY MASS INDEX: 14.98 KG/M2 | WEIGHT: 90 LBS

## 2022-04-20 PROCEDURE — 99442: CPT

## 2022-04-20 NOTE — PLAN
[FreeTextEntry1] : she is slowly improving, she will continue current tx and asked for a 7 day rx for Bactrim to have on hand as her daughter is going away and she is afraid to be without it

## 2022-04-20 NOTE — HISTORY OF PRESENT ILLNESS
[Home] : at home, [unfilled] , at the time of the visit. [Medical Office: ([facility])___] : at the medical office located in  [Verbal consent obtained from patient] : the patient, [unfilled] [FreeTextEntry1] : Pt presents for a follow up on her sinus infection and abdominal pain. [de-identified] : The patient telephoned and requested a call back to discuss their health.  The visit was performed over the telephone as the patient was unable to be seen in the office due to the COVID 19 pandemic.  She saw the cardiologist who wasn't concerned about the elevated cholesterol.  She will follow up for an echo and may see their nutritionist.  She has had some sinus pain and pressure especially in the mornings.  She still has some abdominal discomfort on and off.  Her weight is up to 90-91 pounds.  Her bowel movements are normal.  She is still taking 1 mirtazapine every 3 days with 1/2 tab on the days in between.\par

## 2022-04-27 ENCOUNTER — APPOINTMENT (OUTPATIENT)
Dept: FAMILY MEDICINE | Facility: CLINIC | Age: 77
End: 2022-04-27
Payer: MEDICARE

## 2022-04-27 PROCEDURE — 99442: CPT

## 2022-04-27 NOTE — HISTORY OF PRESENT ILLNESS
[Home] : at home, [unfilled] , at the time of the visit. [Medical Office: ([facility])___] : at the medical office located in  [Verbal consent obtained from patient] : the patient, [unfilled] [FreeTextEntry1] : Pt presents for a follow up on her weight and sinus issues [de-identified] : The patient telephoned and requested a call back to discuss their health.  The visit was performed over the telephone as the patient was unable to be seen in the office due to the COVID 19 pandemic.  She reports that she is still eating to the best of her ability and is drinking the Muscle Milk. Her weight is still 91-92 pounds and her BP is in the 103-128/70 range and her heart rate is in the 70's.  She has some ear pain and persistent sinus congestion and is still taking the Bactrim.  Her bowel movements have regulated and she is still taking the Miralax and is taking the mirtazapine as before (1/2 tab daily for 2 days with a full tab every 3rd day)\par

## 2022-04-27 NOTE — PLAN
[FreeTextEntry1] : her conditions are stable and no medication changes were made, she is still afraid to receive the Covid vaccine and I reiterated the need for continued wearing of the masks, social distancing and regular use of hand soap and sanitizers

## 2022-05-04 ENCOUNTER — APPOINTMENT (OUTPATIENT)
Dept: FAMILY MEDICINE | Facility: CLINIC | Age: 77
End: 2022-05-04
Payer: MEDICARE

## 2022-05-04 DIAGNOSIS — L24.9 IRRITANT CONTACT DERMATITIS, UNSPECIFIED CAUSE: ICD-10-CM

## 2022-05-04 DIAGNOSIS — R19.5 OTHER FECAL ABNORMALITIES: ICD-10-CM

## 2022-05-04 PROCEDURE — 99441: CPT

## 2022-05-04 NOTE — HISTORY OF PRESENT ILLNESS
[Home] : at home, [unfilled] , at the time of the visit. [Medical Office: ([facility])___] : at the medical office located in  [Verbal consent obtained from patient] : the patient, [unfilled] [FreeTextEntry1] : Pt presents for a follow up on her abdominal pain, constipation and sinus pain [de-identified] : The patient telephoned and requested a call back to discuss their health.  The visit was performed over the telephone as the patient was unable to be seen in the office due to the COVID 19 pandemic.  She had bad abdominal pain this past Sunday but it only lasted for a day.  Her weight is up to 93-94 pounds and she has a little more energy.  She still has pain in the right side of her face on and off.  She plans to stop the Bactrim after Saturday's dose.  Her blood pressure is in the 110-120/60 range and heart rate 60-90.  Her bowel movements are under good control.\par

## 2022-05-04 NOTE — PLAN
[FreeTextEntry1] : she will stop the Bactrim on Saturday and continue other medications without change and she will follow up in 1 week or sooner prn

## 2022-05-11 ENCOUNTER — APPOINTMENT (OUTPATIENT)
Dept: FAMILY MEDICINE | Facility: CLINIC | Age: 77
End: 2022-05-11
Payer: MEDICARE

## 2022-05-11 PROCEDURE — 99441: CPT

## 2022-05-11 NOTE — PLAN
[FreeTextEntry1] : the medications were reviewed as requested, she will continue to try to consume more calories as she is able to and will follow up in 1 week via telephone or sooner prn

## 2022-05-11 NOTE — HISTORY OF PRESENT ILLNESS
[Home] : at home, [unfilled] , at the time of the visit. [Medical Office: ([facility])___] : at the medical office located in  [Verbal consent obtained from patient] : the patient, [unfilled] [FreeTextEntry1] : Pt presents for a follow up on her abdominal pain, weight and sinus infection [de-identified] : The patient telephoned and requested a call back to discuss their health.  The visit was performed over the telephone as the patient was unable to be seen in the office due to the COVID 19 pandemic.  Her abdominal pain had worsened yesterday and this morning but has improved now.  She had body aches over the weekend and has worsening pain in the right side of her head and in her ear and she would like to go back on Bactrim.  Since last night her urine has a strong odor and she started Cefdinir and it is helping and she needs a renewal.  Her weight is 94-95 pounds and her BP has been in the 120-140/70-80 range.  She took Lisinopril this morning as the top number was over 140 but then it went down to 120's\par

## 2022-05-16 ENCOUNTER — NON-APPOINTMENT (OUTPATIENT)
Age: 77
End: 2022-05-16

## 2022-05-18 ENCOUNTER — APPOINTMENT (OUTPATIENT)
Dept: FAMILY MEDICINE | Facility: CLINIC | Age: 77
End: 2022-05-18
Payer: MEDICARE

## 2022-05-18 PROCEDURE — 99441: CPT

## 2022-05-18 NOTE — PLAN
[FreeTextEntry1] : she was advised to continue current medications, she will stop the cefdinir in the next few days, she will continue to push herself to eat more and will follow up in 1 week or sooner prn

## 2022-05-18 NOTE — HISTORY OF PRESENT ILLNESS
[Home] : at home, [unfilled] , at the time of the visit. [Medical Office: ([facility])___] : at the medical office located in  [Verbal consent obtained from patient] : the patient, [unfilled] [FreeTextEntry1] : She presents for a follow up on the sinus symptoms and abdominal pain. [de-identified] : The patient telephoned and requested a call back to discuss their health.  The visit was performed over the telephone as the patient was unable to be seen in the office due to the COVID 19 pandemic.  She had a little more abdominal pain and nausea last week but it isn't lasting as long.  Her weight is down to 92 pounds.  She was a little constipated and increased the Miralax and now her stool is a little loose.  She is sleeping well.  The urinary symptoms have resolved and she will stop the cefdinir.  Her BP has been in the normal range.  \par

## 2022-05-24 ENCOUNTER — APPOINTMENT (OUTPATIENT)
Dept: FAMILY MEDICINE | Facility: CLINIC | Age: 77
End: 2022-05-24
Payer: MEDICARE

## 2022-05-24 PROCEDURE — 99441: CPT

## 2022-05-24 NOTE — HISTORY OF PRESENT ILLNESS
[Home] : at home, [unfilled] , at the time of the visit. [Medical Office: ([facility])___] : at the medical office located in  [Verbal consent obtained from patient] : the patient, [unfilled] [FreeTextEntry1] : Pt presents for a follow up visit on her abdominal pain and sinus symptoms. [de-identified] : The patient telephoned and requested a call back to discuss their health.  The visit was performed over the telephone as the patient was unable to be seen in the office due to the COVID 19 pandemic.  She awoke today and she has a headache, body aches and feels flu-like.  She hasn't had any nausea.  She is still taking the Bactrim.  She has a good appetite and she ran out of some food that she likes to eat and her weight is down to 90 pounds.  Her BP has been in the 110-120/60-70 range with a heart rate in the 60-80 range.  Her bowels are a little sluggish but she didn't increase the Miralax dose.  \par

## 2022-05-24 NOTE — PLAN
[FreeTextEntry1] : she will continue the Bactrim and her other medications and she will increase caloric intake if able and will follow up in 1 week or sooner prn

## 2022-05-31 ENCOUNTER — APPOINTMENT (OUTPATIENT)
Dept: FAMILY MEDICINE | Facility: CLINIC | Age: 77
End: 2022-05-31
Payer: MEDICARE

## 2022-05-31 PROCEDURE — 99442: CPT

## 2022-05-31 NOTE — HISTORY OF PRESENT ILLNESS
[Home] : at home, [unfilled] , at the time of the visit. [Medical Office: ([facility])___] : at the medical office located in  [Verbal consent obtained from patient] : the patient, [unfilled] [FreeTextEntry1] : Pt presents for a follow up on her sinus symptoms and weight. [de-identified] : The patient telephoned and requested a call back to discuss their health.  The visit was performed over the telephone as the patient was unable to be seen in the office due to the COVID 19 pandemic.  Unfortunately her daughter and  tested positive for Covid after returning from [place].  She wasn't able to start using the eye drops yet but they will be delivered tomorrow.  She is now using [facility] in [place] for her pharmacy.  Her weight is 89-90 pounds.  Her BP is in range and her abdominal pain is better.  She is very nervous now that she get Covid\par

## 2022-05-31 NOTE — PLAN
[FreeTextEntry1] : she will continue current medications, we reviewed the symptoms of Covid, she was given significant emotional support today and she will follow up in 1 week or sooner for any worsening symptoms.  She was advised not to see her daughter for at least 2 weeks

## 2022-06-07 ENCOUNTER — APPOINTMENT (OUTPATIENT)
Dept: FAMILY MEDICINE | Facility: CLINIC | Age: 77
End: 2022-06-07
Payer: MEDICARE

## 2022-06-07 VITALS — WEIGHT: 89 LBS | BODY MASS INDEX: 14.81 KG/M2

## 2022-06-07 PROCEDURE — 99441: CPT

## 2022-06-07 RX ORDER — SULFAMETHOXAZOLE AND TRIMETHOPRIM 800; 160 MG/1; MG/1
800-160 TABLET ORAL
Qty: 28 | Refills: 1 | Status: DISCONTINUED | COMMUNITY
Start: 2022-03-02 | End: 2022-06-07

## 2022-06-07 NOTE — HISTORY OF PRESENT ILLNESS
[Home] : at home, [unfilled] , at the time of the visit. [Medical Office: ([facility])___] : at the medical office located in  [Verbal consent obtained from patient] : the patient, [unfilled] [FreeTextEntry1] : Pt presents for a follow up on her abdominal pain and sinus symptoms [de-identified] : The patient telephoned and requested a call back to discuss their health.  The visit was performed over the telephone as the patient was unable to be seen in the office due to the COVID 19 pandemic.  For the past few days she has had more abdominal gas pains and bloating and her stools are a little loose.  She didn't take the Miralax yesterday or today.  She stopped the Bactrim as of 6/5 and the flu like symptoms resolved.  She is feeling a little more anxious.  Her weight yesterday was 89 pounds\par

## 2022-06-07 NOTE — PLAN
[FreeTextEntry1] : she will hold the Miralax again today and resume use tomorrow, will increase the mirtazapine to 1/2 tab every other night alternating with 1 tab every other night, she was given emotional support and will follow up via telephone in 1 week

## 2022-06-08 ENCOUNTER — NON-APPOINTMENT (OUTPATIENT)
Age: 77
End: 2022-06-08

## 2022-06-15 ENCOUNTER — APPOINTMENT (OUTPATIENT)
Dept: FAMILY MEDICINE | Facility: CLINIC | Age: 77
End: 2022-06-15
Payer: MEDICARE

## 2022-06-15 PROCEDURE — 99442: CPT

## 2022-06-15 RX ORDER — CEFDINIR 300 MG/1
300 CAPSULE ORAL
Qty: 28 | Refills: 1 | Status: DISCONTINUED | COMMUNITY
Start: 2022-05-11 | End: 2022-06-15

## 2022-06-15 NOTE — HISTORY OF PRESENT ILLNESS
[Home] : at home, [unfilled] , at the time of the visit. [Medical Office: ([facility])___] : at the medical office located in  [Verbal consent obtained from patient] : the patient, [unfilled] [FreeTextEntry1] : She presents for a follow up on abdominal symptoms and sinus symptoms.\par  [de-identified] : The patient telephoned and requested a call back to discuss their health.  The visit was performed over the telephone as the patient was unable to be seen in the office due to the COVID 19 pandemic.  Over the weekend she had some abdominal discomfort and loose stools with some fecal incontinence.  She just reduced the Miralax to 1/2 dose daily.  She started the Bactrim again 2 days ago due to a recurrence of the right sided sinus pain.  Her weight is down to 86 pounds.  She is pushing herself to eat better\par

## 2022-06-15 NOTE — PLAN
[FreeTextEntry1] : she was advised to continue taking the Bactrim, I-STOP was checked and diazepam renewed and she asked for a renewal of the compazine, she was advised to take Miralax 1/2 dose every other day alternating with a full dose every other day, she is taking mirtazapine 1 tab every other night alternating with 1/2 tab every other night.  She was given significant encouragement and emotional support during the visit

## 2022-06-22 ENCOUNTER — APPOINTMENT (OUTPATIENT)
Dept: FAMILY MEDICINE | Facility: CLINIC | Age: 77
End: 2022-06-22
Payer: MEDICARE

## 2022-06-22 PROCEDURE — 99441: CPT

## 2022-06-22 NOTE — HISTORY OF PRESENT ILLNESS
[Home] : at home, [unfilled] , at the time of the visit. [Medical Office: ([facility])___] : at the medical office located in  [Verbal consent obtained from patient] : the patient, [unfilled] [FreeTextEntry1] : Pt presents for a follow up on her constipation and sinus symptoms. [de-identified] : The patient telephoned and requested a call back to discuss their health.  The visit was performed over the telephone as the patient was unable to be seen in the office due to the COVID 19 pandemic.  She had cramps and abdominal bloating and gas pains this morning.  She has been taking Pepcid, omeprazole and it is helping.  Her last weight is 88 pounds.  She still has the pain and pressure behind the right eye.  She is still getting the night sweats but is otherwise sleeping well.  She hasn't had any more stool incontinence, she is still on the 1/2 dose of Miralax.  Her BP is 110-120/60-70 range and her heart rate is in the 60-80 range.  She is taking 1/2 of mirtazapine every other night alternating with 1 tab every other night\par

## 2022-06-22 NOTE — PLAN
[FreeTextEntry1] : she was encouraged to continue efforts to eat regularly, no medication changes were made and she will follow up telephonically next week or sooner prn

## 2022-06-24 ENCOUNTER — NON-APPOINTMENT (OUTPATIENT)
Age: 77
End: 2022-06-24

## 2022-06-29 ENCOUNTER — APPOINTMENT (OUTPATIENT)
Dept: FAMILY MEDICINE | Facility: CLINIC | Age: 77
End: 2022-06-29
Payer: MEDICARE

## 2022-06-29 PROCEDURE — 99442: CPT

## 2022-06-29 NOTE — HISTORY OF PRESENT ILLNESS
[Home] : at home, [unfilled] , at the time of the visit. [Medical Office: ([facility])___] : at the medical office located in  [Verbal consent obtained from patient] : the patient, [unfilled] [FreeTextEntry1] : Pt presents for a follow up on her abdominal pain [de-identified] : The patient telephoned and requested a call back to discuss their health.  The visit was performed over the telephone as the patient was unable to be seen in the office due to the COVID 19 pandemic.  She felt well over the weekend but on 6/27 and today she has increased intestinal gas, bloating and crampy abdominal pain.  Her stools were a little sluggish and she increased the Miralax then had loose stool.  She is taking the full mirtazapine nightly.  She is feeling anxious.  She has less sinus congestion and her nasal discharge is white.  She is taking Gas X, omeprazole and today she took Compazine.  She is taking the diazepam 1-2 times per day.  Her weight is still 88 pounds and her BP is 110-120/60-70 and her heart rate is 60-90\par

## 2022-06-29 NOTE — PLAN
[FreeTextEntry1] : she was advised to reduce the Bactrim to once a day for the next week and to continue other medications without change, she was given a lot of emotional support and all of her questions were answered

## 2022-07-06 ENCOUNTER — APPOINTMENT (OUTPATIENT)
Dept: FAMILY MEDICINE | Facility: CLINIC | Age: 77
End: 2022-07-06

## 2022-07-06 PROCEDURE — 99442: CPT

## 2022-07-06 NOTE — HISTORY OF PRESENT ILLNESS
[Home] : at home, [unfilled] , at the time of the visit. [Medical Office: ([facility])___] : at the medical office located in  [Verbal consent obtained from patient] : the patient, [unfilled] [FreeTextEntry1] : Pt presents for a follow up on her anxiety and abdominal pain [de-identified] : The patient telephoned and requested a call back to discuss their health.  The visit was performed over the telephone as the patient was unable to be seen in the office due to the COVID 19 pandemic.  She reports that the abdominal pain and bloating have been better and her weight is up to 90 pounds.  She is taking the full mirtazapine and about 2/3 of the miralax daily.  Her BP is in the 110-120/60 range.  She is taking 1-2 Bactrim daily and still has nasal discharge and would like a renewal.  Her downstairs neighbor was with her granddaughter who tested positive for Covid.  The neighbor isn't positive yet.  They have separate entrances and she is asking what her risk of getting Covid is.\par

## 2022-07-06 NOTE — PLAN
[FreeTextEntry1] : her conditions are improving and no medication changes were made, I renewed the Bactrim as she would like to have the rx on hand.  I discussed that she would have to be within 6 feet of her neighbor for at least 15 minutes in order to contract Covid and she will monitor for any worrisome symptoms

## 2022-07-13 ENCOUNTER — APPOINTMENT (OUTPATIENT)
Dept: FAMILY MEDICINE | Facility: CLINIC | Age: 77
End: 2022-07-13

## 2022-07-13 PROCEDURE — 99441: CPT

## 2022-07-13 NOTE — HISTORY OF PRESENT ILLNESS
[Home] : at home, [unfilled] , at the time of the visit. [Medical Office: ([facility])___] : at the medical office located in  [Verbal consent obtained from patient] : the patient, [unfilled] [FreeTextEntry1] : Pt presents for a follow up on abdominal pain and anxiety [de-identified] : The patient telephoned and requested a call back to discuss their health.  The visit was performed over the telephone as the patient was unable to be seen in the office due to the COVID 19 pandemic.  She had been feeling better with the abdominal pain but yesterday and today she feels more gassy and bloating.  Her last weight was 91 pounds this morning.  She stopped the Bactrim today.  She is still feeling a little weak.  She is still getting the sweats at night.  She was a little constipated and increased the Miralax to a full dose and decreased the mirtazapine to 1/2 tab for 2 days and now is back to 1 full tab.  Her BP has been normal.\par

## 2022-07-13 NOTE — PLAN
[FreeTextEntry1] : she was advised to stop the Bactrim, no other medication changes were made, she will continue to increase caloric intake as she is able to

## 2022-07-20 ENCOUNTER — APPOINTMENT (OUTPATIENT)
Dept: FAMILY MEDICINE | Facility: CLINIC | Age: 77
End: 2022-07-20

## 2022-07-20 PROCEDURE — 99442: CPT

## 2022-07-20 RX ORDER — SULFAMETHOXAZOLE AND TRIMETHOPRIM 800; 160 MG/1; MG/1
800-160 TABLET ORAL
Qty: 28 | Refills: 1 | Status: DISCONTINUED | COMMUNITY
Start: 2022-06-15 | End: 2022-07-20

## 2022-07-20 NOTE — PLAN
[FreeTextEntry1] : her conditions are slowly improving and no medication changes were made, she was given emotional support and will continue to slowly increase her caloric intake and monitor her weight and blood pressure

## 2022-07-20 NOTE — HISTORY OF PRESENT ILLNESS
[Home] : at home, [unfilled] , at the time of the visit. [Medical Office: ([facility])___] : at the medical office located in  [Verbal consent obtained from patient] : the patient, [unfilled] [FreeTextEntry1] : Pt presents for a follow up for her abdominal pain and anxiety. [de-identified] : The patient telephoned and requested a call back to discuss their health.  The visit was performed over the telephone as the patient was unable to be seen in the office due to the COVID 19 pandemic.  She reports that the abdominal pain has been better and her weight is up to 92 pounds.  She hasn't been taking the Bactrim.  She had a sinus headache on Sunday but after it rained it improved.  She is sleeping well and her blood pressure is under good control.\par

## 2022-07-26 ENCOUNTER — APPOINTMENT (OUTPATIENT)
Dept: FAMILY MEDICINE | Facility: CLINIC | Age: 77
End: 2022-07-26

## 2022-07-26 PROCEDURE — 99441: CPT

## 2022-07-26 NOTE — HISTORY OF PRESENT ILLNESS
[Home] : at home, [unfilled] , at the time of the visit. [Medical Office: ([facility])___] : at the medical office located in  [Verbal consent obtained from patient] : the patient, [unfilled] [FreeTextEntry1] : Pt presents for a follow up on the night sweats and sinus infection [de-identified] : The patient telephoned and requested a call back to discuss their health.  The visit was performed over the telephone as the patient was unable to be seen in the office due to the COVID 19 pandemic.  She had a sinus headache over the weekend and restarted the Bactrim and she is feeling better now.  The abdominal symptoms are improving and her weight is up to 93.  When she felt ill over the weekend her BP was 135-151/60-70 but now it is back to 120-130/60-70.  She took 1 dose of lisinopril when it was high.  She is still waking up with the night sweats, now it is mostly on the upper half of her body.  She takes the black cohosh \par

## 2022-07-26 NOTE — PLAN
[FreeTextEntry1] : she declined having lab testing done at this time, no medication changes were made and she was advised to start drinking soy milk and to follow up in 1 week or sooner prn

## 2022-07-29 ENCOUNTER — LABORATORY RESULT (OUTPATIENT)
Age: 77
End: 2022-07-29

## 2022-08-03 ENCOUNTER — APPOINTMENT (OUTPATIENT)
Dept: FAMILY MEDICINE | Facility: CLINIC | Age: 77
End: 2022-08-03

## 2022-08-04 ENCOUNTER — APPOINTMENT (OUTPATIENT)
Dept: FAMILY MEDICINE | Facility: CLINIC | Age: 77
End: 2022-08-04

## 2022-08-04 VITALS — BODY MASS INDEX: 15.64 KG/M2 | WEIGHT: 94 LBS

## 2022-08-04 DIAGNOSIS — K21.9 GASTRO-ESOPHAGEAL REFLUX DISEASE W/OUT ESOPHAGITIS: ICD-10-CM

## 2022-08-04 PROCEDURE — 99443: CPT

## 2022-08-04 NOTE — ASSESSMENT
[FreeTextEntry1] : Please note this assessment was done using telemedicine as a result of social distancing due to the outbreak of COVID 19 .\par patient has weekly call to check in she was feeling awful but feels better now. she is on omeprazole and valium and mirtazapine. it is helping. she has no air conditioner but feels the temp is ok. advised to drink water constantly.

## 2022-08-04 NOTE — HISTORY OF PRESENT ILLNESS
[FreeTextEntry8] : Patient initiated communication for concern via HIPAA secure platform  (Telemedicine )  for                      using              .Reviewed quarantine instructions and self isolation to limit spread of disease  as per [place] guidelines.  Patient is an established patient and has verbalized consent to be treated via telemedicine .\par she would like to discuss  dizziness hard to walk and weakness due to the weather this am .it went away  w  coffee. she had body aches and it went away. she had bad gastritis 2 y ago she was down to 76   and now she is 93 pounds. Last week she thought she had covid but she didn’t. \par She takes valium omeprazole 40 mg bid ,  phazyme and mirtzapine . the night sweats are getting worse with the summer. \par she tried soy milk.   she stopped black cohash bc of her stomach. \par She gets weak and tired. \par she does not have an air conditioner. she has fan on  and windows open. \par Her bp is ok 129/130   to  60-80  p 60-80\par urine is clear.\par she takes miralax for stomach. constipation. \par she has no cp . \par she has some shortness with the stairs. it does away.\par

## 2022-08-11 ENCOUNTER — APPOINTMENT (OUTPATIENT)
Dept: FAMILY MEDICINE | Facility: CLINIC | Age: 77
End: 2022-08-11

## 2022-08-11 VITALS — WEIGHT: 94 LBS | BODY MASS INDEX: 15.64 KG/M2

## 2022-08-11 PROCEDURE — 99442: CPT

## 2022-08-11 NOTE — PLAN
[FreeTextEntry1] : she was advised to increase intake of soy milk, she will remain off of the Bactrim, no medication changes were made and she will have a telephonic follow up appointment in 1 week

## 2022-08-11 NOTE — HISTORY OF PRESENT ILLNESS
[Home] : at home, [unfilled] , at the time of the visit. [Medical Office: ([facility])___] : at the medical office located in  [Verbal consent obtained from patient] : the patient, [unfilled] [FreeTextEntry1] : Pt presents for a follow up on the night sweats, sinus congestion and abdominal pain [de-identified] : The patient telephoned and requested a call back to discuss their health.  The visit was performed over the telephone as the patient was unable to be seen in the office due to the COVID 19 pandemic.  She started drinking the soy milk about 10 days ago and it hasn't helped so far.  She doesn't have air conditioning at home.  She is waking up several times per night but is able to fall back to sleep.  Her weight is 94-95 pounds and she is eating better.  She is taking the whole mirtazapine most nights, if she gets constipated she decreases it to 1/2 tab for a few nights.  She has been less achy and stopped the bactrim yesterday.\par

## 2022-08-17 ENCOUNTER — APPOINTMENT (OUTPATIENT)
Dept: FAMILY MEDICINE | Facility: CLINIC | Age: 77
End: 2022-08-17

## 2022-08-17 PROCEDURE — 99442: CPT

## 2022-08-17 NOTE — HISTORY OF PRESENT ILLNESS
[Home] : at home, [unfilled] , at the time of the visit. [Other Location: e.g. Home (Enter Location, City,State)___] : at [unfilled] [Verbal consent obtained from patient] : the patient, [unfilled] [FreeTextEntry1] : Pt presents for a follow up on her sinus pain and abdominal symptoms [de-identified] : The patient telephoned and requested a call back to discuss their health.  The visit was performed over the telephone as the patient was unable to be seen in the office due to the COVID 19 pandemic.  She reports that she just started taking Estroven for the night sweats.  A few nights in the past week the sweats were a little better.  She is having pain and pressure in her face and body aches and would like to renew the Bactrim.  She is eating fairly well and her weight is still 94-95 pounds.  She is asking if she needs a polio booster.  She was vaccinated as a child and received a booster around 1970.  She would like to renew diazepam, it helps with her anxiety.  Her BP was up to 150 for a few hours and she took lisinopril but it went back into the 120's systolic.\par

## 2022-08-17 NOTE — PLAN
[FreeTextEntry1] : she will take Bactrim as directed, I-STOP was checked and diazepam renewed, she will continue all medications without change, she was advised that at this time she doesn't require a polio booster, she was given emotional support and all of her questions were answered

## 2022-08-24 ENCOUNTER — APPOINTMENT (OUTPATIENT)
Dept: FAMILY MEDICINE | Facility: CLINIC | Age: 77
End: 2022-08-24

## 2022-08-24 PROCEDURE — 99441: CPT

## 2022-08-24 NOTE — PLAN
[FreeTextEntry1] : her conditions are stable and no medication changes were made, she will follow up in 1 week or sooner prn

## 2022-08-24 NOTE — ASSESSMENT
Managed by primary care provider   Severe obstructive sleep apnea AHI 45.5. CPAP 16 optimal.  Begin CPAP 16 cm   Full face mask  Continue follow up with primary care provider    [FreeTextEntry1] : she was escribed a 14 day course of bactrim with a renewal, she was given a new mammogram rx and she will do the stool FIT test and follow up if symptoms persist or worsen

## 2022-08-24 NOTE — HISTORY OF PRESENT ILLNESS
[Home] : at home, [unfilled] , at the time of the visit. [Medical Office: ([facility])___] : at the medical office located in  [Verbal consent obtained from patient] : the patient, [unfilled] [FreeTextEntry1] : Pt presents for a follow up on her body aches and abdominal symptoms [de-identified] : The patient telephoned and requested a call back to discuss their health.  The visit was performed over the telephone as the patient was unable to be seen in the office due to the COVID 19 pandemic.  She reports that she has been on Estroven for about 2 weeks but missed two doses and the night sweats haven't changed yet.  She is eating a little more protein and maintaining her weight at 94-95 pounds.  She was constipated and decreased the mirtazapine to 1/2 tab for 2 days then it resolved.  Her BP is in the 110-120/60-70 range and her heart rate is 70-80.  The body aches have improved with the Bactrim\par

## 2022-08-27 NOTE — HISTORY OF PRESENT ILLNESS
[___ Weeks ago] :  [unfilled] weeks ago [FreeTextEntry8] : By thursday of last week she felt much worse, she had a very hoarse voice and was very weak and achy.  She had a lot of sinus pain and pressure and her throat feels "hot" and she has postnasal drip.  On friday she stopped the cefdinir and went back on the bactrim and is now starting to feel better.  She had a large BM today and feels better.  She has a lack of appetite but is forcing herself to eat. Walk in Auto. assisted by ED

## 2022-08-31 ENCOUNTER — APPOINTMENT (OUTPATIENT)
Dept: FAMILY MEDICINE | Facility: CLINIC | Age: 77
End: 2022-08-31

## 2022-08-31 PROCEDURE — 99442: CPT

## 2022-08-31 NOTE — PLAN
[FreeTextEntry1] : she declined having lab testing at this time but we will discuss it further next week, she was advised to continue the Estroven and to add evening primrose oil, we discussed whether mirtazapine was causing worsening and it is possible but it has been beneficial to her regarding her appetite and maintaining her weight, she will continue Bactrim for now

## 2022-08-31 NOTE — HISTORY OF PRESENT ILLNESS
[Home] : at home, [unfilled] , at the time of the visit. [Medical Office: ([facility])___] : at the medical office located in  [Verbal consent obtained from patient] : the patient, [unfilled] [FreeTextEntry1] : Pt presents for a follow up on her night sweats and her abdominal symptoms. [de-identified] : The patient telephoned and requested a call back to discuss their health.  The visit was performed over the telephone as the patient was unable to be seen in the office due to the COVID 19 pandemic.  She is still being awoken nightly with sweating.  She reports having them since the 1980's but they have gotten a little worse.  Her weight is 95-96 pounds.  She is eating better and otherwise feeling well.  Her blood pressure has been in the normal range.\par

## 2022-09-07 ENCOUNTER — APPOINTMENT (OUTPATIENT)
Dept: FAMILY MEDICINE | Facility: CLINIC | Age: 77
End: 2022-09-07

## 2022-09-07 VITALS — BODY MASS INDEX: 15.98 KG/M2 | WEIGHT: 96 LBS

## 2022-09-07 DIAGNOSIS — H10.89 OTHER CONJUNCTIVITIS: ICD-10-CM

## 2022-09-07 PROCEDURE — 99441: CPT

## 2022-09-07 RX ORDER — MULTIVITAMIN
TABLET ORAL
Refills: 0 | Status: ACTIVE | COMMUNITY

## 2022-09-07 NOTE — PLAN
[FreeTextEntry1] : her conditions are stable and no medication changes were made, she will follow up via telephone in 1 week

## 2022-09-07 NOTE — HISTORY OF PRESENT ILLNESS
[Home] : at home, [unfilled] , at the time of the visit. [Medical Office: ([facility])___] : at the medical office located in  [Verbal consent obtained from patient] : the patient, [unfilled] [FreeTextEntry1] : Pt presents for a follow up on the night sweats and abdominal pain and constipation [de-identified] : The patient telephoned and requested a call back to discuss their health.  The visit was performed over the telephone as the patient was unable to be seen in the office due to the COVID 19 pandemic.  She hasn't started the evening primrose yet but the night sweats have been a little better.  She is eating better and still gets a little constipated on and off.  Her weight is 96 pounds.  She is planning to stop the Bactrim soon.  Her BP is in the 110-120/70 range.  She would like to postpone scheduling the lab testing until next week.  She is going out now more often for walks and is speaking with her neighbors again and saw her family\par

## 2022-09-14 ENCOUNTER — APPOINTMENT (OUTPATIENT)
Dept: FAMILY MEDICINE | Facility: CLINIC | Age: 77
End: 2022-09-14

## 2022-09-14 PROCEDURE — 99442: CPT

## 2022-09-14 NOTE — HISTORY OF PRESENT ILLNESS
[Home] : at home, [unfilled] , at the time of the visit. [Medical Office: ([facility])___] : at the medical office located in  [Verbal consent obtained from patient] : the patient, [unfilled] [FreeTextEntry1] : Pt presents for a follow up on her weight and the night sweats [de-identified] : The patient telephoned and requested a call back to discuss their health.  The visit was performed over the telephone as the patient was unable to be seen in the office due to the COVID 19 pandemic.  She is taking 1-2 capsules of the evening primrose oil nightly and feels slight improvement in the night sweats.  She is eating better and drinking muscle milk daily and is maintaining her weight at 96 pounds.  She doesn't want to do the lab testing yet but will discuss it again next week.  The abdominal pain and bloating are better.  She is getting out of the apartment more often and has increased her physical activity.  She is still on the bactrim but plans to stop it at the end of the week.\par

## 2022-09-14 NOTE — PLAN
[FreeTextEntry1] : her conditions are stable and no medication changes were made, I agree that it is time to stop the Bactrim, she will take 2 evening primrose capsules nightly and will follow up via telephone in 1 week

## 2022-09-21 ENCOUNTER — APPOINTMENT (OUTPATIENT)
Dept: FAMILY MEDICINE | Facility: CLINIC | Age: 77
End: 2022-09-21

## 2022-09-21 PROCEDURE — 99442: CPT

## 2022-09-21 NOTE — PLAN
[FreeTextEntry1] : she was advised to continue Bactrim and to monitor her symptoms and to call me if they worsen, I-STOP was checked and diazepam renewed, she will continue to monitor her BP at home and will take the lisinopril as needed

## 2022-09-21 NOTE — HISTORY OF PRESENT ILLNESS
[Home] : at home, [unfilled] , at the time of the visit. [Medical Office: ([facility])___] : at the medical office located in  [Verbal consent obtained from patient] : the patient, [unfilled] [FreeTextEntry1] : Pt presents for a follow up on her weight loss and new symptoms. [de-identified] : The patient telephoned and requested a call back to discuss their health.  The visit was performed over the telephone as the patient was unable to be seen in the office due to the COVID 19 pandemic.  Since yesterday she has had sinus pain on the right side with fatigue and a headache and body aches.  She feels fatigued and has a hoarse voice.  She resumed the Bactrim yesterday and feels a little better today.  Her BP yesterday was 146/76 and she took lisinopril and it went down.  When she woke up this morning it was 129/72 but then it went up to 143/76 and took another lisinopril.  The night sweats are a little better and she is maintaining her weight at 96-97 pounds.\par

## 2022-09-28 ENCOUNTER — APPOINTMENT (OUTPATIENT)
Dept: FAMILY MEDICINE | Facility: CLINIC | Age: 77
End: 2022-09-28

## 2022-09-28 PROCEDURE — 99442: CPT

## 2022-09-28 NOTE — PLAN
[FreeTextEntry1] : she declined scheduling the lab testing for now, her conditions are stable and no medication changes were made, she will continue to do brain stimulating activities and monitor for any further memory concerns

## 2022-09-28 NOTE — HISTORY OF PRESENT ILLNESS
[Home] : at home, [unfilled] , at the time of the visit. [Medical Office: ([facility])___] : at the medical office located in  [Verbal consent obtained from patient] : the patient, [unfilled] [FreeTextEntry1] : Pt presents for a follow up on her sinus infection and night sweats. [de-identified] : The patient telephoned and requested a call back to discuss their health.  The visit was performed over the telephone as the patient was unable to be seen in the office due to the COVID 19 pandemic.  She is feeling improvement in her sinus symptoms and will continue the Bactrim for now.  Her bowel movements are normal.  Her weight is 97 pounds.  She is having continued improvement with the night sweats.  Her BP is in the 110-130/60 range.  She did have a moment when she was forgetful and it lasted for about a minute. She watches quiz shows to keep her memory sharp.\par

## 2022-10-05 ENCOUNTER — APPOINTMENT (OUTPATIENT)
Dept: FAMILY MEDICINE | Facility: CLINIC | Age: 77
End: 2022-10-05

## 2022-10-05 VITALS — BODY MASS INDEX: 16.47 KG/M2 | WEIGHT: 99 LBS

## 2022-10-05 PROCEDURE — 99442: CPT

## 2022-10-05 NOTE — HISTORY OF PRESENT ILLNESS
[Home] : at home, [unfilled] , at the time of the visit. [Medical Office: ([facility])___] : at the medical office located in  [Verbal consent obtained from patient] : the patient, [unfilled] [FreeTextEntry1] : Pt presents for a follow up on weight loss and sinus congestion. [de-identified] : The patient telephoned and requested a call back to discuss their health.  The visit was performed over the telephone as the patient was unable to be seen in the office due to the COVID 19 pandemic.  The night sweats are a little better. She weighs 99 pounds!!!  She is eating better.  She is still on the Bactrim and it is helping with her sinus infection.  Her BP is in the 110/60 range and her heart rate in the 70's\par

## 2022-10-05 NOTE — PLAN
[FreeTextEntry1] : her conditions are stable and no medication changes were made, she will continue to increase caloric intake as tolerated and will follow up via telephone in 1 week

## 2022-10-10 ENCOUNTER — LABORATORY RESULT (OUTPATIENT)
Age: 77
End: 2022-10-10

## 2022-10-12 ENCOUNTER — APPOINTMENT (OUTPATIENT)
Dept: FAMILY MEDICINE | Facility: CLINIC | Age: 77
End: 2022-10-12

## 2022-10-12 PROCEDURE — 99442: CPT

## 2022-10-12 NOTE — HISTORY OF PRESENT ILLNESS
[Home] : at home, [unfilled] , at the time of the visit. [Medical Office: ([facility])___] : at the medical office located in  [Verbal consent obtained from patient] : the patient, [unfilled] [FreeTextEntry1] : Pt presents for a follow up on the sinus infection and her weakness. [de-identified] : The patient telephoned and requested a call back to discuss their health.  The visit was performed over the telephone as the patient was unable to be seen in the office due to the COVID 19 pandemic.  She still has a sore throat and headache and body aches but they are slowly improving.  The night sweats are a little better.  She also wants to discuss the lab testing again. She wasn't fasting for the testing.  Her BP has been OK, it was a little high today and she took a lisinopril\par

## 2022-10-12 NOTE — PLAN
[FreeTextEntry1] : we reviewed the lab report again and she will decrease intake of donuts and butter and other high carb and cholesterol foods, she may continue to eat up to 6 eggs per week, she will continue bactrim and will start prednisone and follow up via phone in 1 week or sooner prn

## 2022-10-19 ENCOUNTER — APPOINTMENT (OUTPATIENT)
Dept: FAMILY MEDICINE | Facility: CLINIC | Age: 77
End: 2022-10-19

## 2022-10-19 DIAGNOSIS — R73.09 OTHER ABNORMAL GLUCOSE: ICD-10-CM

## 2022-10-19 PROCEDURE — 99442: CPT

## 2022-10-19 NOTE — HISTORY OF PRESENT ILLNESS
[Home] : at home, [unfilled] , at the time of the visit. [Medical Office: ([facility])___] : at the medical office located in  [Verbal consent obtained from patient] : the patient, [unfilled] [FreeTextEntry1] : Pt presents for a follow up on the night sweats and the sinus infection. [de-identified] : The patient telephoned and requested a call back to discuss their health.  The visit was performed over the telephone as the patient was unable to be seen in the office due to the COVID 19 pandemic.  She reports that the sinus symptoms are getting better slowly.  Her BP has been in the 120/130 range systolic for the most part, she only needed 2 doses of lisinopril for SBP of greater than 140.  Her weight is  pounds.  She has cut down on her sweets and baked goods.  The night sweats are getting slowly better. She has been taking the prednisone and has 3 pills left, she had forgotten to take a few of them.  She reports that her bowel movements are normal and she is taking 1 mirtazapine at night.\par

## 2022-10-19 NOTE — PLAN
[FreeTextEntry1] : Her conditions are stable and continue to improve, I renewed the antibiotic for the lingering sinus infection, she will continue to follow the healthier lower carb and lower sugar and lower cholesterol diet and we will talk again in 1 week

## 2022-10-26 ENCOUNTER — APPOINTMENT (OUTPATIENT)
Dept: FAMILY MEDICINE | Facility: CLINIC | Age: 77
End: 2022-10-26

## 2022-10-26 VITALS — BODY MASS INDEX: 16.14 KG/M2 | WEIGHT: 97 LBS

## 2022-10-26 PROCEDURE — 99442: CPT

## 2022-10-26 NOTE — PLAN
[FreeTextEntry1] : she was given a lot of emotional support throughout the visit, she will continue current tx and she will get the nasal swab done and will be called with results, I-STOP was checked and diazepam renewed, she will follow up in 1 week or sooner prn

## 2022-10-26 NOTE — HISTORY OF PRESENT ILLNESS
[Home] : at home, [unfilled] , at the time of the visit. [Medical Office: ([facility])___] : at the medical office located in  [Verbal consent obtained from patient] : the patient, [unfilled] [FreeTextEntry1] : Pt presents for a follow up on the sinus infection and her anxiety [de-identified] : The patient telephoned and requested a call back to discuss their health.  The visit was performed over the telephone as the patient was unable to be seen in the office due to the COVID 19 pandemic.  She had a lot of sweats last night but was a little less achy.  Today she still has the sinus headache and the body aches.  She isn't eating well and lost 2 pounds.  She would like to renew the diazepam.  Her blood pressure this morning was 137/70, she didn't take the lisinopril and it came down to 105/62\par

## 2022-10-27 ENCOUNTER — LABORATORY RESULT (OUTPATIENT)
Age: 77
End: 2022-10-27

## 2022-10-29 ENCOUNTER — RX RENEWAL (OUTPATIENT)
Age: 77
End: 2022-10-29

## 2022-10-31 ENCOUNTER — LABORATORY RESULT (OUTPATIENT)
Age: 77
End: 2022-10-31

## 2022-11-01 ENCOUNTER — LABORATORY RESULT (OUTPATIENT)
Age: 77
End: 2022-11-01

## 2022-11-02 ENCOUNTER — APPOINTMENT (OUTPATIENT)
Dept: FAMILY MEDICINE | Facility: CLINIC | Age: 77
End: 2022-11-02

## 2022-11-02 VITALS — BODY MASS INDEX: 15.98 KG/M2 | WEIGHT: 96 LBS

## 2022-11-02 PROCEDURE — 99442: CPT

## 2022-11-02 NOTE — PLAN
[FreeTextEntry1] : she is slowly improving, I renewed cefdinir and she will take another round of prednisone, will continue higher protein diet and medications, she was given emotional support and will follow up telephonically in 1 week or sooner prn

## 2022-11-02 NOTE — HISTORY OF PRESENT ILLNESS
[Home] : at home, [unfilled] , at the time of the visit. [Medical Office: ([facility])___] : at the medical office located in  [Verbal consent obtained from patient] : the patient, [unfilled] [FreeTextEntry1] : Pt presents for a follow up on her sinus infection and weakness. [de-identified] : The patient telephoned and requested a call back to discuss their health.  The visit was performed over the telephone as the patient was unable to be seen in the office due to the COVID 19 pandemic.  She has had a few better days but still feels somewhat weak and achy.  She has a little yellow mucous from her nose and would like to start prednisone.  She is eating a little more protein and her weight is 96 pounds.  Her blood pressure is better.  Her night sweats are about the same\par

## 2022-11-03 ENCOUNTER — NON-APPOINTMENT (OUTPATIENT)
Age: 77
End: 2022-11-03

## 2022-11-08 ENCOUNTER — NON-APPOINTMENT (OUTPATIENT)
Age: 77
End: 2022-11-08

## 2022-11-09 ENCOUNTER — APPOINTMENT (OUTPATIENT)
Dept: FAMILY MEDICINE | Facility: CLINIC | Age: 77
End: 2022-11-09

## 2022-11-09 PROCEDURE — 99442: CPT

## 2022-11-09 NOTE — PLAN
[FreeTextEntry1] : she was advised to continue current treatment and to follow up next week or sooner if symptoms worsen, she was given emotional support

## 2022-11-09 NOTE — HISTORY OF PRESENT ILLNESS
[Home] : at home, [unfilled] , at the time of the visit. [Medical Office: ([facility])___] : at the medical office located in  [Verbal consent obtained from patient] : the patient, [unfilled] [FreeTextEntry1] : Pt presents for a follow up on her sinus infection and night sweats. [de-identified] : The patient telephoned and requested a call back to discuss their health.  The visit was performed over the telephone as the patient was unable to be seen in the office due to the COVID 19 pandemic.  She had a bad weekend with the sinus headaches and weakness but feels a little better since last night.  She is finishing up the prednisone and is on both antibiotics.  The night sweats were worse over the weekend and a little better last night.  She is drinking the Muscle Milk with iron, it is causing a little constipation but she is managing it with Miralax.  Her BP was up to 150 systolic today and she took a lisinopril and it went down to 120.  Her weight is still 96 pounds\par

## 2022-11-16 ENCOUNTER — APPOINTMENT (OUTPATIENT)
Dept: FAMILY MEDICINE | Facility: CLINIC | Age: 77
End: 2022-11-16

## 2022-11-16 PROCEDURE — 99441: CPT

## 2022-11-16 NOTE — PLAN
[FreeTextEntry1] : her conditions are stable and no medication changes are needed, I renewed the bactrim and she will follow up in 1 week or sooner prn

## 2022-11-16 NOTE — HISTORY OF PRESENT ILLNESS
[Home] : at home, [unfilled] , at the time of the visit. [Medical Office: ([facility])___] : at the medical office located in  [Verbal consent obtained from patient] : the patient, [unfilled] [FreeTextEntry1] : Pt presents for a follow up on her night sweats and weakness. [de-identified] : The patient telephoned and requested a call back to discuss their health.  The visit was performed over the telephone as the patient was unable to be seen in the office due to the COVID 19 pandemic.  She reports that she is slowly improving, she did feel weak and achy yesterday but feels better today.  The night sweats are the same, she is taking the black cohosh, evening primrose oil and drinking soy milk.  Her BP is in the 120-130/60-70 range and her weight is still 96 pounds\par

## 2022-11-23 ENCOUNTER — APPOINTMENT (OUTPATIENT)
Dept: FAMILY MEDICINE | Facility: CLINIC | Age: 77
End: 2022-11-23

## 2022-11-23 PROCEDURE — 99442: CPT

## 2022-11-23 NOTE — PLAN
[FreeTextEntry1] : she declined an increase in the mirtazapine dose, she was given significant emotional support and was advised to schedule the echo appointment, no medication changes were made and she will follow up in 6 days for a telephonic visit

## 2022-11-23 NOTE — HISTORY OF PRESENT ILLNESS
[Home] : at home, [unfilled] , at the time of the visit. [Medical Office: ([facility])___] : at the medical office located in  [Verbal consent obtained from patient] : the patient, [unfilled] [FreeTextEntry1] : Pt presents for a follow up on her sinus infection and weakness. [de-identified] : The patient telephoned and requested a call back to discuss their health.  The visit was performed over the telephone as the patient was unable to be seen in the office due to the COVID 19 pandemic.  She reports that the headaches have improved but she still feels a little weak.  She is overdue for her Echo but is worried about going into the cardiology office due to Covid.  She is down to 94 pounds and feels a little down.  Her BP is in the 120/60-70 range and her pulse is 70-80.\par

## 2022-11-29 ENCOUNTER — APPOINTMENT (OUTPATIENT)
Dept: FAMILY MEDICINE | Facility: CLINIC | Age: 77
End: 2022-11-29

## 2022-11-29 PROCEDURE — 99442: CPT

## 2022-11-29 RX ORDER — CEFDINIR 300 MG/1
300 CAPSULE ORAL
Refills: 0 | Status: DISCONTINUED | COMMUNITY
End: 2022-11-29

## 2022-11-29 RX ORDER — CEFDINIR 300 MG/1
300 CAPSULE ORAL
Qty: 28 | Refills: 1 | Status: DISCONTINUED | COMMUNITY
Start: 2022-11-02 | End: 2022-11-29

## 2022-11-29 NOTE — PLAN
[FreeTextEntry1] : her conditions are stable and no medication changes were made, I-STOP was checked and medications renewed, she will follow up via telephone on 12/9 and will call sooner for any concerns

## 2022-11-29 NOTE — HISTORY OF PRESENT ILLNESS
[Home] : at home, [unfilled] , at the time of the visit. [Medical Office: ([facility])___] : at the medical office located in  [Verbal consent obtained from patient] : the patient, [unfilled] [FreeTextEntry1] : Pt presents for a follow up on her weakness and sinus infection [de-identified] : The patient telephoned and requested a call back to discuss their health.  The visit was performed over the telephone as the patient was unable to be seen in the office due to the COVID 19 pandemic.  She reports that she was very achy when it rained on Sunday but is a little better now.  She is a little dizzy on and off and when her sinuses drain it gets better.  She has had more intestinal gas lately and she was a little constipated but that's better.  She stopped taking the Cefdinir due to stomach upset.  Her current weight is 95 pounds.  Her BP is in the 110-120/60-70 range.\par

## 2022-12-09 ENCOUNTER — APPOINTMENT (OUTPATIENT)
Dept: FAMILY MEDICINE | Facility: CLINIC | Age: 77
End: 2022-12-09

## 2022-12-09 VITALS — BODY MASS INDEX: 15.81 KG/M2 | WEIGHT: 95 LBS

## 2022-12-09 PROCEDURE — 99441: CPT

## 2022-12-09 NOTE — HISTORY OF PRESENT ILLNESS
[Home] : at home, [unfilled] , at the time of the visit. [Medical Office: ([facility])___] : at the medical office located in  [Verbal consent obtained from patient] : the patient, [unfilled] [FreeTextEntry1] : Pt presents for a follow up on her sinus infection. [de-identified] : The patient telephoned and requested a call back to discuss their health.  The visit was performed over the telephone as the patient was unable to be seen in the office due to the COVID 19 pandemic.  She reports that she is slowly getting her strength back.  She only has very mild headaches and body aches.  She is forgetting to take the evening primrose oil and black cohosh and is still getting the night sweats.  Her weight is 95 pounds.\par

## 2022-12-09 NOTE — PLAN
[FreeTextEntry1] : her conditions are stable and no medication changes were made, she will follow up in 5 days or sooner prn

## 2022-12-14 ENCOUNTER — APPOINTMENT (OUTPATIENT)
Dept: FAMILY MEDICINE | Facility: CLINIC | Age: 77
End: 2022-12-14

## 2022-12-14 PROCEDURE — 99442: CPT

## 2022-12-14 NOTE — HISTORY OF PRESENT ILLNESS
[Home] : at home, [unfilled] , at the time of the visit. [Medical Office: ([facility])___] : at the medical office located in  [Verbal consent obtained from patient] : the patient, [unfilled] [FreeTextEntry1] : Pt presents for a follow up on the night sweats, her sinus infection and weakness [de-identified] : The patient telephoned and requested a call back to discuss their health.  The visit was performed over the telephone as the patient was unable to be seen in the office due to the COVID 19 pandemic.  She reports that the blood pressure has been fluctuating from the  range systolic.  She is taking black cohosh and drinking soy milk and will get some more evening primrose oil.  Her weight is still 95 pounds.  The night sweats are a little better.\par

## 2022-12-14 NOTE — PLAN
[FreeTextEntry1] : her conditions are stable and no medication changes were made, Bactrim was renewed, she will schedule a follow up visit with her cardiologist for January, she will continue healthy diet encorporating protein

## 2022-12-21 ENCOUNTER — APPOINTMENT (OUTPATIENT)
Dept: FAMILY MEDICINE | Facility: CLINIC | Age: 77
End: 2022-12-21

## 2022-12-21 PROCEDURE — 99442: CPT

## 2022-12-21 NOTE — HISTORY OF PRESENT ILLNESS
[Home] : at home, [unfilled] , at the time of the visit. [Medical Office: ([facility])___] : at the medical office located in  [Verbal consent obtained from patient] : the patient, [unfilled] [FreeTextEntry1] : Pt presents for a follow up on her weakness and sinus infection [de-identified] : The patient telephoned and requested a call back to discuss their health.  The visit was performed over the telephone as the patient was unable to be seen in the office due to the COVID 19 pandemic.  She still has a little body aches, weakness and muscle aches but they come and go.  She had pain in her shins but it resolved with ibuprofen and heat.  She is being a little more active.  She does get a little short of breath with activity.  She is scheduled to see the cardiologist on 1/13.  She still weighs 95 pounds.  She is getting fewer night sweats\par

## 2022-12-21 NOTE — PLAN
[FreeTextEntry1] : her conditions are stable, she will continue healthy diet with more protein and current medications and will follow up in 1 week or sooner prn

## 2022-12-28 ENCOUNTER — APPOINTMENT (OUTPATIENT)
Dept: FAMILY MEDICINE | Facility: CLINIC | Age: 77
End: 2022-12-28

## 2022-12-28 PROCEDURE — 99442: CPT

## 2022-12-28 RX ORDER — BEPOTASTINE BESILATE 15 MG/ML
1.5 SOLUTION/ DROPS OPHTHALMIC DAILY
Qty: 1 | Refills: 3 | Status: ACTIVE | COMMUNITY
Start: 2022-12-28 | End: 1900-01-01

## 2022-12-28 NOTE — HISTORY OF PRESENT ILLNESS
[Home] : at home, [unfilled] , at the time of the visit. [Medical Office: ([facility])___] : at the medical office located in  [Verbal consent obtained from patient] : the patient, [unfilled] [FreeTextEntry1] : Pt presents for a follow up on her sinus infection, night sweats and abdominal symptoms.  She reports that the night sweats are improving. [de-identified] : The patient telephoned and requested a call back to discuss their health.  The visit was performed over the telephone as the patient was unable to be seen in the office due to the COVID 19 pandemic.  She reports that the night sweats are getting better.  She hasn't had any headaches.  Her weight is 94-95 pounds.  She still has some muscle aches and weakness in her legs.  Her eyes are itchy.  Her BP is in the 110-130/60 range.  She feels less short of breath with exertion\par

## 2022-12-28 NOTE — PLAN
[FreeTextEntry1] : her conditions are stable, she will start the eye drops and no other medication changes were made, she was given emotional support and encouragement and she will follow up in 1 week via telephone or sooner prn

## 2023-01-04 ENCOUNTER — APPOINTMENT (OUTPATIENT)
Dept: FAMILY MEDICINE | Facility: CLINIC | Age: 78
End: 2023-01-04
Payer: MEDICARE

## 2023-01-04 PROCEDURE — 99442: CPT

## 2023-01-04 NOTE — PLAN
[FreeTextEntry1] : her conditions are stable and no medication changes are needed, I-STOP was checked and diazepam renewed, she was given emotional support and will follow up telephonically in 1 week

## 2023-01-04 NOTE — HISTORY OF PRESENT ILLNESS
[Home] : at home, [unfilled] , at the time of the visit. [Medical Office: ([facility])___] : at the medical office located in  [Verbal consent obtained from patient] : the patient, [unfilled] [FreeTextEntry1] : Pt presents for a follow up on her blood pressure, abdominal pain and sinus infection [de-identified] : The patient telephoned and requested a call back to discuss their health.  The visit was performed over the telephone as the patient was unable to be seen in the office due to the COVID 19 pandemic.  She reports that her abdomen bothered her for a few days but it is better now.  She still has the sinus pain and pressure and the body aches on and off.  The night sweats are a little better.  Her weight is 95 pounds and the BP is in the 104-140/60-70 range and the pulse is 60-70.  She has a cardiology appointment on 1/13\par

## 2023-01-11 ENCOUNTER — APPOINTMENT (OUTPATIENT)
Dept: FAMILY MEDICINE | Facility: CLINIC | Age: 78
End: 2023-01-11
Payer: MEDICARE

## 2023-01-11 PROCEDURE — 99441: CPT

## 2023-01-11 NOTE — PLAN
[FreeTextEntry1] : Her conditions are stable and no medication changes were telephonic visit in 1 week or sooner if needed at this time, she will follow-up with cardiology as scheduled and she will follow-up with me for a telephonic visit in 1 week or sooner prn

## 2023-01-11 NOTE — HISTORY OF PRESENT ILLNESS
[Home] : at home, [unfilled] , at the time of the visit. [Medical Office: ([facility])___] : at the medical office located in  [Verbal consent obtained from patient] : the patient, [unfilled] [FreeTextEntry1] : Pt presents for a follow up on her weight and sinus symptoms [de-identified] : The patient telephoned and requested a call back to discuss their health.  The visit was performed over the telephone as the patient was unable to be seen in the office due to the COVID 19 pandemic.  She reports that her weight is still 95 pounds although she does have a good appetite and has been trying to eat more.  She still feels somewhat weak and tired but it is slowly improving.  She stopped the Bactrim for 3 days but then had worsening sinus symptoms and resumed taking it.  She is still having the night sweats but they have improved and she is scheduled to see the cardiologist on Friday for an echo and an office visit.  She is still monitoring her blood pressure regularly and it is usually in the normal range.\par

## 2023-01-18 ENCOUNTER — APPOINTMENT (OUTPATIENT)
Dept: FAMILY MEDICINE | Facility: CLINIC | Age: 78
End: 2023-01-18
Payer: MEDICARE

## 2023-01-18 PROCEDURE — 99442: CPT

## 2023-01-18 NOTE — PLAN
[FreeTextEntry1] : the echo report was reviewed, her conditions are stable and no medication changes were made, medications were renewed and she will follow up telephonically in 1 week or sooner prn, she was very anxious and was given a lot of emotional support

## 2023-01-18 NOTE — HISTORY OF PRESENT ILLNESS
[Home] : at home, [unfilled] , at the time of the visit. [Medical Office: ([facility])___] : at the medical office located in  [Verbal consent obtained from patient] : the patient, [unfilled] [FreeTextEntry1] : Pt presents for a follow up on the sinus infection and her weakness. [de-identified] : The patient telephoned and requested a call back to discuss their health.  The visit was performed over the telephone as the patient was unable to be seen in the office due to the COVID 19 pandemic.  She had the echo done and is scheduled for the stress test on 2/15.  Her weight is still 95 pounds.  She still feels weak and tired but is able to do some housework but has to rest after.  Her BP is mostly in the 110-120 range systolic, it was high twice and she took lisinopril.  Last night she was able to sleep for 6 hours without waking up with the sweats. \par

## 2023-01-25 ENCOUNTER — APPOINTMENT (OUTPATIENT)
Dept: FAMILY MEDICINE | Facility: CLINIC | Age: 78
End: 2023-01-25
Payer: MEDICARE

## 2023-01-25 PROCEDURE — 99442: CPT

## 2023-01-25 NOTE — HISTORY OF PRESENT ILLNESS
[Home] : at home, [unfilled] , at the time of the visit. [Medical Office: ([facility])___] : at the medical office located in  [Verbal consent obtained from patient] : the patient, [unfilled] [FreeTextEntry1] : Pt presents for a follow up on her sinus infection and weight. [de-identified] : The patient telephoned and requested a call back to discuss their health.  The visit was performed over the telephone as the patient was unable to be seen in the office due to the COVID 19 pandemic.  She reports that the body aches have been a little better, she still feels weak on and off.  Her recent BP readings are normal and her weight is still 95 pounds.  She sinus headaches are a little better and so are the night sweats.  \par

## 2023-01-25 NOTE — PLAN
[FreeTextEntry1] : her conditions are stable and no medication changes were made, she will follow up telephonically in 1 week or sooner prn and she was given emotional support

## 2023-02-01 ENCOUNTER — APPOINTMENT (OUTPATIENT)
Dept: FAMILY MEDICINE | Facility: CLINIC | Age: 78
End: 2023-02-01
Payer: MEDICARE

## 2023-02-01 VITALS — BODY MASS INDEX: 15.98 KG/M2 | WEIGHT: 96 LBS

## 2023-02-01 PROCEDURE — 99442: CPT

## 2023-02-01 NOTE — PLAN
[FreeTextEntry1] : her conditions are stable and no medication changes were made, she will continue healthy diet and will follow up in 1 week via telephone or sooner prn

## 2023-02-01 NOTE — HISTORY OF PRESENT ILLNESS
[Home] : at home, [unfilled] , at the time of the visit. [Medical Office: ([facility])___] : at the medical office located in  [Verbal consent obtained from patient] : the patient, [unfilled] [FreeTextEntry1] : Pt presents for a follow up on her night sweats and her weight. [de-identified] : The patient telephoned and requested a call back to discuss their health.  The visit was performed over the telephone as the patient was unable to be seen in the office due to the COVID 19 pandemic.  She just started the evening primrose oil and the night sweats were worse for the past few days.  She is concerned regarding her cholesterol.  She doesn't want to have the blood work repeated until after the stress test is done.  Her current weight is 96 pounds.  She feels better regarding the sinus symptoms.  She still feels weak on and off  but has been a little more active.  Her recent BP readings are in the 110/60 range.  She is scheduled for the stress test on 2/15\par

## 2023-02-08 ENCOUNTER — APPOINTMENT (OUTPATIENT)
Dept: FAMILY MEDICINE | Facility: CLINIC | Age: 78
End: 2023-02-08
Payer: MEDICARE

## 2023-02-08 DIAGNOSIS — R14.0 ABDOMINAL DISTENSION (GASEOUS): ICD-10-CM

## 2023-02-08 PROCEDURE — 99442: CPT

## 2023-02-08 NOTE — PLAN
[FreeTextEntry1] : I-STOP was checked and diazepam renewed, no medication changes were made and she will follow up next week or sooner prn

## 2023-02-08 NOTE — HISTORY OF PRESENT ILLNESS
[Home] : at home, [unfilled] , at the time of the visit. [Medical Office: ([facility])___] : at the medical office located in  [Verbal consent obtained from patient] : the patient, [unfilled] [FreeTextEntry1] : Pt presents for a follow up on her sinuses and her weight  [de-identified] : The patient telephoned and requested a call back to discuss their health.  The visit was performed over the telephone as the patient was unable to be seen in the office due to the COVID 19 pandemic.  Her weight is still 96 pounds.  Her legs are a little achy in the morning but then get better.  Her BP has been fluctuating a little, she took the lisinopril once in the past week.  She had less sweats last night.  She is taking her supplements daily.  She would like to renew diazepam today.  She is feeling anxious about next week's stress test\par

## 2023-02-14 ENCOUNTER — APPOINTMENT (OUTPATIENT)
Dept: FAMILY MEDICINE | Facility: CLINIC | Age: 78
End: 2023-02-14
Payer: MEDICARE

## 2023-02-14 PROCEDURE — 99441: CPT

## 2023-02-14 NOTE — PLAN
[FreeTextEntry1] : I renewed the antibiotics and she will follow up via telephone in 1 week or sooner prn

## 2023-02-14 NOTE — HISTORY OF PRESENT ILLNESS
[Home] : at home, [unfilled] , at the time of the visit. [Medical Office: ([facility])___] : at the medical office located in  [Verbal consent obtained from patient] : the patient, [unfilled] [FreeTextEntry1] : Pt presents for urinary symptoms. [de-identified] : The patient telephoned and requested a call back to discuss their health.  The visit was performed over the telephone as the patient was unable to be seen in the office due to the COVID 19 pandemic.  For the past few days she has pressure in the pelvic area with sharp pain and has been urinating more often.  She has some postnasal drip and a scratchy throat.  She resumed the Cefdinir and Bactrim and it is helping.  Her weight is 96-97 pounds and her BP is normal. She is scheduled for the stress test tomorrow.  \par

## 2023-02-22 ENCOUNTER — APPOINTMENT (OUTPATIENT)
Dept: FAMILY MEDICINE | Facility: CLINIC | Age: 78
End: 2023-02-22
Payer: MEDICARE

## 2023-02-22 PROCEDURE — 99441: CPT

## 2023-02-22 NOTE — HISTORY OF PRESENT ILLNESS
[Home] : at home, [unfilled] , at the time of the visit. [Medical Office: ([facility])___] : at the medical office located in  [Verbal consent obtained from patient] : the patient, [unfilled] [FreeTextEntry1] : Pt presents for a follow up on the heart testing, sinus symptoms and weight loss. [de-identified] : The patient telephoned and requested a call back to discuss their health.  The visit was performed over the telephone as the patient was unable to be seen in the office due to the COVID 19 pandemic.  She was relieved to hear that the stress test was normal.  Her weight in 96-97 pounds and she is eating better.  She is sleeping better and the sweats aren't as severe.  Her blood pressure and heart rate are normal. She plans to stop taking the Bactrim in about a week.\par

## 2023-02-22 NOTE — PLAN
[FreeTextEntry1] : her conditions are stable and no medication changes were made, she will continue efforts to gain weight and will follow up telephonically in 1 week, she is declining a lab order at this time but will be ready in a few weeks for it

## 2023-03-01 ENCOUNTER — APPOINTMENT (OUTPATIENT)
Dept: FAMILY MEDICINE | Facility: CLINIC | Age: 78
End: 2023-03-01
Payer: MEDICARE

## 2023-03-01 VITALS — BODY MASS INDEX: 16.14 KG/M2 | WEIGHT: 97 LBS

## 2023-03-01 PROCEDURE — 99442: CPT

## 2023-03-01 NOTE — PLAN
[FreeTextEntry1] : she was advised to purchase a new BP monitor and to have something to eat and to stay well hydrated, she wants to stay on the Bactrim and Cefdinir for another week, she was given a lot of emotional support today and will follow up telephonically in 1 week and will call me back this afternoon if she doesn't feel better

## 2023-03-01 NOTE — HISTORY OF PRESENT ILLNESS
[Home] : at home, [unfilled] , at the time of the visit. [Medical Office: ([facility])___] : at the medical office located in  [Verbal consent obtained from patient] : the patient, [unfilled] [FreeTextEntry1] : Pt presents for a follow up on her blood pressure and sinus symptoms. [de-identified] : The patient telephoned and requested a call back to discuss their health.  The visit was performed over the telephone as the patient was unable to be seen in the office due to the COVID 19 pandemic.  She reports that she was feeling well and this morning she awoke and felt dizzy and achy and checked her BP and it was 153/79 and she took a lisinopril and now she can't get her BP monitor to work and she is very nervous.  She hasn't had anything to eat today yet.  Her weight is 97 pounds and most of her BP readings are in the 120/70 range\par

## 2023-03-03 ENCOUNTER — LABORATORY RESULT (OUTPATIENT)
Age: 78
End: 2023-03-03

## 2023-03-08 ENCOUNTER — APPOINTMENT (OUTPATIENT)
Dept: FAMILY MEDICINE | Facility: CLINIC | Age: 78
End: 2023-03-08
Payer: MEDICARE

## 2023-03-08 DIAGNOSIS — Z11.59 ENCOUNTER FOR SCREENING FOR OTHER VIRAL DISEASES: ICD-10-CM

## 2023-03-08 PROCEDURE — 99441: CPT

## 2023-03-08 NOTE — HISTORY OF PRESENT ILLNESS
[Home] : at home, [unfilled] , at the time of the visit. [Medical Office: ([facility])___] : at the medical office located in  [Verbal consent obtained from patient] : the patient, [unfilled] [FreeTextEntry1] : Pt presents for a follow up on her weight and her sinus infection. [de-identified] : The patient telephoned and requested a call back to discuss their health.  The visit was performed over the telephone as the patient was unable to be seen in the office due to the COVID 19 pandemic.  She reports that she is feeling better regarding the sinus infection.  Her weight is still 96-97 pounds. The night sweats had been better but they were bad last night.  Her BP has been in the 106-140/60-70 range with her heart rate 60-80.  \par

## 2023-03-08 NOTE — PLAN
[FreeTextEntry1] : her conditions are improved and no medication changes were made, she will stop the cefdinir in the next 1-2 days and continue the Bactrim until next week and will follow up in 1 week telephonically or sooner prn

## 2023-03-15 ENCOUNTER — APPOINTMENT (OUTPATIENT)
Dept: FAMILY MEDICINE | Facility: CLINIC | Age: 78
End: 2023-03-15
Payer: MEDICARE

## 2023-03-15 DIAGNOSIS — R69 ILLNESS, UNSPECIFIED: ICD-10-CM

## 2023-03-15 PROCEDURE — 99441: CPT

## 2023-03-15 RX ORDER — CEFDINIR 300 MG/1
300 CAPSULE ORAL TWICE DAILY
Qty: 20 | Refills: 1 | Status: DISCONTINUED | COMMUNITY
Start: 2023-02-14 | End: 2023-03-15

## 2023-03-15 NOTE — PLAN
[FreeTextEntry1] : her conditions are stable, I-STOP was checked and medications were renewed, she will follow up in 1 week telephonically or sooner prn

## 2023-03-15 NOTE — HISTORY OF PRESENT ILLNESS
[Home] : at home, [unfilled] , at the time of the visit. [Medical Office: ([facility])___] : at the medical office located in  [Verbal consent obtained from patient] : the patient, [unfilled] [FreeTextEntry1] : Pt presents for a follow up on the sinus infection and her weight and night sweats. [de-identified] : The patient telephoned and requested a call back to discuss their health.  The visit was performed over the telephone as the patient was unable to be seen in the office due to the COVID 19 pandemic.  She reports that she is feeling mostly better from the sinus infection and would like to stay on the Bactrim for a little longer, she is off of the Cefdinir.  Her weight is 97-98 pounds and her BP has been normal, she did take lisinopril twice in the past week.  The night sweats are gradually improving.  She would like to renew diazepam today\par

## 2023-03-21 ENCOUNTER — NON-APPOINTMENT (OUTPATIENT)
Age: 78
End: 2023-03-21

## 2023-03-22 ENCOUNTER — APPOINTMENT (OUTPATIENT)
Dept: FAMILY MEDICINE | Facility: CLINIC | Age: 78
End: 2023-03-22
Payer: MEDICARE

## 2023-03-22 VITALS — WEIGHT: 98 LBS | BODY MASS INDEX: 16.31 KG/M2

## 2023-03-22 PROCEDURE — 99441: CPT

## 2023-03-22 NOTE — PLAN
[FreeTextEntry1] : a Covid PCR swab was ordered and it will be obtained in her home, she will continue current medications and she will follow up telephonically in 1 week or sooner prn

## 2023-03-22 NOTE — HISTORY OF PRESENT ILLNESS
[Home] : at home, [unfilled] , at the time of the visit. [Medical Office: ([facility])___] : at the medical office located in  [Verbal consent obtained from patient] : the patient, [unfilled] [FreeTextEntry1] : Pt presents for a follow up on her sinus symptoms. [de-identified] : The patient telephoned and requested a call back to discuss their health.  The visit was performed over the telephone as the patient was unable to be seen in the office due to the COVID 19 pandemic.  She has been a lot more achy for the past few days. Her legs are aching and she feels "fluish".  She has a headache.  She is still taking Bactrim.  She checked her BP and it has mostly been in the 120-130/70 range except that last night it was 154/82 and she took lisinopril.  Her weight is 98 pounds\par

## 2023-03-23 ENCOUNTER — NON-APPOINTMENT (OUTPATIENT)
Age: 78
End: 2023-03-23

## 2023-03-24 ENCOUNTER — LABORATORY RESULT (OUTPATIENT)
Age: 78
End: 2023-03-24

## 2023-03-27 ENCOUNTER — NON-APPOINTMENT (OUTPATIENT)
Age: 78
End: 2023-03-27

## 2023-03-29 ENCOUNTER — APPOINTMENT (OUTPATIENT)
Dept: FAMILY MEDICINE | Facility: CLINIC | Age: 78
End: 2023-03-29
Payer: MEDICARE

## 2023-03-29 VITALS — BODY MASS INDEX: 15.98 KG/M2 | WEIGHT: 96 LBS

## 2023-03-29 PROCEDURE — 99441: CPT

## 2023-03-29 NOTE — HISTORY OF PRESENT ILLNESS
[Home] : at home, [unfilled] , at the time of the visit. [Medical Office: ([facility])___] : at the medical office located in  [Verbal consent obtained from patient] : the patient, [unfilled] [FreeTextEntry1] : Pt presents for a follow up on her blood pressure and sinus infection. [de-identified] : The patient telephoned and requested a call back to discuss their health.  The visit was performed over the telephone as the patient was unable to be seen in the office due to the COVID 19 pandemic.  She reports that she is feeling a little less achy and tired.  The diarrhea has resolved and she had a good bowel movement today.  She hasn't resumed the Miralax yet.  Her BP for the past 2 days has been in the 108-133/50-60 range and she hasn't needed any lisinopril in 2 days.  She is tolerating the Cefdinir and Bactrim.  She did have bad night sweats last night but in general they are a little better.  Her weight is down to 96 pounds\par

## 2023-03-29 NOTE — PLAN
[FreeTextEntry1] : she was advised to take the Cefdinir for 3 more days then to stop, she will stay on the Bactrim for a while longer and she will resume the Miralax tomorrow.  She will follow up telephonically in 1 week or sooner prn

## 2023-04-05 ENCOUNTER — APPOINTMENT (OUTPATIENT)
Dept: FAMILY MEDICINE | Facility: CLINIC | Age: 78
End: 2023-04-05
Payer: MEDICARE

## 2023-04-05 VITALS — BODY MASS INDEX: 16.47 KG/M2 | WEIGHT: 99 LBS

## 2023-04-05 PROCEDURE — 99441: CPT

## 2023-04-05 RX ORDER — CEFDINIR 300 MG/1
300 CAPSULE ORAL
Qty: 28 | Refills: 1 | Status: DISCONTINUED | COMMUNITY
Start: 2023-03-23 | End: 2023-04-05

## 2023-04-05 NOTE — HISTORY OF PRESENT ILLNESS
[Home] : at home, [unfilled] , at the time of the visit. [Medical Office: ([facility])___] : at the medical office located in  [Verbal consent obtained from patient] : the patient, [unfilled] [FreeTextEntry1] : Pt presents for a follow up on the sinus infection and her blood pressure [de-identified] : The patient telephoned and requested a call back to discuss their health.  The visit was performed over the telephone as the patient was unable to be seen in the office due to the COVID 19 pandemic.  She had worsening night sweats over the weekend but they are better now.  She is feeling a little more energetic and less achy. She was constipated but had a good bowel movement today.  She is finishing up the prednisone and stopped the Cefdinir.  Her BP has been in the 120-130/60-70 range for the most part, last night it was 152/76 and she took a lisinopril.  Her weight is  \par

## 2023-04-05 NOTE — PLAN
[FreeTextEntry1] : her conditions have slowly improved, no medication changes were made and she will follow up telephonically in 1 week or sooner prn

## 2023-04-12 ENCOUNTER — APPOINTMENT (OUTPATIENT)
Dept: FAMILY MEDICINE | Facility: CLINIC | Age: 78
End: 2023-04-12
Payer: MEDICARE

## 2023-04-12 VITALS — WEIGHT: 100 LBS | BODY MASS INDEX: 16.64 KG/M2

## 2023-04-12 PROCEDURE — 99441: CPT

## 2023-04-12 NOTE — HISTORY OF PRESENT ILLNESS
[Home] : at home, [unfilled] , at the time of the visit. [Medical Office: ([facility])___] : at the medical office located in  [Verbal consent obtained from patient] : the patient, [unfilled] [FreeTextEntry1] : Pt presents for a follow up on her sinus infections and night sweats. [de-identified] : The patient telephoned and requested a call back to discuss their health.  The visit was performed over the telephone as the patient was unable to be seen in the office due to the COVID 19 pandemic.  She still has some sinus pain and pressure and would like to continue the Bactrim.  She has redness of one eye and itching of both.  The night sweats are a little better.  She was able to see her family for Easter.  Her weight is 100 pounds.\par

## 2023-04-12 NOTE — PLAN
[FreeTextEntry1] : she will continue taking Bactrim bid, I also prescribed sulfacetamide sodium eye drops, 1 drop in affected eye tid and renewed fluticasone nasal spray, she will follow up in 1 week or sooner prn

## 2023-04-19 ENCOUNTER — APPOINTMENT (OUTPATIENT)
Dept: FAMILY MEDICINE | Facility: CLINIC | Age: 78
End: 2023-04-19
Payer: MEDICARE

## 2023-04-19 DIAGNOSIS — H10.13 ACUTE ATOPIC CONJUNCTIVITIS, BILATERAL: ICD-10-CM

## 2023-04-19 DIAGNOSIS — Z86.69 PERSONAL HISTORY OF OTHER DISEASES OF THE NERVOUS SYSTEM AND SENSE ORGANS: ICD-10-CM

## 2023-04-19 PROCEDURE — 99441: CPT

## 2023-04-19 NOTE — PLAN
[FreeTextEntry1] : she was advised to take prednisone as directed and to continue the Bactrim, I-STOP was checked and diazepam renewed, olopatadine eye drops were prescribed, she will follow up telephonically in 1 week or sooner prn

## 2023-04-19 NOTE — HISTORY OF PRESENT ILLNESS
[Home] : at home, [unfilled] , at the time of the visit. [Medical Office: ([facility])___] : at the medical office located in  [Verbal consent obtained from patient] : the patient, [unfilled] [FreeTextEntry1] : Pt presents for a follow up on her sinus symptoms and her weight. [de-identified] : The patient telephoned and requested a call back to discuss their health.  The visit was performed over the telephone as the patient was unable to be seen in the office due to the COVID 19 pandemic.  She was feeling tired over the weekend and since yesterday she has some muscle aches and headaches.  She denies any exposure to anyone that has been ill.  The eyes are a little better.  Her BP has been normal except it was high yesterday and she took a lisinopril.  She would like to renew the diazepam.  She needs a less expensive allergy eye drop.  Her weight is 98 pounds.\par

## 2023-04-26 ENCOUNTER — APPOINTMENT (OUTPATIENT)
Dept: FAMILY MEDICINE | Facility: CLINIC | Age: 78
End: 2023-04-26
Payer: MEDICARE

## 2023-04-26 VITALS — WEIGHT: 97 LBS | BODY MASS INDEX: 16.14 KG/M2

## 2023-04-26 PROCEDURE — 99441: CPT

## 2023-04-26 RX ORDER — SULFACETAMIDE SODIUM 100 MG/ML
10 SOLUTION OPHTHALMIC 3 TIMES DAILY
Qty: 1 | Refills: 2 | Status: DISCONTINUED | COMMUNITY
Start: 2020-04-08 | End: 2023-04-26

## 2023-04-26 NOTE — HISTORY OF PRESENT ILLNESS
[Home] : at home, [unfilled] , at the time of the visit. [Medical Office: ([facility])___] : at the medical office located in  [Verbal consent obtained from patient] : the patient, [unfilled] [FreeTextEntry1] : Pt presents for a follow up on her sinus infection and weakness. [de-identified] : The patient telephoned and requested a call back to discuss their health.  The visit was performed over the telephone as the patient was unable to be seen in the office due to the COVID 19 pandemic.  Her BP was elevated twice yesterday and she took 2 doses of the lisinopril and it came down.  She didn't sleep well Monday night but slept for 8 hours last night. She is tolerating the Bactrim and Prednisone, she stopped the antibiotic eye drops and is using lubricating eye drops.  Her weight is 97 pounds.\par

## 2023-04-26 NOTE — PLAN
[FreeTextEntry1] : she will continue current medications and monitoring of her BP and weight, she will follow up telephonically in 1 week or sooner prn

## 2023-04-26 NOTE — REVIEW OF SYSTEMS
[Fatigue] : fatigue [Night Sweats] : night sweats [Recent Change In Weight] : ~T recent weight change [Itching] : itching [Nasal Discharge] : nasal discharge [Postnasal Drip] : postnasal drip [Negative] : Heme/Lymph

## 2023-04-28 ENCOUNTER — NON-APPOINTMENT (OUTPATIENT)
Age: 78
End: 2023-04-28

## 2023-05-02 ENCOUNTER — NON-APPOINTMENT (OUTPATIENT)
Age: 78
End: 2023-05-02

## 2023-05-03 ENCOUNTER — APPOINTMENT (OUTPATIENT)
Dept: FAMILY MEDICINE | Facility: CLINIC | Age: 78
End: 2023-05-03
Payer: MEDICARE

## 2023-05-03 PROCEDURE — 99442: CPT

## 2023-05-03 NOTE — HISTORY OF PRESENT ILLNESS
[Home] : at home, [unfilled] , at the time of the visit. [Medical Office: ([facility])___] : at the medical office located in  [Verbal consent obtained from patient] : the patient, [unfilled] [FreeTextEntry1] : Pt presents for a follow up on the weakness, flu like symptoms and her weight. [de-identified] : The patient telephoned and requested a call back to discuss their health.  The visit was performed over the telephone as the patient was unable to be seen in the office due to the COVID 19 pandemic.  For the past 3 days she has been feeling more muscle aches and headaches.  She started taking the prednisone today.  She is using her nasal sprays daily.  She has been needing naps during the day.  She was constipated over the weekend and took Miralax then her stools were loose and she held the Miralax, that resolved and she resumed the Miralax.  Her BP has been a little high but not often and she needed a few doses of lisinopril.  Her BP is normal today.  Her weight is still 97 pounds.\par

## 2023-05-03 NOTE — PLAN
[FreeTextEntry1] : she will continue to take the prednisone as discussed yesterday and will continue other medications without change, will rest and eat protein regularly and follow up in 1 week telephonically or sooner prn

## 2023-05-09 ENCOUNTER — APPOINTMENT (OUTPATIENT)
Dept: FAMILY MEDICINE | Facility: CLINIC | Age: 78
End: 2023-05-09
Payer: MEDICARE

## 2023-05-09 DIAGNOSIS — R53.83 OTHER FATIGUE: ICD-10-CM

## 2023-05-09 PROCEDURE — 99442: CPT

## 2023-05-09 NOTE — PLAN
[FreeTextEntry1] : She was advised to consider changing off of the Bactrim however she declined a change and requested a renewal on the Bactrim and it was refilled today.  She is finishing up the prednisone in the next few days.  She was advised to consider increasing the mirtazapine dose that she seemed very anxious which she declined as it usually causes constipation.  She will continue checking her blood pressure regularly and will add in lisinopril for systolic blood pressure readings greater than 130.  She was given significant emotional support during today's visit and was advised to increase her activity as tolerated and to try to push herself to eat better.  She will follow-up in 1 week or sooner if needed.

## 2023-05-09 NOTE — HISTORY OF PRESENT ILLNESS
[Home] : at home, [unfilled] , at the time of the visit. [Medical Office: ([facility])___] : at the medical office located in  [Verbal consent obtained from patient] : the patient, [unfilled] [FreeTextEntry8] : The patient telephoned and requested a call back to discuss their health.  The visit was performed over the telephone as the patient was unable to be seen in the office due to the COVID 19 pandemic.  Over the past few days she has had more weakness and headaches and her face is feeling puffy.  She is very nervous that she is getting worse.  She now weighs 95-96 pounds and hasn't had a great appetite.  The headache is better today.  Over the weekend her BP has been in the 150/80-90 range but today it is 144/84.  She has been needing the lisinopril and took a few extra doses of metoprolol.\par

## 2023-05-10 ENCOUNTER — NON-APPOINTMENT (OUTPATIENT)
Age: 78
End: 2023-05-10

## 2023-05-10 ENCOUNTER — APPOINTMENT (OUTPATIENT)
Dept: FAMILY MEDICINE | Facility: CLINIC | Age: 78
End: 2023-05-10

## 2023-05-11 ENCOUNTER — LABORATORY RESULT (OUTPATIENT)
Age: 78
End: 2023-05-11

## 2023-05-17 ENCOUNTER — APPOINTMENT (OUTPATIENT)
Dept: FAMILY MEDICINE | Facility: CLINIC | Age: 78
End: 2023-05-17
Payer: MEDICARE

## 2023-05-17 PROCEDURE — 99442: CPT

## 2023-05-17 NOTE — PLAN
[FreeTextEntry1] : she was given much encouragement during the visit as she is still very anxious, she will continue all of her medications without change and she will follow up in 1 week or sooner prn, the lab report was reviewed again

## 2023-05-17 NOTE — HISTORY OF PRESENT ILLNESS
[Home] : at home, [unfilled] , at the time of the visit. [Medical Office: ([facility])___] : at the medical office located in  [Verbal consent obtained from patient] : the patient, [unfilled] [FreeTextEntry1] : Pt presents for a follow up on her blood pressure and her body aches [de-identified] : The patient telephoned and requested a call back to discuss their health.  The visit was performed over the telephone as the patient was unable to be seen in the office due to the COVID 19 pandemic.  She had a good day 2 days ago and didn't have any body aches.  She ran some errands yesterday and now has a little more body aches and headaches.  Her BP is now in the 120-130/60-70 range and her heart rate is in the 69-80 range.  Her weight is 95 pounds.  She isn't eating well but is drinking the protein shakes.\par

## 2023-05-21 NOTE — CURRENT MEDS
[None] : Patient does not have any barriers to medication adherence [Takes medication as prescribed] : takes IBD (inflammatory bowel disease)

## 2023-05-24 ENCOUNTER — APPOINTMENT (OUTPATIENT)
Dept: FAMILY MEDICINE | Facility: CLINIC | Age: 78
End: 2023-05-24
Payer: MEDICARE

## 2023-05-24 PROCEDURE — 99442: CPT

## 2023-05-24 RX ORDER — CEFDINIR 300 MG/1
300 CAPSULE ORAL TWICE DAILY
Qty: 28 | Refills: 1 | Status: DISCONTINUED | COMMUNITY
Start: 2023-05-10 | End: 2023-05-24

## 2023-05-24 NOTE — ADDENDUM
[FreeTextEntry1] : I received a call from the pharmacy, Cefdinir is on back order and I sent in a rx for Cefuroxime

## 2023-05-24 NOTE — HISTORY OF PRESENT ILLNESS
[Home] : at home, [unfilled] , at the time of the visit. [Medical Office: ([facility])___] : at the medical office located in  [Verbal consent obtained from patient] : the patient, [unfilled] [FreeTextEntry1] : Pt presents for a follow up on her body aches and congestion. [de-identified] : The patient telephoned and requested a call back to discuss their health.  The visit was performed over the telephone as the patient was unable to be seen in the office due to the COVID 19 pandemic.  She reports that she felt better over the weekend but now is a little more achy and tired.  Her BP is in the 120-130/60-70 range.  Her weight is down to 93 pounds.  She is still feeling nervous and anxious.\par

## 2023-05-24 NOTE — PLAN
[FreeTextEntry1] : I reviewed her BP readings with her and her hypertension is stable.  She is slowly recovering from the sinus infection and was advised to continue taking Cefdinir and Bactrim and using the nasal sprays.  She was advised to increase her caloric intake.  I-STOP was checked and diazepam and Cefdinir renewed.  She was given significant emotional support and encouragement throughout the visit and will follow up in 1 week telephonically or sooner prn

## 2023-05-31 ENCOUNTER — APPOINTMENT (OUTPATIENT)
Dept: FAMILY MEDICINE | Facility: CLINIC | Age: 78
End: 2023-05-31
Payer: MEDICARE

## 2023-05-31 PROCEDURE — 99442: CPT

## 2023-05-31 NOTE — PLAN
[FreeTextEntry1] : she was advised to continue taking the Cefuroxime and Bactrim along with her other medications.  She asked about resuming prednisone but I advised her to hold off on it so as to avoid long term complications of steroids.  She was given significant emotional support throughout the visit and will follow up telephonically in 1 week or sooner prn

## 2023-05-31 NOTE — HISTORY OF PRESENT ILLNESS
[Home] : at home, [unfilled] , at the time of the visit. [Medical Office: ([facility])___] : at the medical office located in  [Verbal consent obtained from patient] : the patient, [unfilled] [FreeTextEntry1] : Pt presents for a follow up on the sinus and flu like symptoms. [de-identified] : The patient telephoned and requested a call back to discuss their health.  The visit was performed over the telephone as the patient was unable to be seen in the office due to the COVID 19 pandemic.  Her nasal discharge is white in the morning and clear later in the day.  She still gets some sinus pain and pressure.  Her weight is down to 92 pounds.  She still has body aches on and off.  She just started taking a multivitamin. She used the albuterol inhaler this morning and it helped with her chest tightness.  She is slowly increasing her activities.  There's been no change in the night sweats, she ran out of the black cohosh but is still taking the evening primrose oil.  Her blood pressure and pulse readings have been in the normal range except for 1 SBP reading over 140\par

## 2023-06-07 ENCOUNTER — APPOINTMENT (OUTPATIENT)
Dept: FAMILY MEDICINE | Facility: CLINIC | Age: 78
End: 2023-06-07
Payer: MEDICARE

## 2023-06-07 VITALS — WEIGHT: 93 LBS | BODY MASS INDEX: 15.48 KG/M2

## 2023-06-07 PROCEDURE — 99441: CPT

## 2023-06-07 NOTE — PLAN
[FreeTextEntry1] : she is improving but it has been a slow process, therefore I will renew the cefuroxime and the Bactrim, she will resume the evening primrose oil and follow up in 1 week or sooner prn

## 2023-06-07 NOTE — HISTORY OF PRESENT ILLNESS
[Home] : at home, [unfilled] , at the time of the visit. [Medical Office: ([facility])___] : at the medical office located in  [Verbal consent obtained from patient] : the patient, [unfilled] [FreeTextEntry1] : Pt presents for a follow up on her weakness and sinus infection. [de-identified] : The patient telephoned and requested a call back to discuss their health.  The visit was performed over the telephone as the patient was unable to be seen in the office due to the COVID 19 pandemic.   She reports that she is feeling stronger and having less muscle aches and less weakness and less headaches.  She gained a pound and is feeling hungrier.  She would like to renew both antibiotics.  Her BP is in the 110-120/60-70 range and her heart rate is in the 60-70 range.  She still has the night sweats on and off.  She is taking the evening primrose oil and will resume the black cohosh.\par

## 2023-06-14 ENCOUNTER — APPOINTMENT (OUTPATIENT)
Dept: FAMILY MEDICINE | Facility: CLINIC | Age: 78
End: 2023-06-14
Payer: MEDICARE

## 2023-06-14 PROCEDURE — 99441: CPT

## 2023-06-14 NOTE — HISTORY OF PRESENT ILLNESS
[Home] : at home, [unfilled] , at the time of the visit. [Medical Office: ([facility])___] : at the medical office located in  [Verbal consent obtained from patient] : the patient, [unfilled] [FreeTextEntry1] : Pt presents for a follow up the sinus infection. [de-identified] : The patient telephoned and requested a call back to discuss their health.  The visit was performed over the telephone as the patient was unable to be seen in the office due to the COVID 19 pandemic.  She states that she still has some pain in the right side of her face on and off but the flu like body aches have resolved.  She still has some pain in her legs on and off.  The night sweats are worse, she hasn't been able to get the black cohosh yet.  She would like to discuss a cholesterol medication but wants to wait until after she finishes the antibiotics.  She is eating better and gained 2 pounds!!!  Her BP has been in the 110-120/60 range.  She is feeling less anxious and is moving her bowels well.\par

## 2023-06-14 NOTE — PLAN
[FreeTextEntry1] : she was advised to finish out the course of antibiotics and we will start a low dose cholesterol lowering medication next week, she will continue to increase caloric intake and will follow up telephonically next week or sooner prn

## 2023-06-21 ENCOUNTER — APPOINTMENT (OUTPATIENT)
Dept: FAMILY MEDICINE | Facility: CLINIC | Age: 78
End: 2023-06-21
Payer: MEDICARE

## 2023-06-21 DIAGNOSIS — Z87.09 PERSONAL HISTORY OF OTHER DISEASES OF THE RESPIRATORY SYSTEM: ICD-10-CM

## 2023-06-21 PROCEDURE — 99442: CPT

## 2023-06-21 NOTE — PLAN
[FreeTextEntry1] : the potential side effects of pravastatin were reviewed and she will take 10 mg at HS and she will start the black cohosh, she will continue other medications without change.  She will continue efforts to gain weight but also avoid eating cholesterol rich foods and will follow up telephonically in 1 week or sooner prn

## 2023-06-21 NOTE — HISTORY OF PRESENT ILLNESS
[Home] : at home, [unfilled] , at the time of the visit. [Medical Office: ([facility])___] : at the medical office located in  [Verbal consent obtained from patient] : the patient, [unfilled] [FreeTextEntry1] : Pt presents for a follow up on her cholesterol, sinus congestion and night sweats. [de-identified] : The patient telephoned and requested a call back to discuss their health.  The visit was performed over the telephone as the patient was unable to be seen in the office due to the COVID 19 pandemic.  She plans to resume the black cohosh for the night sweats.  Her weight is fluctuating from 94-95 pounds. She would like to stay on the antibiotics for now as she still has some right sided sinus pressure.  Her BP is in the normal range.  \par

## 2023-06-28 ENCOUNTER — APPOINTMENT (OUTPATIENT)
Dept: FAMILY MEDICINE | Facility: CLINIC | Age: 78
End: 2023-06-28
Payer: MEDICARE

## 2023-06-28 PROCEDURE — 99441: CPT

## 2023-06-28 NOTE — HISTORY OF PRESENT ILLNESS
[Home] : at home, [unfilled] , at the time of the visit. [Medical Office: ([facility])___] : at the medical office located in  [Verbal consent obtained from patient] : the patient, [unfilled] [FreeTextEntry1] : Pt presents for a follow up on the cholesterol, night sweats and her sinus infection. [de-identified] : The patient telephoned and requested a call back to discuss their health.  The visit was performed over the telephone as the patient was unable to be seen in the office due to the COVID 19 pandemic.  She reports that she is tolerating the pravastatin.  She is still getting the night sweats, she just started the Black Cohosh again last Friday.  She still has the pain and pressure in the right side of her face, she will finish the cefuroxime and will stay on the Bactrim for now.  Her weight is 95 pounds and her BP is in the 110-120/60 range and her pulse is 70-80.  She would like to renew diazepam today\par

## 2023-06-28 NOTE — PLAN
[FreeTextEntry1] : I-STOP was checked and diazepam renewed, she will continue all medications without change, will plan to recheck the lipids 6 weeks after the pravastatin was started and she will follow up telephonically in 1 week or sooner prn

## 2023-07-05 ENCOUNTER — APPOINTMENT (OUTPATIENT)
Dept: FAMILY MEDICINE | Facility: CLINIC | Age: 78
End: 2023-07-05
Payer: MEDICARE

## 2023-07-05 ENCOUNTER — NON-APPOINTMENT (OUTPATIENT)
Age: 78
End: 2023-07-05

## 2023-07-05 PROCEDURE — 99441: CPT

## 2023-07-05 RX ORDER — CEFUROXIME AXETIL 500 MG/1
500 TABLET ORAL
Qty: 28 | Refills: 1 | Status: DISCONTINUED | COMMUNITY
Start: 2023-05-24 | End: 2023-07-05

## 2023-07-05 NOTE — PLAN
[FreeTextEntry1] : Bactrim was renewed and she will continue taking all other medications and supplements without change.  She was encouraged to continue to increase her caloric intake and she will follow-up in 1 week or sooner if needed.

## 2023-07-05 NOTE — HISTORY OF PRESENT ILLNESS
[Home] : at home, [unfilled] , at the time of the visit. [Medical Office: ([facility])___] : at the medical office located in  [Verbal consent obtained from patient] : the patient, [unfilled] [FreeTextEntry1] : Pt presents for a follow up on her sinus infection and blood pressure. [de-identified] : The patient telephoned and requested a call back to discuss their health.  The visit was performed over the telephone as the patient was unable to be seen in the office due to the COVID 19 pandemic.  She reports that she stopped the cefuroxime last week and has not had any worsening of the sinus pain or pressure but would like to continue on the Bactrim for now.  She is eating better but her weight is still only 95 pounds.  She had 1 day where she had an upset stomach but otherwise the abdominal pain has been under good control.  She had 1 episode of elevated blood pressure to 140/67 and took lisinopril but her other blood pressure readings have been normal.  She is still experiencing the night sweats.  She is still tolerating the statin.\par

## 2023-07-12 ENCOUNTER — APPOINTMENT (OUTPATIENT)
Dept: FAMILY MEDICINE | Facility: CLINIC | Age: 78
End: 2023-07-12
Payer: MEDICARE

## 2023-07-12 VITALS — BODY MASS INDEX: 16.31 KG/M2 | WEIGHT: 98 LBS

## 2023-07-12 DIAGNOSIS — J30.9 ALLERGIC RHINITIS, UNSPECIFIED: ICD-10-CM

## 2023-07-12 PROCEDURE — 99442: CPT

## 2023-07-12 NOTE — HISTORY OF PRESENT ILLNESS
[Home] : at home, [unfilled] , at the time of the visit. [Medical Office: ([facility])___] : at the medical office located in  [Verbal consent obtained from patient] : the patient, [unfilled] [FreeTextEntry1] : She presents for follow-up on her ongoing sinus condition and her weight loss. [de-identified] : The patient telephoned and requested a call back to discuss their health.  The visit was performed over the telephone as the patient was unable to be seen in the office due to the COVID 19 pandemic.  She reports that she is eating better her and her current weight is 98 pounds.  Her blood pressures have also been doing well, mostly in the 110-120/70 range.  She still has some sinus congestion and did have some eye discharge for which she uses sulfacetamide eyedrops and it resolved.  The night sweats have improved slightly and she is still tolerating her medications but wants to defer cholesterol testing until September.\par

## 2023-07-12 NOTE — PLAN
[FreeTextEntry1] : She will continue taking the Bactrim for now I but next week we will discuss stopping it since she has been on it for prolonged period of time.  She will continue to follow the healthy diet and continue to increase caloric intake and to try to gain a little bit more weight.  She will continue all medications without change and the lipid lab testing can be deferred until September.  She will follow-up telephonically in 1 week or sooner if needed.

## 2023-07-19 ENCOUNTER — APPOINTMENT (OUTPATIENT)
Dept: FAMILY MEDICINE | Facility: CLINIC | Age: 78
End: 2023-07-19
Payer: MEDICARE

## 2023-07-19 PROCEDURE — 99442: CPT

## 2023-07-19 NOTE — PLAN
[FreeTextEntry1] : she was advised to apply heat to the thumb and to do daily exercises to increase the range of motion, she will keep increasing her caloric intake and continue monitoring her BP at home, she will continue Bactrim for another week and we will reassess her sinus symptoms next week

## 2023-07-19 NOTE — HISTORY OF PRESENT ILLNESS
[Home] : at home, [unfilled] , at the time of the visit. [Medical Office: ([facility])___] : at the medical office located in  [Verbal consent obtained from patient] : the patient, [unfilled] [FreeTextEntry1] : Pt presents for a follow up on her weight, sinus congestion and night sweats. [de-identified] : The patient telephoned and requested a call back to discuss their health.  The visit was performed over the telephone as the patient was unable to be seen in the office due to the COVID 19 pandemic.  She has been having pain and stiffness in the right thumb, it is very hard to bend it especially in the mornings.  Her weight is down to 96 pounds but she has been eating well.  She still has some muscle aches and sinus congestion. The night sweats are a little better.  Her BP is in the 110-120/60-70 range and her heart rate is in the 70's\par

## 2023-07-26 ENCOUNTER — APPOINTMENT (OUTPATIENT)
Dept: FAMILY MEDICINE | Facility: CLINIC | Age: 78
End: 2023-07-26
Payer: MEDICARE

## 2023-07-26 PROCEDURE — 99441: CPT

## 2023-07-26 NOTE — PLAN
[FreeTextEntry1] : she was advised to do ROM exercises for the thumb and to continue to apply heat daily, she will continue current medications for now and will reassess the need for the antibiotic next week, she will follow up in 1 week or sooner prn

## 2023-07-26 NOTE — HISTORY OF PRESENT ILLNESS
[Home] : at home, [unfilled] , at the time of the visit. [Medical Office: ([facility])___] : at the medical office located in  [Verbal consent obtained from patient] : the patient, [unfilled] [FreeTextEntry1] : Pt presents for a follow up on her sinus infection and her weight and her trigger thumb. [de-identified] : The patient telephoned and requested a call back to discuss their health.  The visit was performed over the telephone as the patient was unable to be seen in the office due to the COVID 19 pandemic.  She is applying heat to the thumb and asking about doing exercises for it at home.  She is still congested on and off due to the humid weather.  Her weight is still 96 pounds and her BP is in the 120's-130's systolic.  \par

## 2023-08-02 ENCOUNTER — APPOINTMENT (OUTPATIENT)
Dept: FAMILY MEDICINE | Facility: CLINIC | Age: 78
End: 2023-08-02
Payer: MEDICARE

## 2023-08-02 VITALS — WEIGHT: 96 LBS | BODY MASS INDEX: 15.98 KG/M2

## 2023-08-02 DIAGNOSIS — R61 GENERALIZED HYPERHIDROSIS: ICD-10-CM

## 2023-08-02 PROCEDURE — 99442: CPT

## 2023-08-02 NOTE — PLAN
[FreeTextEntry1] : She was given names and numbers of local hand orthopedists and will schedule the consultation.  She will remain on Bactrim for the chronic sinusitis for now and we will discuss coming off of it again in the near future.  I-STOP was checked and diazepam was renewed and she will continue all other medications without change.  She was given emotional support today and she will follow-up telephonically in 1 week or sooner if needed.

## 2023-08-02 NOTE — HISTORY OF PRESENT ILLNESS
[Home] : at home, [unfilled] , at the time of the visit. [Medical Office: ([facility])___] : at the medical office located in  [Verbal consent obtained from patient] : the patient, [unfilled] [de-identified] : The patient telephoned and requested a call back to discuss their health.  The visit was performed over the telephone as the patient was unable to be seen in the office due to the COVID 19 pandemic.  She is still having pain in the thumb and would like to schedule a hand specialist consultation.  The night sweats are a little bit better now.  Her weight is still 96 pounds.  Her blood pressure is doing well and it is in the 110-1 0 range systolic in the 60-70 range diastolic.  Her pulse rate is in the 70-80 range.  She is still having some sinus congestion and is worried about stopping the Bactrim and would like to stay on it for now.  She was also like to renew the diazepam today.

## 2023-08-07 ENCOUNTER — NON-APPOINTMENT (OUTPATIENT)
Age: 78
End: 2023-08-07

## 2023-08-09 ENCOUNTER — APPOINTMENT (OUTPATIENT)
Dept: FAMILY MEDICINE | Facility: CLINIC | Age: 78
End: 2023-08-09
Payer: MEDICARE

## 2023-08-09 PROCEDURE — 99442: CPT

## 2023-08-09 NOTE — PLAN
[FreeTextEntry1] : she was advised to continue taking cefuroxime 500 mg bid and Bactrim twice a day and to continue current supplements and other prescription medications, the anxiety is stable and she was advised to increase hydration and to follow up telephonically in 1 week or sooner for any worsening symptoms

## 2023-08-09 NOTE — HISTORY OF PRESENT ILLNESS
[Home] : at home, [unfilled] , at the time of the visit. [Medical Office: ([facility])___] : at the medical office located in  [Verbal consent obtained from patient] : the patient, [unfilled] [FreeTextEntry1] : Pt presents for a follow up on the flu like symptoms, sinus pain and pressure and the burning with urination and blood in her urine. [de-identified] : The patient telephoned and requested a call back to discuss their health.  The visit was performed over the telephone as the patient was unable to be seen in the office due to the COVID 19 pandemic.  She reports that the bladder pain and passage of blood in her urine resolved after about 1 and 1/2 hours.  She still has mild flu like symptoms but they are improving.  The thumb pain is about the same.  Her weight is still 96 pounds.  Her BP has been in the 120-130 range systolic but it was higher a few days ago.  The night sweats are about the same

## 2023-08-16 ENCOUNTER — APPOINTMENT (OUTPATIENT)
Dept: FAMILY MEDICINE | Facility: CLINIC | Age: 78
End: 2023-08-16
Payer: MEDICARE

## 2023-08-16 PROCEDURE — 99442: CPT

## 2023-08-16 NOTE — PLAN
[FreeTextEntry1] : she was advised to apply the cream bid as directed and to try to stop the antibiotics, no other medication changes were made, she was given emotional support and all of her questions were thoroughly answered Statement Selected

## 2023-08-16 NOTE — HISTORY OF PRESENT ILLNESS
[Home] : at home, [unfilled] , at the time of the visit. [Medical Office: ([facility])___] : at the medical office located in  [Verbal consent obtained from patient] : the patient, [unfilled] [FreeTextEntry1] : Pt presents for a follow up on the UTI and the sinus infection. [de-identified] : The patient telephoned and requested a call back to discuss their health.  The visit was performed over the telephone as the patient was unable to be seen in the office due to the COVID 19 pandemic.  She reports that she hasn't seen any further blood in her urine and she has mild discomfort in the bladder area in the morning but it doesn't last for long. She feels less flu like symptoms. She has been a little more active.  Her weight is still 96 pounds.  Her BP is in the 110-120/60 range and her pulse is in the 70's.  The thumb pain is about the same.

## 2023-08-16 NOTE — PLAN
[FreeTextEntry1] : she was advised to continue current medications and to continue the thumb exercises, will discuss the antibiotic tx next week

## 2023-08-23 ENCOUNTER — APPOINTMENT (OUTPATIENT)
Dept: FAMILY MEDICINE | Facility: CLINIC | Age: 78
End: 2023-08-23
Payer: MEDICARE

## 2023-08-23 PROCEDURE — 99442: CPT

## 2023-08-23 RX ORDER — CEFUROXIME AXETIL 500 MG/1
500 TABLET ORAL
Qty: 28 | Refills: 1 | Status: DISCONTINUED | COMMUNITY
Start: 2023-08-07 | End: 2023-08-23

## 2023-08-23 NOTE — HISTORY OF PRESENT ILLNESS
[Home] : at home, [unfilled] , at the time of the visit. [Medical Office: ([facility])___] : at the medical office located in  [Verbal consent obtained from patient] : the patient, [unfilled] [FreeTextEntry1] : Pt presents for a follow up on the UTI [de-identified] : The patient telephoned and requested a call back to discuss their health.  The visit was performed over the telephone as the patient was unable to be seen in the office due to the COVID 19 pandemic.  She still has mild discomfort in the bladder area but hasn't seen any further blood.  She still feels a little tired and is achy in the mornings.  Her weight is still 96 pounds. The thumb pain is about the same or maybe a little better.  The night sweats are a little better.  She would like to continue on both antibiotics but is having trouble swallowing the cefuroxime and would like a rx for cefdinir.  Her home BP readings are normal.

## 2023-08-23 NOTE — PLAN
[FreeTextEntry1] : she will stop taking the cefuroxime and will start Cefdinir 300 mg bid and continue the Bactrim.  She is tolerating the pravastatin but wants to defer the blood work until September.  Her BP has been normal and she will continue to monitor it at home and will follow up telephonically in 1 week or sooner prn

## 2023-08-30 ENCOUNTER — APPOINTMENT (OUTPATIENT)
Dept: FAMILY MEDICINE | Facility: CLINIC | Age: 78
End: 2023-08-30
Payer: MEDICARE

## 2023-08-30 DIAGNOSIS — N39.0 URINARY TRACT INFECTION, SITE NOT SPECIFIED: ICD-10-CM

## 2023-08-30 PROCEDURE — 99441: CPT

## 2023-08-30 NOTE — PLAN
[FreeTextEntry1] : I checked I-STOP and renewed diazepam and Bactrim, she will resume the thumb exercises when able and will follow up telephonically in 1 week or sooner prn

## 2023-08-30 NOTE — HISTORY OF PRESENT ILLNESS
----- Message from Marleen Griffin sent at 11/5/2020 12:43 PM CST -----  Regarding: advice  Contact: DUNIA GUZMAN SR. [[id number]]  Patient is requesting a call back from the nurse want to know when he's due to be seen?  Please call the patient upon request at phone number [phone].      [Home] : at home, [unfilled] , at the time of the visit. [Medical Office: ([facility])___] : at the medical office located in  [Verbal consent obtained from patient] : the patient, [unfilled] [FreeTextEntry1] : Pt presents for a follow up on the bladder pain and sinus infection. [de-identified] : The patient telephoned and requested a call back to discuss their health.  The visit was performed over the telephone as the patient was unable to be seen in the office due to the COVID 19 pandemic.  She was feeling better but last Thursday she felt worsening pain and pressure in her sinuses and worsening bladder pain and was more achy.  Now she feels a little better but feels a little weak.  Her BP is in the 120-130 range systolic but last week she had 1 reading over 140 and took the lisinopril. She hasn't been doing her thumb exercises due to feeling fatigued.

## 2023-09-06 ENCOUNTER — APPOINTMENT (OUTPATIENT)
Dept: FAMILY MEDICINE | Facility: CLINIC | Age: 78
End: 2023-09-06
Payer: MEDICARE

## 2023-09-06 VITALS — BODY MASS INDEX: 15.81 KG/M2 | WEIGHT: 95 LBS

## 2023-09-06 DIAGNOSIS — M65.311 TRIGGER THUMB, RIGHT THUMB: ICD-10-CM

## 2023-09-06 PROCEDURE — 99442: CPT

## 2023-09-06 NOTE — HISTORY OF PRESENT ILLNESS
[Home] : at home, [unfilled] , at the time of the visit. [Medical Office: ([facility])___] : at the medical office located in  [Verbal consent obtained from patient] : the patient, [unfilled] [FreeTextEntry1] : Pt presents for a follow up on her weakness and sinus symptoms [de-identified] : The patient telephoned and requested a call back to discuss their health.  The visit was performed over the telephone as the patient was unable to be seen in the office due to the COVID 19 pandemic.  She was a little short of breath earlier this morning but was doing a lot of errands yesterday.  She reports improvement in the flu like body aches and the bladder pain has improved. She would like to continue taking the antibiotics for another week. She is eating better but lost a pound and currently weighs 95 pounds. The night sweats are getting better.  The thumb isn't locking up as often.

## 2023-09-06 NOTE — PLAN
[FreeTextEntry1] : she will continue her current medications without change and will continue doing the home exercises for the trigger thumb, she will try to increase her caloric intake and we will discuss retesting her labs next week.

## 2023-09-13 ENCOUNTER — APPOINTMENT (OUTPATIENT)
Dept: FAMILY MEDICINE | Facility: CLINIC | Age: 78
End: 2023-09-13
Payer: MEDICARE

## 2023-09-13 PROCEDURE — 99442: CPT

## 2023-09-20 ENCOUNTER — APPOINTMENT (OUTPATIENT)
Dept: FAMILY MEDICINE | Facility: CLINIC | Age: 78
End: 2023-09-20
Payer: MEDICARE

## 2023-09-20 VITALS — BODY MASS INDEX: 15.64 KG/M2 | WEIGHT: 94 LBS

## 2023-09-20 PROCEDURE — 99442: CPT

## 2023-09-27 ENCOUNTER — APPOINTMENT (OUTPATIENT)
Dept: FAMILY MEDICINE | Facility: CLINIC | Age: 78
End: 2023-09-27
Payer: MEDICARE

## 2023-09-27 VITALS — WEIGHT: 96 LBS | BODY MASS INDEX: 15.98 KG/M2

## 2023-09-27 PROCEDURE — 99442: CPT

## 2023-09-27 RX ORDER — CEFDINIR 300 MG/1
300 CAPSULE ORAL TWICE DAILY
Qty: 28 | Refills: 1 | Status: DISCONTINUED | COMMUNITY
Start: 2023-08-23 | End: 2023-09-27

## 2023-10-04 ENCOUNTER — APPOINTMENT (OUTPATIENT)
Dept: FAMILY MEDICINE | Facility: CLINIC | Age: 78
End: 2023-10-04
Payer: MEDICARE

## 2023-10-04 VITALS — BODY MASS INDEX: 16.31 KG/M2 | WEIGHT: 98 LBS

## 2023-10-04 DIAGNOSIS — R10.9 UNSPECIFIED ABDOMINAL PAIN: ICD-10-CM

## 2023-10-04 DIAGNOSIS — G89.29 UNSPECIFIED ABDOMINAL PAIN: ICD-10-CM

## 2023-10-04 PROCEDURE — 99442: CPT

## 2023-10-04 RX ORDER — AZELASTINE HYDROCHLORIDE 137 UG/1
0.1 SPRAY, METERED NASAL
Qty: 30 | Refills: 11 | Status: ACTIVE | COMMUNITY
Start: 2022-05-11 | End: 1900-01-01

## 2023-10-11 ENCOUNTER — APPOINTMENT (OUTPATIENT)
Dept: FAMILY MEDICINE | Facility: CLINIC | Age: 78
End: 2023-10-11
Payer: MEDICARE

## 2023-10-11 DIAGNOSIS — R09.81 NASAL CONGESTION: ICD-10-CM

## 2023-10-11 PROCEDURE — 99442: CPT

## 2023-10-12 ENCOUNTER — LABORATORY RESULT (OUTPATIENT)
Age: 78
End: 2023-10-12

## 2023-10-18 ENCOUNTER — APPOINTMENT (OUTPATIENT)
Dept: FAMILY MEDICINE | Facility: CLINIC | Age: 78
End: 2023-10-18
Payer: MEDICARE

## 2023-10-18 VITALS — WEIGHT: 95 LBS | BODY MASS INDEX: 15.81 KG/M2

## 2023-10-18 VITALS — DIASTOLIC BLOOD PRESSURE: 50 MMHG | SYSTOLIC BLOOD PRESSURE: 110 MMHG

## 2023-10-18 DIAGNOSIS — R53.1 WEAKNESS: ICD-10-CM

## 2023-10-18 PROCEDURE — 99442: CPT

## 2023-10-18 RX ORDER — PREDNISONE 10 MG/1
10 TABLET ORAL
Qty: 18 | Refills: 0 | Status: DISCONTINUED | COMMUNITY
Start: 2023-09-20 | End: 2023-10-18

## 2023-10-25 ENCOUNTER — APPOINTMENT (OUTPATIENT)
Dept: FAMILY MEDICINE | Facility: CLINIC | Age: 78
End: 2023-10-25
Payer: MEDICARE

## 2023-10-25 DIAGNOSIS — R39.9 UNSPECIFIED SYMPTOMS AND SIGNS INVOLVING THE GENITOURINARY SYSTEM: ICD-10-CM

## 2023-10-25 PROCEDURE — 99441: CPT

## 2023-10-25 RX ORDER — OMEPRAZOLE 40 MG/1
40 CAPSULE, DELAYED RELEASE ORAL
Qty: 180 | Refills: 3 | Status: ACTIVE | COMMUNITY
Start: 1900-01-01 | End: 1900-01-01

## 2023-11-01 ENCOUNTER — APPOINTMENT (OUTPATIENT)
Dept: FAMILY MEDICINE | Facility: CLINIC | Age: 78
End: 2023-11-01
Payer: MEDICARE

## 2023-11-01 PROCEDURE — 99214 OFFICE O/P EST MOD 30 MIN: CPT

## 2023-11-08 ENCOUNTER — APPOINTMENT (OUTPATIENT)
Dept: FAMILY MEDICINE | Facility: CLINIC | Age: 78
End: 2023-11-08
Payer: MEDICARE

## 2023-11-08 PROCEDURE — 99441: CPT

## 2023-11-08 RX ORDER — PRAVASTATIN SODIUM 10 MG/1
10 TABLET ORAL
Qty: 90 | Refills: 3 | Status: ACTIVE | COMMUNITY
Start: 2023-06-21 | End: 1900-01-01

## 2023-11-15 ENCOUNTER — APPOINTMENT (OUTPATIENT)
Dept: FAMILY MEDICINE | Facility: CLINIC | Age: 78
End: 2023-11-15
Payer: MEDICARE

## 2023-11-15 PROCEDURE — 99442: CPT

## 2023-11-22 ENCOUNTER — APPOINTMENT (OUTPATIENT)
Dept: FAMILY MEDICINE | Facility: CLINIC | Age: 78
End: 2023-11-22
Payer: MEDICARE

## 2023-11-22 PROCEDURE — 99442: CPT

## 2023-11-22 RX ORDER — SULFAMETHOXAZOLE AND TRIMETHOPRIM 800; 160 MG/1; MG/1
800-160 TABLET ORAL
Qty: 28 | Refills: 1 | Status: DISCONTINUED | COMMUNITY
Start: 2022-08-17 | End: 2023-11-22

## 2023-11-29 ENCOUNTER — APPOINTMENT (OUTPATIENT)
Dept: FAMILY MEDICINE | Facility: CLINIC | Age: 78
End: 2023-11-29
Payer: MEDICARE

## 2023-11-29 PROCEDURE — 99442: CPT

## 2023-12-04 RX ORDER — DOXYCYCLINE 100 MG/1
100 CAPSULE ORAL
Qty: 28 | Refills: 1 | Status: DISCONTINUED | COMMUNITY
Start: 2023-11-22 | End: 2023-12-04

## 2023-12-06 ENCOUNTER — APPOINTMENT (OUTPATIENT)
Dept: FAMILY MEDICINE | Facility: CLINIC | Age: 78
End: 2023-12-06
Payer: MEDICARE

## 2023-12-06 PROCEDURE — 99442: CPT

## 2023-12-13 ENCOUNTER — APPOINTMENT (OUTPATIENT)
Dept: FAMILY MEDICINE | Facility: CLINIC | Age: 78
End: 2023-12-13
Payer: MEDICARE

## 2023-12-13 PROCEDURE — 99442: CPT

## 2023-12-13 NOTE — HISTORY OF PRESENT ILLNESS
[Home] : at home, [unfilled] , at the time of the visit. [Medical Office: ([facility])___] : at the medical office located in  [Verbal consent obtained from patient] : the patient, [unfilled] [FreeTextEntry1] : Pt presents for a follow up on her sinus infection. [de-identified] : The patient telephoned and requested a call back to discuss their health.  The visit was performed over the telephone as the patient was unable to be seen in the office due to the COVID 19 pandemic.  She reports that she still has some sinus pressure and pain especially on the right side.  She is taking both antibiotics and is using both nasal sprays.  She had pain in her ears in the past when she used the neti pot.  Her weight is 96-97 pounds.  Her blood pressure is in the 112-125 range systolic and 60's diastolic.  Her pulse is in the 60-70 range.  The night sweats are about the same.

## 2023-12-13 NOTE — PLAN
[FreeTextEntry1] : She was advised to continue to take Bactrim DS bid and Cefuroxime 500 mg bid and to continue taking metoprolol ER 25 mg daily.  For the stable anxiety she will continue taking diazepam 5 mg bid prn and mirtazapine. She will follow up telephonically in 1 week or sooner prn.

## 2023-12-20 ENCOUNTER — APPOINTMENT (OUTPATIENT)
Dept: FAMILY MEDICINE | Facility: CLINIC | Age: 78
End: 2023-12-20
Payer: MEDICARE

## 2023-12-20 PROCEDURE — 99442: CPT

## 2023-12-20 RX ORDER — MIRTAZAPINE 7.5 MG/1
7.5 TABLET, FILM COATED ORAL
Qty: 60 | Refills: 5 | Status: ACTIVE | COMMUNITY
Start: 1900-01-01 | End: 1900-01-01

## 2023-12-20 NOTE — HISTORY OF PRESENT ILLNESS
[Home] : at home, [unfilled] , at the time of the visit. [Medical Office: ([facility])___] : at the medical office located in  [Verbal consent obtained from patient] : the patient, [unfilled] [FreeTextEntry1] : Pt presents for a follow up on her sinus infection.   [de-identified] : The patient telephoned and requested a call back to discuss their health.  The visit was performed over the telephone as the patient was unable to be seen in the office due to the COVID 19 pandemic.  She reports that she is feeling more energetic and less achy. She still has some pressure in the right side of her face.  She has had a little improvement with the night sweats.  She is eating well and weighs 99 pounds.  Her BP has been in the 110-120 range systolic for the most part, yesterday it was 141 and she took a lisinopril.  She reports that she is taking 7.5 mg of mirtazapine at night and it is really helping with her anxiety and depression and insomnia.

## 2023-12-20 NOTE — PLAN
[FreeTextEntry1] : She will continue to take Cefuroxime and Bactrim for the chronic sinusitis and will continue to take mirtazapine 7.5 mg at HS and diazepam 5-10 mg daily for the anxiety.  She will continue daily metoprolol ER 25 mg daily and lisinopril 2.5 mg daily as needed for the stable hypertension.  She will continue taking pravastatin 10 mg nightly for the hypercholesterolemia and levothyroxine 25 mcg daily and daily Miralax for the constipation and she will follow up in 1 week telephonically or sooner prn.

## 2023-12-27 ENCOUNTER — APPOINTMENT (OUTPATIENT)
Dept: FAMILY MEDICINE | Facility: CLINIC | Age: 78
End: 2023-12-27
Payer: MEDICARE

## 2023-12-27 VITALS — BODY MASS INDEX: 16.64 KG/M2 | WEIGHT: 100 LBS

## 2023-12-27 DIAGNOSIS — Z87.898 PERSONAL HISTORY OF OTHER SPECIFIED CONDITIONS: ICD-10-CM

## 2023-12-27 PROCEDURE — 99441: CPT

## 2023-12-27 RX ORDER — LISINOPRIL 2.5 MG/1
2.5 TABLET ORAL
Qty: 30 | Refills: 5 | Status: ACTIVE | COMMUNITY
Start: 2021-12-21 | End: 1900-01-01

## 2023-12-27 NOTE — HISTORY OF PRESENT ILLNESS
[Home] : at home, [unfilled] , at the time of the visit. [Medical Office: ([facility])___] : at the medical office located in  [FreeTextEntry1] : Pt presents for a follow up on the sinus infection and her blood pressure. [de-identified] : The patient telephoned and requested a call back to discuss their health.  The visit was performed over the telephone as the patient was unable to be seen in the office due to the COVID 19 pandemic.  She reports that her sinus pain and pressure is a little better than it was last week and her nasal discharge is white. She weighs 100 pounds. She has been feeling more energetic and will see her family this weekend.  Her BP has been more in the 130-140 range systolic.  She feels well emotionally and would like to renew diazepam.

## 2023-12-27 NOTE — PLAN
[FreeTextEntry1] : She will continue to monitor her blood pressure at home and take the lisinopril 2.5 mg daily for SBP of 140 or higher and will continue to take metoprolol ER 25 mg daily.  For the stable anxiety I checked I-STOP and renewed diazepam.  She will continue taking the Bactrim and Cefuroxime for the chronic sinusitis and will follow up telephonically in 1 week or sooner prn.

## 2023-12-31 ENCOUNTER — RX CHANGE (OUTPATIENT)
Age: 78
End: 2023-12-31

## 2024-01-01 NOTE — REVIEW OF SYSTEMS
Neonatology Daily Consult Note      Reason for Consult:   Patient Active Problem List   Diagnosis    Respiratory distress of     Thick meconium stained amniotic fluid    Meconium aspiration syndrome of     Single liveborn, born in hospital, delivered by vaginal delivery (CMD)    Micropenis    At risk for sepsis in     Low blood pressure reading    At risk for hyperbilirubinemia in        Consult Done Via: In Person      In-Patient Care Primary Care Provider: Pediatric Hospitalist - Dr. Romana Hassan      Overnight events: admitted after birth for resp distress needing NCPAP  - some issues with inconsistent BP s but resp status much improved    Came in to see baby because of low BP s but normal exam.  Baby has been improving resp wise and has only quiet tachypnea.  We gave the baby two 10 ml/kg saline boluses.     AFOSF  Lungs - CTA B/L  Heart- NSR no murmur, femorals _3++; post tibial -strong and positive 3++; brachial _3++; radial _3++  Cap refill < 2 secs in all four ext. And on trunk  Abd - soft benign  Neuro - quiet but responds well to cold hands and disturbance with crying and movments. No focal deficits noted. [place] + and symmetric     Repeated BP s with different machine and with different cuffs but still means of 24-30. One BP in L arm 72/49 mean of 54.     D/W martell at [facility]; who agrees this is puzzling; asked if u/o is good and it is at 2.2 ml/kg/h last 12h; so he agrees that we watch the baby for now; maybe consider ECHO in AM and further w/u if there are other S&S.  Asked also about ABX for MAS and he advised to stop after 36h if no other tangible reason to continue.     We will do another CBG to confirm there is no metabolic acidosis.          BLOOD GAS, CAPILLARY - RESPIRATORY [[id number]] (Abnormal) Collected: 24 234   Specimen: Blood, Capillary Updated: 24 2351     BASE EXCESS / DEFICIT, CAPILLARY - RESPIRATORY -2 mmol/L       CONDITION -  RESPIRATORY HFNC  4L, 21%     HCO3, CAPILLARY - RESPIRATORY 22 mmol/L       PCO2, CAPILLARY - RESPIRATORY 34 Low  mm Hg       PH, CAPILLARY - RESPIRATORY 7.41 Units       PO2, CAPILLARY - RESPIRATORY 50 High  mm Hg       O2 SATURATION, CAPILLARY - RESPIRATORY 85 Low  %       SITE - RESPIRATORY Capillary     TEMPERATURE - RESPIRATORY 37.0                7/22 - doing well; BP still on low side  7/23- BP s now wnl     Subjective :    Boy Rebecca Michaels is an Inborn   Term  3485 g (7 lb 10.9 oz) male infant admitted 2024 12:01 AM at Gestational Age: 39w6d now at age: 3 day old and whose birthday is 2023.     Corrected Gestational Age: 40w2d      Respiratory Settings Last Value   Nasal Cannula Flow 0.5 L/min (07/22/24 1700)    Mode     Rate     Peak Inspiratory Pressure     Tidal Volume     Inspiratory time     Pressure Support     PEEP/CPAC/EPAP     FiO2 21 % (07/22/24 1700)   Mean     Delta P     Hertz       Last Apnea:  ;    Intervention:      Last Bradycardia: length each event lasted (in sec) over the past 24 hours:  ;    Interventions:        Urine:  Voids recorded    Last Stool: 1 (07/22/24 1500)  Bms recorded; also had MSAF    Transcutaneous Bilirubin  TCB Result: 7.9 (07/23/24 0614)  TCB Site: Chest   Hours of age-Transcutaneous Biliribin: 78 Hrs    Labs (Last 24 hours)  Recent Results (from the past 24 hour(s))   Thyroid Stimulating Hormone    Collection Time: 07/22/24 10:03 AM   Result Value Ref Range    TSH 7.423 (H) 0.816 - 6.430 mcUnits/mL   Free T4    Collection Time: 07/22/24 10:03 AM   Result Value Ref Range    T4, Free 2.0 (H) 0.9 - 1.5 ng/dL   Basic Metabolic Panel    Collection Time: 07/22/24 10:03 AM   Result Value Ref Range    Fasting Status      Sodium 144 135 - 145 mmol/L    Potassium 4.5 3.5 - 6.0 mmol/L    Chloride 109 97 - 110 mmol/L    Carbon Dioxide 21 21 - 32 mmol/L    Anion Gap 19 7 - 19 mmol/L    Glucose 81 47 - 110 mg/dL    BUN 3 (L) 5 - 19 mg/dL    Creatinine 0.65 0.33 - 0.97  mg/dL    Glomerular Filtration Rate      BUN/Cr 5 (L) 7 - 25    Calcium 9.2 7.6 - 11.3 mg/dL        Labs above reviewed.    Objective     Vital signs reviewed  Visit Vitals  BP 63/36 (BP Location: LLE - Left lower extremity)   Pulse 132   Temp 99.1 °F (37.3 °C) (Axillary)   Resp (!) 74   Ht 20\" (50.8 cm)   Wt 3320 g (7 lb 5.1 oz)   HC 35 cm (13.78\")   SpO2 99%   BMI 12.87 kg/m²        Current Weight 3320 g (7 lb 5.1 oz) (07/23/24 0000)   Most recent weights:  Weight    07/20/24 0023 07/21/24 0230 07/22/24 0000 07/23/24 0000   Weight: 3485 g (7 lb 10.9 oz) 3525 g (7 lb 12.3 oz) 3390 g (7 lb 7.6 oz) 3320 g (7 lb 5.1 oz)     Weight Change Since Birth: -5%     Medications  Current Facility-Administered Medications   Medication    dextrose 10 % infusion        Physical Exam  Vitals signs reviewed.     The patient was examined by Neonatologist and peds hosp 2024      Physical Exam   GENERAL:Baby Boy is an alert, vigorous male with appropriate behavior. He is in no acute distress   SKIN: His skin is warm with normal turgor. The color of the skin is pink. There is no rash. There are no bruises or other signs of injury.  Significant jaundice is not present.  HEAD: The head is atraumatic and normocephalic. The anterior fontanel is open and flat.  EYES:deferred.  EARS: Pinnae normal.  NOSE: There is no nasal flaring, nares patent bilaterally.  THROAT:  The oropharynx is normal.  There is no cleft of the palate.  NECK: Clavicles without crepitus.  TRUNK AND THORAX: There are no lesions on the trunk; there is no dimple over the presacral area. There are no retractions.  LUNGS: The lung fields are clear to auscultation.  HEART: The precordium is quiet. The heart rhythm is grossly regular. S1 and S2 are normal. There are no murmurs. Normal femoral pulses, post. Tibial, brachial, radial all 3+. Cap refill < 2 secs all over body.  ABDOMEN: The umbilical cord stump is normal. There is not an umbilical hernia. The abdomen is flat  and soft.   GENITALIA: male genitalia; testis are in the scrotum  Phallus length 1.75cm; uretheral meatus noted on tip  RECTAL: anus patent  EXTREMITIES: Moving all 4 extremities. The hip exam is normal  . There are no hip clicks or clunks.  Bilateral Simian Creases.  NEUROLOGIC: He displays normal tone throughout. He is not jittery.      Assessment & Plan     Term  male infant at 39 6/7 weeks, now corrected to 40w2d      Patient Active Problem List   Diagnosis    Respiratory distress of     Thick meconium stained amniotic fluid    Meconium aspiration syndrome of     Single liveborn, born in hospital, delivered by vaginal delivery (CMD)    Micropenis    At risk for sepsis in     Low blood pressure reading    At risk for hyperbilirubinemia in        Former Gestational Age: 39w6d male infant, now corrected to 40w2d    Recommendations:    Thermoregulation:    Baby is under : Warmer      Fluids, Electrolytes and Nutrition:  Assessment:   Full term  infant  Feeds: MOM/DBM 35 ml q3h NG over 1.5 h; no more regurges; PO 0%  On IVF; BMP - wnl on      Plan: Feeds: MOM/DBM to 40 ml q3h NG over 1.5 h; increase feeds later in day; NG; attempt PO if RR < 65  Cont IVF  BMP on  - wnl  -    Respiratory System:  Assessment: Meconium Aspiration Syndrome  On NCPAP + 6, 21% since birth and weaned to HFNC 4 LPM 25% at 9 AM on .   CBG repeat - wnl   CXR - to martell does show bilateral opacities consistent with MAS R > L but the pneumothorax is questionable and may not be there.  4. - on HFNC 4 LPM; 23%; will start weaning            - 2 LPM 21%  CBG in PM on  - wnl   Off HFNC ; still mildly tachypneic    Plan: follow clinically    -    Apnea/Bradycardia/Desaturations:  Assessment: None    - Last Apnea:   ;    - Intervention:    - Last Bradycardia: length each event lasted (in sec) over the past 24 hours:  ;    - Interventions:        LSCPE:     Plan:  -    Cardio Vascular System:    Assessment: Low Bps - uncertain reason seems equipment related  7/21:     Came in to see baby because of low BP s but normal exam.  Baby has been improving resp wise and has only quiet tachypnea.  We gave the baby two 10 ml/kg saline boluses.     AFOSF  Lungs - CTA B/L  Heart- NSR no murmur, femorals _3++; post tibial -strong and positive 3++; brachial _3++; radial _3++  Cap refill < 2 secs in all four ext. And on trunk  Abd - soft benign  Neuro - quiet but responds well to cold hands and disturbance with crying and movments. No focal deficits noted. [place] + and symmetric     Repeated BP s with different machine and with different cuffs but still means of 24-30. One BP in L arm 72/49 mean of 54.     D/W martell at [facility]; who agrees this is puzzling; asked if u/o is good and it is at 2.2 ml/kg/h last 12h; so he agrees that we watch the baby for now; maybe consider ECHO in AM and further w/u if there are other S&S.  Asked also about ABX for MAS and he advised to stop after 36h if no other tangible reason to continue.     We will do another CBG to confirm there is no metabolic acidosis.          BLOOD GAS, CAPILLARY - RESPIRATORY [[id number]] (Abnormal) Collected: 07/20/24 2093   Specimen: Blood, Capillary Updated: 07/20/24 2355     BASE EXCESS / DEFICIT, CAPILLARY - RESPIRATORY -2 mmol/L       CONDITION - RESPIRATORY HFNC  4L, 21%     HCO3, CAPILLARY - RESPIRATORY 22 mmol/L       PCO2, CAPILLARY - RESPIRATORY 34 Low  mm Hg       PH, CAPILLARY - RESPIRATORY 7.41 Units       PO2, CAPILLARY - RESPIRATORY 50 High  mm Hg       O2 SATURATION, CAPILLARY - RESPIRATORY 85 Low  %       SITE - RESPIRATORY Capillary     TEMPERATURE - RESPIRATORY 37.0                Cardiac SCHO on 7/21- PFO only otherwise wnl  7/22- BP still 52/38; 52/32 mean 38 and baby continues to look well, CBG on 7/21 was wnl, good u/o, good pulses 3+ everywhere so this is puzzling. Spoken again to martell at [facility] who is also at a loss for an explanation as it  all doesn't make sense; he suggested I check with cardiology so will see what they say.   - BP s wnl now in new area in ICN. Vipin spoke to cardio on  who agreed that a thorough w/u had been done for the borderline BP in an otherwise normal looking baby     Plan:  - Follow BP s per policy - issue appears resolved     HEENT:   Assessment: None  -     Plan:  - No active issues     Gastrointestinal System:  Assessment: None  -     Plan:  - No active issues     Renal System:  Assessment: None  -     Plan:  - No active issues    Hematology:  Assessment: None    Baby's blood type is O Rh Negative;  Briseida: Negative  Maternal blood type is   Information for the patient's mother:  Rebecca Michaels [[id number]]   O Rh Positive   Last Bilirubin:   Bilirubin, Total (mg/dL)   Date Value   2024     Last Hg:   HGB (g/dL)   Date Value   2024      - 5.7   Tcb 9.1  - TcB 7.9 decreased     Plan: follow policy   -    Infectious Disease:  Assessment:  resp distress/ MAS  -     Early onset sepsis screen:  Gestational Age: 39w6d  Highest Maternal Antepartum Temp (F): 98.4  Rupture of Membranes (Hours): 9.7 hours          Risk at Birth: 0.11  Risk - Well Appearin.05  Risk - Equivocal: 0.57  Risk - Clinical Illness: 2.41    Clinical Exam:  Clinical Illness - Persistent need for NCPAP / HFNC / mechanical ventilation (outside of the delivery room)    Plan:  - Clinical Illness and any EOS Risk: Blood Culture, CBC, Empiric Antibiotics and Vitals per NICU  - Blood culture - staph species and CBC - wnl on admission - Yes  - Baby was started on Ampicillin/Gentamicin x 36 hours pending Blood Cultures.  Rpt CBC in PM to follow plt count  PM 158k  Rpt BC sent on  AM.   update: Feel BC from  AM is a contaminant growing staph species - peds hosp called micro and then confirm skin staph epi coag neg. Staph epi identified.  BC From  - NGTD so will stop amp/gent      Endocrine  System:  Assessment:  Penis 1.75cm  Testis in scrotum  Uretheral meatus at tip of phallus       Plan: Microarray  GH, TSH, freeT4, LH, testosterone. peds hosp spoke  to peds shruthi at [facility]  Peds hosp will discusss again with peds endo after all results are back.  Reassure mom    - TSH, Free T4, LH, GH, Testosterone, Sex binding globulin sent  Will send micorarray in the next day or two hopefully scheduled for       Central Nervous System:  Assessment: None  -     Plan:     - No active issues    Ophthalmology:  Assessment: None      Plan:   - No active issues      Genetics:  Assessment: None  -    Plan:        Musculo Skeletal:  Assessment: None  -    Plan:      Skin:  Assessment:  -    Plan:      Other:       Therapies:       Screenings & Procedures     1) Immunizations:   Most Recent Immunizations   Administered Date(s) Administered    Hep B, adolescent or pediatric 2024       2)  Hearing Test:      3) ROP Eye Exam Needed?:   No    4) Car Seat Screen:      5) CCHD Screening:   Screening complete:    Right hand reading %:    Foot reading %:    CHD:  (echo performed )    6) Circumcision: Not appropriate at this time (24 0230)    7) [place] Screen- date drawn (most recent results): 24 (24 1800)      Last 3 results: 24 (24 1800)      NBS at admission: Date:       NBS at 48-72 HOL:  Date :      NBS at 28 DOL or prior to Discharge: Date:     8) Is patient a Synagis Candidate:   ( if yes, see Immunizations above for date of administration)      Assessment: Being actively managed for the following Problem List:  2024: At risk for hyperbilirubinemia in   2024: At risk for sepsis in   2024: Low blood pressure reading  2024: Respiratory distress of   2024: Thick meconium stained amniotic fluid  2024: Meconium aspiration syndrome of   2024: Single liveborn, born in hospital, delivered by vaginal   delivery  (CMD)  2024-07: Micropenis        I have spoken with the Pediatric Hospitalist and Nursing Staff  and reviewed findings and plan of management.    The infants current condition and plan of management was updated with mother at the bedside by Pediatric Hospitalist and Neonatologist on 7/23  Total time spend on this Consult was 30 mins, of which 20 mins spent on direct patient care.    Sudhakar Quiroz MD,   2024   9:16 AM    [Recent Change In Weight] : ~T recent weight change [Nasal Discharge] : nasal discharge [Postnasal Drip] : postnasal drip [Anxiety] : anxiety [Negative] : Heme/Lymph [Fever] : no fever [Chills] : no chills [Fatigue] : no fatigue [Abdominal Pain] : no abdominal pain [Constipation] : no constipation [Diarrhea] : diarrhea [Suicidal] : not suicidal [Insomnia] : no insomnia [Depression] : no depression

## 2024-01-03 ENCOUNTER — APPOINTMENT (OUTPATIENT)
Dept: FAMILY MEDICINE | Facility: CLINIC | Age: 79
End: 2024-01-03
Payer: MEDICARE

## 2024-01-03 PROCEDURE — 99442: CPT

## 2024-01-03 NOTE — HISTORY OF PRESENT ILLNESS
[Home] : at home, [unfilled] , at the time of the visit. [Medical Office: ([facility])___] : at the medical office located in  [Verbal consent obtained from patient] : the patient, [unfilled] [FreeTextEntry1] : Pt presents for a follow up on the blood pressure and sinus infection. [de-identified] : The patient telephoned and requested a call back to discuss their health.  The visit was performed over the telephone as the patient was unable to be seen in the office due to the COVID 19 pandemic.  She reports that she still has some sinus congestion and white/yellow discharge but feels that she's slowly improving.  Her BP has been in the 110-120/60 range.  Her weight is 102 as of this morning.

## 2024-01-03 NOTE — PLAN
[FreeTextEntry1] : She was advised to continue taking Bactrim DS and Cefuroxime and she will continue use of the nasal sprays. She will continue monitoring her blood pressure at home and will continue daily metoprolol and as needed lisinopril and will follow up telephonically in 1 week or sooner prn.

## 2024-01-10 ENCOUNTER — APPOINTMENT (OUTPATIENT)
Dept: FAMILY MEDICINE | Facility: CLINIC | Age: 79
End: 2024-01-10
Payer: MEDICARE

## 2024-01-10 DIAGNOSIS — K58.2 MIXED IRRITABLE BOWEL SYNDROME: ICD-10-CM

## 2024-01-10 PROCEDURE — 99442: CPT

## 2024-01-10 NOTE — HISTORY OF PRESENT ILLNESS
[Home] : at home, [unfilled] , at the time of the visit. [Medical Office: ([facility])___] : at the medical office located in  [Verbal consent obtained from patient] : the patient, [unfilled] [FreeTextEntry1] : Pt presents for a follow up on the sinus infection. [de-identified] : The patient telephoned and requested a call back to discuss their health.  The visit was performed over the telephone as the patient was unable to be seen in the office due to the COVID 19 pandemic.  She reports that she has a little more sinus pressure and would like a rx for prednisone.  Her weight is 101.  Her BP has been in the 140's systolic a few times and it goes down with the Lisinopril.  The night sweats are about the same.  She has been a little more anxious.

## 2024-01-10 NOTE — PLAN
[FreeTextEntry1] : She was advised to continue to monitor her blood pressures at home.  She was advised to take prednisone as directed and I renewed Cefuroxime.  She was given emotional support today and will continue all other medications without change.

## 2024-01-17 ENCOUNTER — APPOINTMENT (OUTPATIENT)
Dept: FAMILY MEDICINE | Facility: CLINIC | Age: 79
End: 2024-01-17
Payer: MEDICARE

## 2024-01-17 PROCEDURE — 99442: CPT

## 2024-01-17 NOTE — HISTORY OF PRESENT ILLNESS
[Home] : at home, [unfilled] , at the time of the visit. [Medical Office: ([facility])___] : at the medical office located in  [Verbal consent obtained from patient] : the patient, [unfilled] [FreeTextEntry1] : Pt presents for a follow up on the sinus infection and her night sweats. [de-identified] : The patient telephoned and requested a call back to discuss their health.  The visit was performed over the telephone as the patient was unable to be seen in the office due to the COVID 19 pandemic.  Her BP this morning was 140 today. Yesterday it was 160 in the morning, and she took a lisinopril and then it went down to 125.  She is taking the prednisone and the sinus pain and pressure is slowly improving.  She slept well last night.  Most of her blood pressure readings are in the 120/70 range.  Her weight is 103.

## 2024-01-17 NOTE — PLAN
[FreeTextEntry1] : She will continue taking Bactrim and Cefuroxime and she will finish the prednisone rx.  For the stable anxiety and insomnia she will continue taking mirtazapine and diazepam.  She was given emotional support today and will follow up telephonically in 1 week or sooner prn.

## 2024-01-24 ENCOUNTER — APPOINTMENT (OUTPATIENT)
Dept: FAMILY MEDICINE | Facility: CLINIC | Age: 79
End: 2024-01-24
Payer: MEDICARE

## 2024-01-24 PROCEDURE — 99442: CPT

## 2024-01-24 NOTE — HISTORY OF PRESENT ILLNESS
[Home] : at home, [unfilled] , at the time of the visit. [Medical Office: ([facility])___] : at the medical office located in  [Verbal consent obtained from patient] : the patient, [unfilled] [FreeTextEntry1] : Pt presents for a follow up on the night sweats and sinus infection. [de-identified] : The patient telephoned and requested a call back to discuss their health.  The visit was performed over the telephone as the patient was unable to be seen in the office due to the COVID 19 pandemic.  With the recent weather change she was more achy.  She still has a little pain in the right side of her face.  She has an ongoing postnasal drip. Her weight is 105 pounds.  She is eating better.  Her blood pressure is mostly in the 120/70 range.  She is still getting some night sweats but they have been tolerable.

## 2024-01-24 NOTE — PLAN
[FreeTextEntry1] : For the improving chronic sinusitis she will continue taking Cefuroxime and Bactrim.  She will continue taking metoprolol ER 25 mg daily and Lisinopril 2.5 mg daily if the SBP is greater than 140.

## 2024-01-31 ENCOUNTER — APPOINTMENT (OUTPATIENT)
Dept: FAMILY MEDICINE | Facility: CLINIC | Age: 79
End: 2024-01-31
Payer: MEDICARE

## 2024-01-31 PROCEDURE — 99442: CPT

## 2024-01-31 NOTE — HISTORY OF PRESENT ILLNESS
[Home] : at home, [unfilled] , at the time of the visit. [Medical Office: ([facility])___] : at the medical office located in  [Verbal consent obtained from patient] : the patient, [unfilled] [FreeTextEntry1] : Pt presents for a follow up on her sinus infection and blood pressure. [de-identified] : The patient telephoned and requested a call back to discuss their health.  The visit was performed over the telephone as the patient was unable to be seen in the office due to the COVID 19 pandemic.  Her BP last Saturday was elevated and she took a dose of lisinopril and it went down.  Since then her blood pressures have been in the 120/70 range. She had bad night sweats last Friday night but they are better now. She has a little less facial swelling and sinus pain and pressure.

## 2024-01-31 NOTE — PLAN
[FreeTextEntry1] : For the stable hypertension she will continue taking metoprolol ER 25 mg daily and add lisinopril if the SBP is 140 or higher.  I-STOP was checked and I renewed diazepam 5 mg bid prn.  She will continue to monitor her blood pressure at home and for now she will continue taking Bactrim and Cefuroxime for the chronic sinus infection.

## 2024-02-07 ENCOUNTER — APPOINTMENT (OUTPATIENT)
Dept: FAMILY MEDICINE | Facility: CLINIC | Age: 79
End: 2024-02-07
Payer: MEDICARE

## 2024-02-07 PROCEDURE — 99442: CPT

## 2024-02-07 NOTE — HISTORY OF PRESENT ILLNESS
[Home] : at home, [unfilled] , at the time of the visit. [Medical Office: ([facility])___] : at the medical office located in  [Verbal consent obtained from patient] : the patient, [unfilled] [FreeTextEntry1] : Pt presents for a follow up on her blood pressure and the sinus infection. [de-identified] : The patient telephoned and requested a call back to discuss their health.  The visit was performed over the telephone as the patient was unable to be seen in the office due to the COVID 19 pandemic.  She still has sinus congestion and pressure but it is slowly improving.  She still has body aches on and off.  Her weight is fluctuating between 104-105 pounds.  Her BP has been in the 110-130 range systolic but on a few occasions it was over 140 and she took lisinopril.  The diastolic BP has been in the 60-70 range.  She still has the night sweats but they haven't been too severe.  She is asking about taking lion's johny mushroom extract for her memory.   Yes

## 2024-02-07 NOTE — PLAN
[FreeTextEntry1] : For the chronic sinusitis she will continue taking Bactrim and Cefuroxime.  She will continue taking metoprolol daily for the stable hypertension and the lisinopril as needed for SBP of 140 or higher. For the stable anxiety and depression she will continue taking mirtazapine and diazepam.  She will follow up telephonically in 1 week or sooner prn.

## 2024-02-14 ENCOUNTER — APPOINTMENT (OUTPATIENT)
Dept: FAMILY MEDICINE | Facility: CLINIC | Age: 79
End: 2024-02-14
Payer: MEDICARE

## 2024-02-14 DIAGNOSIS — K59.09 OTHER CONSTIPATION: ICD-10-CM

## 2024-02-14 PROCEDURE — 99442: CPT

## 2024-02-14 RX ORDER — ALBUTEROL SULFATE 90 UG/1
108 (90 BASE) INHALANT RESPIRATORY (INHALATION)
Qty: 1 | Refills: 5 | Status: ACTIVE | COMMUNITY
Start: 2018-05-08 | End: 1900-01-01

## 2024-02-14 NOTE — HISTORY OF PRESENT ILLNESS
[Home] : at home, [unfilled] , at the time of the visit. [Medical Office: ([facility])___] : at the medical office located in  [Verbal consent obtained from patient] : the patient, [unfilled] [FreeTextEntry1] : Pt presents for a follow up on the sinus infection and night sweats and blood pressure. [de-identified] : The patient telephoned and requested a call back to discuss their health.  The visit was performed over the telephone as the patient was unable to be seen in the office due to the COVID 19 pandemic.  She reports that her weight is 105 pounds.  Her BP is running in the 120/70 range.  She still has some pain in the right side of her face on and off.  She is still having the night sweats on and off.  She reports that the constipation is under very good control.  She hasn't had any recent nausea.  She had her pacemaker checked on 2/12.

## 2024-02-14 NOTE — PLAN
[FreeTextEntry1] : She was advised to continue taking her current medications and to continue monitoring her blood pressure.  She wants to defer lab testing until next month.  She was given emotional support today and will follow up telephonically in 1 week or sooner prn.

## 2024-02-16 RX ORDER — OLOPATADINE HCL 1 MG/ML
0.1 SOLUTION/ DROPS OPHTHALMIC TWICE DAILY
Qty: 1 | Refills: 6 | Status: ACTIVE | COMMUNITY
Start: 2023-04-19

## 2024-02-21 ENCOUNTER — APPOINTMENT (OUTPATIENT)
Dept: FAMILY MEDICINE | Facility: CLINIC | Age: 79
End: 2024-02-21
Payer: MEDICARE

## 2024-02-21 PROCEDURE — 99442: CPT

## 2024-02-21 RX ORDER — PROCHLORPERAZINE MALEATE 10 MG/1
10 TABLET ORAL EVERY 8 HOURS
Qty: 10 | Refills: 3 | Status: ACTIVE | COMMUNITY
Start: 2020-07-27 | End: 1900-01-01

## 2024-02-21 NOTE — HISTORY OF PRESENT ILLNESS
[Home] : at home, [unfilled] , at the time of the visit. [Medical Office: ([facility])___] : at the medical office located in  [Verbal consent obtained from patient] : the patient, [unfilled] [FreeTextEntry1] : Pt presents for a follow up on the sinus congestion. [de-identified] : The patient telephoned and requested a call back to discuss their health.  The visit was performed over the telephone as the patient was unable to be seen in the office due to the COVID 19 pandemic.  For the past 5 days she has been having flu like body aches and feels a little nauseous and more congested.  She would like to be tested for Covid.  She would like to resume prednisone.  She hasn't lost weight.  Her blood pressures have been in the 120-140/60-70 range.

## 2024-02-21 NOTE — PLAN
[FreeTextEntry1] : I ordered a home Covid PCR test to rule out Covid and also ordered labs to follow up on the hypothyroidism, hyperlipidemia and to further assess her general health. She will take prednisone 40 mg daily for 2 days then 30 mg daily for 2 days then 20 mg daily for 2 days then 10 mg daily for 2 days then stop and will also take compazine 10 mg every 8 hours as needed. She will continue monitoring her blood pressure at home and will continue taking lisinopril 2.5 mg if the SBP is 140 or higher and will continue taking metoprolol ER 25 mg daily.  She will be notified with all test results and will follow up telephonically in 1 week or sooner prn.

## 2024-02-22 ENCOUNTER — LABORATORY RESULT (OUTPATIENT)
Age: 79
End: 2024-02-22

## 2024-02-23 DIAGNOSIS — R05.9 COUGH, UNSPECIFIED: ICD-10-CM

## 2024-02-28 ENCOUNTER — APPOINTMENT (OUTPATIENT)
Dept: FAMILY MEDICINE | Facility: CLINIC | Age: 79
End: 2024-02-28
Payer: MEDICARE

## 2024-02-28 PROCEDURE — 99442: CPT

## 2024-02-28 RX ORDER — MOMETASONE FUROATE 1 MG/G
0.1 CREAM TOPICAL DAILY
Qty: 1 | Refills: 1 | Status: ACTIVE | COMMUNITY
Start: 2023-03-15 | End: 1900-01-01

## 2024-02-28 NOTE — HISTORY OF PRESENT ILLNESS
[Home] : at home, [unfilled] , at the time of the visit. [Medical Office: ([facility])___] : at the medical office located in  [Verbal consent obtained from patient] : the patient, [unfilled] [FreeTextEntry1] : Pt presents for a follow up for the sinus infection and weakness. [de-identified] : The patient telephoned and requested a call back to discuss their health.  The visit was performed over the telephone as the patient was unable to be seen in the office due to the COVID 19 pandemic.  She still has some sinus pain and pressure and has white nasal discharge.  She was able to start the prednisone on 2/26.  Her weight is 105.  She still has night sweats on and off.  She is drinking muscle milk with iron.  Her blood pressure has been in the 110-130/60-70 range.

## 2024-02-28 NOTE — PLAN
[FreeTextEntry1] : She was advised to continue the prednisone and the antibiotics.  We will continue the 25 mcg dose of levothyroxine and will recheck labs in 2 months.

## 2024-03-04 ENCOUNTER — APPOINTMENT (OUTPATIENT)
Dept: FAMILY MEDICINE | Facility: CLINIC | Age: 79
End: 2024-03-04
Payer: MEDICARE

## 2024-03-04 PROCEDURE — 99441: CPT

## 2024-03-04 NOTE — PLAN
[FreeTextEntry1] : She was advised to try to take a nap and to check her blood pressure again later this afternoon and if the SBP is greater than 140 to take another dose of lisinopril and to call me. She will also start drinking soy milk daily for the sweats and will follow up in 2 days for our regular visit or sooner prn.

## 2024-03-04 NOTE — HISTORY OF PRESENT ILLNESS
[Home] : at home, [unfilled] , at the time of the visit. [Medical Office: ([facility])___] : at the medical office located in  [Verbal consent obtained from patient] : the patient, [unfilled] [FreeTextEntry8] : The patient telephoned and requested a call back to discuss their health.  The visit was performed over the telephone as the patient was unable to be seen in the office due to the COVID 19 pandemic.  She didn't sleep well with the night sweats and didn't feel well during the night.  She had a headache and felt out of breath. At 2:00 this morning her BP was 163/80 and she took 2.5 mg of lisinopril.  She checked her blood pressure again at 8:30 this morning and it was 141/78 and she took another dose of lisinopril then.  Her blood pressure at 10:00 am was down to 138/69 and at 11:15 it was up to 146/72.

## 2024-03-06 ENCOUNTER — APPOINTMENT (OUTPATIENT)
Dept: FAMILY MEDICINE | Facility: CLINIC | Age: 79
End: 2024-03-06
Payer: MEDICARE

## 2024-03-06 PROCEDURE — 99442: CPT

## 2024-03-06 NOTE — PLAN
[FreeTextEntry1] : She was advised to continue to monitor her BP and to take the metoprolol ER 25 mg daily and the additional lisinopril 2.5 mg if the SBP is 140 or higher.  For the stable anxiety I checked I-STOP and renewed diazepam 5 mg bid prn and she will continue taking mirtazapine 7.5 mg nightly.  She will continue taking the cefuroxime and the Bactrim for the chronic sinusitis.

## 2024-03-06 NOTE — HISTORY OF PRESENT ILLNESS
[Home] : at home, [unfilled] , at the time of the visit. [Medical Office: ([facility])___] : at the medical office located in  [Verbal consent obtained from patient] : the patient, [unfilled] [de-identified] : The patient telephoned and requested a call back to discuss their health.  The visit was performed over the telephone as the patient was unable to be seen in the office due to the COVID 19 pandemic.  She is still monitoring her blood pressure at home and it is still fluctuating. The monitor reported that it was irregular and she spoke with someone at the cardiology office.  She has a cardiology appointment next month.  Today her BP is 126/73 and 129/73 with a pulse rate in the 80's.  Yesterday her BP was 146/84 in the early morning and she took the lisinopril and then it was still elevated and she took a second lisinopril.  She slept well last night.  She still has some discomfort in the right side of her face and is still having some nasal discharge. Her weight is 105-106. [FreeTextEntry1] : Pt presents for a follow up on her sinus infection and fatigue and night sweats.

## 2024-03-13 ENCOUNTER — APPOINTMENT (OUTPATIENT)
Dept: FAMILY MEDICINE | Facility: CLINIC | Age: 79
End: 2024-03-13
Payer: MEDICARE

## 2024-03-13 PROCEDURE — 99442: CPT

## 2024-03-13 NOTE — PLAN
[FreeTextEntry1] : She was advised to continue to monitor her blood pressure at home and to continue taking the metoprolol ER 25 mg daily and the lisinopril 2.5 mg daily if the SBP is greater than or equal to 140. I plan to recheck labs in about a month.  I renewed Bactrim and she will continue taking it along with the cefuroxime for the chronic sinusitis.  She was given emotional support throughout the visit and will follow up telephonically in 1 week or sooner prn.

## 2024-03-13 NOTE — HISTORY OF PRESENT ILLNESS
[Home] : at home, [unfilled] , at the time of the visit. [Medical Office: ([facility])___] : at the medical office located in  [Verbal consent obtained from patient] : the patient, [unfilled] [FreeTextEntry1] : Pt presents for a follow up on the sinus infection and the night sweats and fatigue. [de-identified] : The patient telephoned and requested a call back to discuss their health.  The visit was performed over the telephone as the patient was unable to be seen in the office due to the COVID 19 pandemic.  She is sleeping a little better but feels tired during the day.  She is drinking muscle milk iron and is drinking soy milk with iron.  She is concerned about the anemia. Her blood pressure readings are in the 120-130/60-70 range.  Her weight is in the 105-106 range.

## 2024-03-20 ENCOUNTER — APPOINTMENT (OUTPATIENT)
Dept: FAMILY MEDICINE | Facility: CLINIC | Age: 79
End: 2024-03-20
Payer: MEDICARE

## 2024-03-20 PROCEDURE — 99442: CPT

## 2024-03-20 RX ORDER — LEVOTHYROXINE SODIUM 0.03 MG/1
25 TABLET ORAL
Qty: 90 | Refills: 3 | Status: ACTIVE | COMMUNITY
Start: 2017-05-09 | End: 1900-01-01

## 2024-03-20 NOTE — PLAN
[FreeTextEntry1] : She was given encouragement and emotional support.  She will continue the protein drink which contains iron and I plan to repeat the labs toward the end of April. She will continue monitoring her blood pressure and will continue taking metoprolol ER 25 mg daily and lisinopril 2.5 mg daily if the SBP is 140 or higher.  For the chronic sinusitis she will continue taking Bactrim bid and Cefuroxime 500 mg bid.  For the anxiety she will continue taking mirtazapine 7.5 mg at HS and diazepam 5 mg bid prn.  She will continue the levothyroxine 25 mcg daily until the next lab draw.

## 2024-03-20 NOTE — HISTORY OF PRESENT ILLNESS
[Medical Office: ([facility])___] : at the medical office located in  [Home] : at home, [unfilled] , at the time of the visit. [Verbal consent obtained from patient] : the patient, [unfilled] [FreeTextEntry1] : Pt presents for a follow up on the hypertension, weakness and sinus congestion. [de-identified] : The patient telephoned and requested a call back to discuss their health.  The visit was performed over the telephone as the patient was unable to be seen in the office due to the COVID 19 pandemic.  Today she feels more tired and a little weak.  She feels more stiff and weak in the mornings but then when she moves around it feels a little better.  She has shortness of breath at times.  Her blood pressure has been in the 120-130/70-80 range and her heart rate is in the 70-90 range.  Her weight is still 105. She still has some discomfort in the right side of her face.  She has postnasal drip and coughs up clear or white mucous.

## 2024-03-27 ENCOUNTER — APPOINTMENT (OUTPATIENT)
Dept: FAMILY MEDICINE | Facility: CLINIC | Age: 79
End: 2024-03-27
Payer: MEDICARE

## 2024-03-27 PROCEDURE — 99442: CPT

## 2024-03-27 NOTE — HISTORY OF PRESENT ILLNESS
[Home] : at home, [unfilled] , at the time of the visit. [Medical Office: ([facility])___] : at the medical office located in  [Verbal consent obtained from patient] : the patient, [unfilled] [FreeTextEntry1] : She presents for a follow up on the sinus infection, night sweats and weakness. [de-identified] : The patient telephoned and requested a call back to discuss their health.  The visit was performed over the telephone as the patient was unable to be seen in the office due to the COVID 19 pandemic.  She reports that she still has some sinus congestion and postnasal drip especially in the mornings.  The weakness is worse in the mornings and then gets better as the day goes on.  Her weight is 106.  Her blood pressure is primarily in the 110-120/60-70 range.

## 2024-03-27 NOTE — PLAN
[FreeTextEntry1] : For the stable hypertension she will continue to take metoprolol ER 25 mg daily and lisinopril 2.5 mg if the systolic blood pressure is 140 or higher.  For the chronic sinusitis she will continue taking cefuroxime 500 mg twice daily and Bactrim DS 1 tab p.o. twice daily.  For the stable anxiety she will continue to take diazepam 5 mg twice a day as needed.  She will continue efforts to increase her physical activity and I do plan to recheck the labs for anemia and the underactive thyroid and the cholesterol at some point next month.  She will follow-up telephonically in 1 week or sooner if needed.

## 2024-04-03 ENCOUNTER — APPOINTMENT (OUTPATIENT)
Dept: FAMILY MEDICINE | Facility: CLINIC | Age: 79
End: 2024-04-03
Payer: MEDICARE

## 2024-04-03 PROCEDURE — 99441: CPT

## 2024-04-03 NOTE — HISTORY OF PRESENT ILLNESS
[Home] : at home, [unfilled] , at the time of the visit. [Medical Office: ([facility])___] : at the medical office located in  [Verbal consent obtained from patient] : the patient, [unfilled] [de-identified] : The patient telephoned and requested a call back to discuss their health.  The visit was performed over the telephone as the patient was unable to be seen in the office due to the COVID 19 pandemic.  She still has some discomfort in the right side of her face.  She has postnasal drip and some nasal discharge and sneezing.  She still has the night sweats.  She feels a little less fatigued.  Her weight is 106 pounds.  Her blood pressure is in the 120-130/70 range. She is having regular bowel movements. [FreeTextEntry1] : Pt presents for a follow up on the sinus infection and for her weakness.

## 2024-04-03 NOTE — PLAN
[FreeTextEntry1] : She will continue taking Bactrim and Cefuroxime for the chronic sinus infection. For the stable hypertension she will continue taking metoprolol ER 25 mg daily and lisinopril 2.5 mg for SBP of 140 or higher. She will continue the diazepam and mirtazapine and will follow up telephonically in 5 days or sooner prn.

## 2024-04-08 ENCOUNTER — APPOINTMENT (OUTPATIENT)
Dept: FAMILY MEDICINE | Facility: CLINIC | Age: 79
End: 2024-04-08
Payer: MEDICARE

## 2024-04-08 ENCOUNTER — RX CHANGE (OUTPATIENT)
Age: 79
End: 2024-04-08

## 2024-04-08 DIAGNOSIS — B36.9 SUPERFICIAL MYCOSIS, UNSPECIFIED: ICD-10-CM

## 2024-04-08 PROCEDURE — 99441: CPT

## 2024-04-08 NOTE — PLAN
[FreeTextEntry1] : I renewed the nystatin/triamcinolone cream. I also checked I-STOP and renewed diazepam 5 mg bid prn. She will continue taking bactrim and cefuroxime for the chronic sinusitis for now and will continue to monitor her blood pressure daily.

## 2024-04-08 NOTE — HISTORY OF PRESENT ILLNESS
[Home] : at home, [unfilled] , at the time of the visit. [Medical Office: ([facility])___] : at the medical office located in  [Verbal consent obtained from patient] : the patient, [unfilled] [FreeTextEntry1] : Pt presents for a follow up on her sinus infection and anxiety. [de-identified] : The patient telephoned and requested a call back to discuss their health.  The visit was performed over the telephone as the patient was unable to be seen in the office due to the COVID 19 pandemic.  She is still having some sweats during the night but some nights she is able to sleep better.  Her blood pressure was high once after having a bad nightmare.  She still has some sinus congestion and now is sneezing due to her allergies.  She feels a little less fatigued.  She is still a little achy in the morning but then it gets better when she moves around. Most of her blood pressure readings are in the 120/60-70 range.   She would like to renew diazepam and Mycolog cream. She has a rash in the vaginal area.

## 2024-04-11 RX ORDER — CLOTRIMAZOLE AND BETAMETHASONE DIPROPIONATE 10; .5 MG/G; MG/G
1-0.05 CREAM TOPICAL TWICE DAILY
Qty: 1 | Refills: 3 | Status: ACTIVE | COMMUNITY
Start: 2024-04-11 | End: 1900-01-01

## 2024-04-17 ENCOUNTER — APPOINTMENT (OUTPATIENT)
Dept: FAMILY MEDICINE | Facility: CLINIC | Age: 79
End: 2024-04-17
Payer: MEDICARE

## 2024-04-17 VITALS — BODY MASS INDEX: 17.64 KG/M2 | WEIGHT: 106 LBS

## 2024-04-17 PROCEDURE — 99442: CPT

## 2024-04-17 RX ORDER — NYSTATIN AND TRIAMCINOLONE ACETONIDE 100000; 1 MG/G; MG/G
100000-0.1 CREAM TOPICAL TWICE DAILY
Qty: 1 | Refills: 3 | Status: DISCONTINUED | COMMUNITY
Start: 2022-03-30 | End: 2024-04-17

## 2024-04-17 RX ORDER — NYSTATIN AND TRIAMCINOLONE ACETONIDE 100000; 1 MG/G; MG/G
100000-0.1 CREAM TOPICAL TWICE DAILY
Qty: 1 | Refills: 3 | Status: DISCONTINUED | COMMUNITY
Start: 2024-04-08 | End: 2024-04-17

## 2024-04-17 NOTE — HISTORY OF PRESENT ILLNESS
[Home] : at home, [unfilled] , at the time of the visit. [Medical Office: ([facility])___] : at the medical office located in  [Verbal consent obtained from patient] : the patient, [unfilled] [FreeTextEntry1] : Pt presents for a follow up on her blood pressure and her weight and her sinus symptoms.   [de-identified] : The patient telephoned and requested a call back to discuss their health.  The visit was performed over the telephone as the patient was unable to be seen in the office due to the COVID 19 pandemic.  She reports that she still has some pain from the sinus infection and some sinus congestion.  The night sweats are about the same.  Her weight is in the 106-107 range.  Her BP is in the 120/60-80 range except for 1 day when the systolic was 152.  She saw the cardiologist last week and no changes were made.

## 2024-04-17 NOTE — PLAN
[FreeTextEntry1] : She was advised to continue current medications and monitoring of her blood pressure and weight.  Her anxiety and insomnia are under good control.

## 2024-04-24 ENCOUNTER — APPOINTMENT (OUTPATIENT)
Dept: FAMILY MEDICINE | Facility: CLINIC | Age: 79
End: 2024-04-24
Payer: MEDICARE

## 2024-04-24 PROCEDURE — 99442: CPT

## 2024-04-24 NOTE — PLAN
[FreeTextEntry1] : She was advised to continue to monitor her blood pressure at home and to continue taking metoprolol ER 25 mg daily and lisinopril 2.5 mg if the SBP is 140 or higher.  She will continue all other medications without change.  She was advised to consider coming into the office for a visit and said she will try to do that in June.

## 2024-04-24 NOTE — HISTORY OF PRESENT ILLNESS
[Home] : at home, [unfilled] , at the time of the visit. [Medical Office: ([facility])___] : at the medical office located in  [Verbal consent obtained from patient] : the patient, [unfilled] [de-identified] : The patient telephoned and requested a call back to discuss their health.  The visit was performed over the telephone as the patient was unable to be seen in the office due to the COVID 19 pandemic.  She has been monitoring her blood pressure at home and this morning it was 146 systolic but other than that it's been in the 120/70 range.  She still has slight pressure in the right side of her face and a scratchy throat and itchy eyes at times.  The night sweats still come and go.  Her weight is 107-108. [FreeTextEntry1] : Pt presents for a follow up on her night sweats and sinus infection and her blood pressure.

## 2024-05-01 ENCOUNTER — APPOINTMENT (OUTPATIENT)
Dept: FAMILY MEDICINE | Facility: CLINIC | Age: 79
End: 2024-05-01
Payer: MEDICARE

## 2024-05-01 PROCEDURE — 99442: CPT

## 2024-05-01 NOTE — PLAN
[FreeTextEntry1] : She will continue taking metoprolol ER 25 mg once daily for the high blood pressure and will continue taking lisinopril 2.5 mg if the systolic is 140 or higher.  She will continue taking cefuroxime and Bactrim DS for the chronic sinusitis and will continue use of the nasal sprays.  She will continue to monitor her blood pressure and weight at home and she will continue the supplements for the night sweats.  She will follow-up telephonically with me in 1 week or sooner if needed.

## 2024-05-01 NOTE — HISTORY OF PRESENT ILLNESS
[Home] : at home, [unfilled] , at the time of the visit. [Medical Office: ([facility])___] : at the medical office located in  [Verbal consent obtained from patient] : the patient, [unfilled] [FreeTextEntry1] : Pt presents for a follow up on her sinus congestion, blood pressure and night sweats. [de-identified] : The patient telephoned and requested a call back to discuss their health.  The visit was performed over the telephone as the patient was unable to be seen in the office due to the COVID 19 pandemic.  She reports that she still has some right-sided sinus pressure and postnasal drip and would like to stay on both of her antibiotics for now.  She continues to monitor her blood pressure at home and it is generally in the 120/70 range with occasional readings in the 140 range systolic.  She also reports that the night sweats are about the same but there are nights when she does get 5-6 straight hours of sleep.  She was walking outdoors the other day when it was warm and her muscle strength is slowly improving.  Her weight is 108 pounds.

## 2024-05-08 ENCOUNTER — APPOINTMENT (OUTPATIENT)
Dept: FAMILY MEDICINE | Facility: CLINIC | Age: 79
End: 2024-05-08
Payer: MEDICARE

## 2024-05-08 PROCEDURE — 99442: CPT

## 2024-05-08 NOTE — PLAN
[FreeTextEntry1] : She was advised to take prednisone as directed and she will continue taking both antibiotics. She will continue all other medications without change and will follow up telephonically in 1 week or sooner prn.

## 2024-05-08 NOTE — HISTORY OF PRESENT ILLNESS
[Home] : at home, [unfilled] , at the time of the visit. [Medical Office: ([facility])___] : at the medical office located in  [Verbal consent obtained from patient] : the patient, [unfilled] [FreeTextEntry1] : Pt presents for a follow up on her blood pressure, night sweats and sinus infection. [de-identified] : The patient telephoned and requested a call back to discuss their health.  The visit was performed over the telephone as the patient was unable to be seen in the office due to the COVID 19 pandemic.  She ran a lot of errands last Saturday and the following day she started to have body aches and feels more fatigued.  She would like to go on prednisone again.  The night sweats come and go.  Her blood pressure has been in the 110-120/60-70 range but yesterday it was high and she took a lisinopril then it came down in the afternoon.  Her weight is 108 pounds.

## 2024-05-15 ENCOUNTER — APPOINTMENT (OUTPATIENT)
Dept: FAMILY MEDICINE | Facility: CLINIC | Age: 79
End: 2024-05-15
Payer: MEDICARE

## 2024-05-15 PROCEDURE — 99442: CPT

## 2024-05-15 NOTE — HISTORY OF PRESENT ILLNESS
[Home] : at home, [unfilled] , at the time of the visit. [Medical Office: ([facility])___] : at the medical office located in  [Verbal consent obtained from patient] : the patient, [unfilled] [de-identified] : The patient telephoned and requested a call back to discuss their health.  The visit was performed over the telephone as the patient was unable to be seen in the office due to the COVID 19 pandemic.  She was feeling somewhat better regarding the weakness and sinus headaches but now she feels a little worse.  She is taking the prednisone.  She still has the night sweats on and off.  Most of her blood pressure readings are in the 120/70 range but she occasionally has a high reading which comes down with the lisinopril. [FreeTextEntry1] : Pt presents for a follow up on the sinus infection, night sweats and anxiety.

## 2024-05-15 NOTE — PLAN
[FreeTextEntry1] : She was advised that the blood pressure elevation and worsening night sweats may be due to the prednisone.  I checked I-STOP and renewed diazepam and will continue all medications and she will follow up telephonically in 1 week or sooner prn.  She will continue to monitor her blood pressure at home.

## 2024-05-22 ENCOUNTER — APPOINTMENT (OUTPATIENT)
Dept: FAMILY MEDICINE | Facility: CLINIC | Age: 79
End: 2024-05-22
Payer: MEDICARE

## 2024-05-22 DIAGNOSIS — F41.1 GENERALIZED ANXIETY DISORDER: ICD-10-CM

## 2024-05-22 PROCEDURE — 99442: CPT

## 2024-05-22 NOTE — HISTORY OF PRESENT ILLNESS
[Home] : at home, [unfilled] , at the time of the visit. [Medical Office: ([facility])___] : at the medical office located in  [Verbal consent obtained from patient] : the patient, [unfilled] [FreeTextEntry1] : Pt presents for a follow up on her headaches, sinus congestion, weakness and high blood pressure. [de-identified] : The patient telephoned and requested a call back to discuss their health.  The visit was performed over the telephone as the patient was unable to be seen in the office due to the COVID 19 pandemic.  She reports that she is feeling less tired and less achy.  She is sleeping 5-7 hours per night depending on the sweating.  Her BP has been in the 140 range a few times.  Her weight is 109.

## 2024-05-22 NOTE — PLAN
[FreeTextEntry1] : For the stable hypertension she will continue taking metoprolol ER 25 mg daily and lisinopril 2.5 mg for SBP of 140 or higher. For the stable chronic sinusitis she will continue taking Bactrim and Cefuroxime.  She will continue the mirtazapine and diazepam as per usual dosing.  She will follow up telephonically in 1 week or sooner prn.

## 2024-05-29 ENCOUNTER — APPOINTMENT (OUTPATIENT)
Dept: FAMILY MEDICINE | Facility: CLINIC | Age: 79
End: 2024-05-29
Payer: MEDICARE

## 2024-05-29 PROCEDURE — 99442: CPT

## 2024-05-29 NOTE — PLAN
[FreeTextEntry1] : For the stable chronic sinusitis she will continue taking Bactrim DS bid and Cefuroxime 500 mg bid.  She will continue use of fluticasone and azelastine nasal sprays. She will continue taking metoprolol ER 25 mg daily and the extra lisinopril 2.5 mg daily if her SBP is 140 or higher. For the stable anxiety and depression she will continue taking diazepam 5 mg bid prn and mirtazapine 7.5 mg daily.  She will follow up telephonically in 1 week or sooner prn.

## 2024-05-29 NOTE — HISTORY OF PRESENT ILLNESS
[Home] : at home, [unfilled] , at the time of the visit. [Medical Office: ([facility])___] : at the medical office located in  [Verbal consent obtained from patient] : the patient, [unfilled] [FreeTextEntry1] : Pt presents for a follow up on her blood pressure, sinus symptoms, sleep and overall health. [de-identified] : The patient telephoned and requested a call back to discuss their health.  The visit was performed over the telephone as the patient was unable to be seen in the office due to the COVID 19 pandemic.  She reports feeling some improvement in the sinus pain and pressure but would like to continue taking her antibiotics for a while longer.  Her weight is 109.5.  She is sleeping a little better and eating well.  Her BP is in the 110-130/60-80 range.

## 2024-06-05 ENCOUNTER — APPOINTMENT (OUTPATIENT)
Dept: FAMILY MEDICINE | Facility: CLINIC | Age: 79
End: 2024-06-05
Payer: MEDICARE

## 2024-06-05 PROCEDURE — 99442: CPT

## 2024-06-05 NOTE — PLAN
[FreeTextEntry1] : She was advised to continue to monitor her blood pressure daily and to continue to take the metoprolol ER 25 mg daily and the lisinopril 2.5 mg if the SBP is 140 or higher.  For the chronic sinusitis she will continue taking Bactrim and Cefuroxime and using the nasal sprays. She will follow up telephonically in 1 week or sooner prn.

## 2024-06-05 NOTE — HISTORY OF PRESENT ILLNESS
[Home] : at home, [unfilled] , at the time of the visit. [Medical Office: ([facility])___] : at the medical office located in  [Verbal consent obtained from patient] : the patient, [unfilled] [FreeTextEntry1] : Pt presents for a follow up on her sinus infection and blood pressure. [de-identified] : The patient telephoned and requested a call back to discuss their health.  The visit was performed over the telephone as the patient was unable to be seen in the office due to the COVID 19 pandemic.  She was achy over the weekend but that resolved. She still has sinus pain and pressure on and off.  Her BP has been in the 120-high 130/60-70 range.  Her right foot and ankle were hurting last night but that stopped.  Her weight is fluctuating from 110-111 pounds.  She still has the sweats on and off.  She feels better emotionally.  Her bowel movements are normal.

## 2024-06-11 ENCOUNTER — LABORATORY RESULT (OUTPATIENT)
Age: 79
End: 2024-06-11

## 2024-06-12 ENCOUNTER — APPOINTMENT (OUTPATIENT)
Dept: FAMILY MEDICINE | Facility: CLINIC | Age: 79
End: 2024-06-12
Payer: MEDICARE

## 2024-06-12 DIAGNOSIS — R79.89 OTHER SPECIFIED ABNORMAL FINDINGS OF BLOOD CHEMISTRY: ICD-10-CM

## 2024-06-12 DIAGNOSIS — E03.9 HYPOTHYROIDISM, UNSPECIFIED: ICD-10-CM

## 2024-06-12 DIAGNOSIS — R68.89 OTHER GENERAL SYMPTOMS AND SIGNS: ICD-10-CM

## 2024-06-12 DIAGNOSIS — D64.9 ANEMIA, UNSPECIFIED: ICD-10-CM

## 2024-06-12 DIAGNOSIS — I10 ESSENTIAL (PRIMARY) HYPERTENSION: ICD-10-CM

## 2024-06-12 DIAGNOSIS — E78.2 MIXED HYPERLIPIDEMIA: ICD-10-CM

## 2024-06-12 PROCEDURE — 99442: CPT

## 2024-06-12 NOTE — PLAN
[FreeTextEntry1] : Follow-up blood work results were reviewed with her and she was advised to start taking B12 500 mcg once daily.  She will continue to take all of her usual prescription medications.  She was also advised to take prednisone for the flareup of the chronic sinusitis.  She was extremely anxious while we are on the phone and she was given significant emotional support today.  She will be contacted with the COVID swab results and she will follow-up telephonically in 6 days or sooner if needed.

## 2024-06-12 NOTE — HISTORY OF PRESENT ILLNESS
[Home] : at home, [unfilled] , at the time of the visit. [Medical Office: ([facility])___] : at the medical office located in  [Verbal consent obtained from patient] : the patient, [unfilled] [FreeTextEntry1] : Pt presents for weakness, sinus congestion and fatigue. [de-identified] : The patient telephoned and requested a call back to discuss their health.  The visit was performed over the telephone as the patient was unable to be seen in the office due to the COVID 19 pandemic.  She reports that she has been experiencing overall feelings of muscle weakness, muscle pain and flulike symptoms.  She continues to have the sinus congestion with a little bit of worsening pain and pressure behind the right eye.  She has been sleeping a little bit better and is eating well and her weight is 110 pounds.  Her blood pressure has been fluctuating between 120-140 for the systolic readings and in the 60-70 readings for diastolic.  Her pulse rate is between 60 and 80.

## 2024-06-12 NOTE — ADDENDUM
[FreeTextEntry1] : After we finished the telephonic visit I received the Covid swab result, negative, and it was reviewed with her.

## 2024-06-18 ENCOUNTER — APPOINTMENT (OUTPATIENT)
Dept: FAMILY MEDICINE | Facility: CLINIC | Age: 79
End: 2024-06-18
Payer: MEDICARE

## 2024-06-18 DIAGNOSIS — Z87.09 PERSONAL HISTORY OF OTHER DISEASES OF THE RESPIRATORY SYSTEM: ICD-10-CM

## 2024-06-18 DIAGNOSIS — J01.90 ACUTE SINUSITIS, UNSPECIFIED: ICD-10-CM

## 2024-06-18 DIAGNOSIS — J06.9 ACUTE UPPER RESPIRATORY INFECTION, UNSPECIFIED: ICD-10-CM

## 2024-06-18 DIAGNOSIS — G47.00 INSOMNIA, UNSPECIFIED: ICD-10-CM

## 2024-06-18 PROCEDURE — 99442: CPT

## 2024-06-18 RX ORDER — SULFAMETHOXAZOLE AND TRIMETHOPRIM 800; 160 MG/1; MG/1
800-160 TABLET ORAL
Qty: 28 | Refills: 1 | Status: ACTIVE | COMMUNITY
Start: 1900-01-01 | End: 1900-01-01

## 2024-06-18 RX ORDER — PREDNISONE 10 MG/1
10 TABLET ORAL
Qty: 20 | Refills: 0 | Status: DISCONTINUED | COMMUNITY
Start: 2021-09-13 | End: 2024-06-18

## 2024-06-18 RX ORDER — DIAZEPAM 5 MG/1
5 TABLET ORAL TWICE DAILY
Qty: 60 | Refills: 0 | Status: ACTIVE | COMMUNITY
Start: 2018-12-13 | End: 1900-01-01

## 2024-06-18 NOTE — PLAN
[FreeTextEntry1] : She will finish the course of prednisone and will continue taking Bactrim and Cefuroxime. She will continue to monitor her blood pressure and will follow up telephonically in 1 week or sooner prn.

## 2024-06-18 NOTE — HISTORY OF PRESENT ILLNESS
[Home] : at home, [unfilled] , at the time of the visit. [Medical Office: ([facility])___] : at the medical office located in  [Verbal consent obtained from patient] : the patient, [unfilled] [FreeTextEntry1] : Pt presents for a follow up on her weakness and sinus pain and pressure and blood pressure. [de-identified] : The patient telephoned and requested a call back to discuss their health.  The visit was performed over the telephone as the patient was unable to be seen in the office due to the COVID 19 pandemic.  She started taking the prednisone on 6/15.  She is feeling less achy and has less sinus pain and pressure and is less fatigued. She checked her BP this morning and it was 142/75 and she took a lisinopril.  She would like to renew diazepam today.  Her last weight was 111 pounds.

## 2024-06-24 ENCOUNTER — APPOINTMENT (OUTPATIENT)
Dept: FAMILY MEDICINE | Facility: CLINIC | Age: 79
End: 2024-06-24
Payer: MEDICARE

## 2024-06-24 DIAGNOSIS — I10 ESSENTIAL (PRIMARY) HYPERTENSION: ICD-10-CM

## 2024-06-24 PROCEDURE — 99442: CPT

## 2024-06-26 ENCOUNTER — APPOINTMENT (OUTPATIENT)
Dept: FAMILY MEDICINE | Facility: CLINIC | Age: 79
End: 2024-06-26
Payer: MEDICARE

## 2024-06-26 DIAGNOSIS — R61 GENERALIZED HYPERHIDROSIS: ICD-10-CM

## 2024-06-26 DIAGNOSIS — F32.A ANXIETY DISORDER, UNSPECIFIED: ICD-10-CM

## 2024-06-26 DIAGNOSIS — F41.9 ANXIETY DISORDER, UNSPECIFIED: ICD-10-CM

## 2024-06-26 DIAGNOSIS — M79.10 MYALGIA, UNSPECIFIED SITE: ICD-10-CM

## 2024-06-26 DIAGNOSIS — J32.9 CHRONIC SINUSITIS, UNSPECIFIED: ICD-10-CM

## 2024-06-26 PROCEDURE — 99442: CPT

## 2024-06-26 RX ORDER — PREDNISONE 10 MG/1
10 TABLET ORAL
Qty: 18 | Refills: 0 | Status: DISCONTINUED | COMMUNITY
Start: 2023-05-02 | End: 2024-06-26

## 2024-06-26 RX ORDER — CEFUROXIME AXETIL 500 MG/1
500 TABLET ORAL
Qty: 28 | Refills: 1 | Status: ACTIVE | COMMUNITY
Start: 2023-09-20 | End: 1900-01-01

## 2024-07-03 ENCOUNTER — APPOINTMENT (OUTPATIENT)
Dept: FAMILY MEDICINE | Facility: CLINIC | Age: 79
End: 2024-07-03
Payer: MEDICARE

## 2024-07-03 DIAGNOSIS — R68.89 OTHER GENERAL SYMPTOMS AND SIGNS: ICD-10-CM

## 2024-07-03 PROCEDURE — 99442: CPT

## 2024-07-10 ENCOUNTER — APPOINTMENT (OUTPATIENT)
Dept: FAMILY MEDICINE | Facility: CLINIC | Age: 79
End: 2024-07-10
Payer: MEDICARE

## 2024-07-10 DIAGNOSIS — F41.1 GENERALIZED ANXIETY DISORDER: ICD-10-CM

## 2024-07-10 PROCEDURE — 99442: CPT

## 2024-07-17 ENCOUNTER — APPOINTMENT (OUTPATIENT)
Dept: FAMILY MEDICINE | Facility: CLINIC | Age: 79
End: 2024-07-17
Payer: MEDICARE

## 2024-07-17 DIAGNOSIS — J01.90 ACUTE SINUSITIS, UNSPECIFIED: ICD-10-CM

## 2024-07-17 DIAGNOSIS — F32.A ANXIETY DISORDER, UNSPECIFIED: ICD-10-CM

## 2024-07-17 DIAGNOSIS — F41.9 ANXIETY DISORDER, UNSPECIFIED: ICD-10-CM

## 2024-07-17 PROCEDURE — 99442: CPT

## 2024-07-17 RX ORDER — MIRTAZAPINE 7.5 MG/1
7.5 TABLET, FILM COATED ORAL
Refills: 0 | Status: ACTIVE | COMMUNITY

## 2024-07-24 ENCOUNTER — APPOINTMENT (OUTPATIENT)
Dept: FAMILY MEDICINE | Facility: CLINIC | Age: 79
End: 2024-07-24
Payer: MEDICARE

## 2024-07-24 DIAGNOSIS — F41.1 GENERALIZED ANXIETY DISORDER: ICD-10-CM

## 2024-07-24 PROCEDURE — 99441: CPT

## 2024-07-24 NOTE — HISTORY OF PRESENT ILLNESS
[Home] : at home, [unfilled] , at the time of the visit. [Medical Office: ([facility])___] : at the medical office located in  [Verbal consent obtained from patient] : the patient, [unfilled] [FreeTextEntry1] : Pt presents for a follow up for her blood pressure, sinus congestion, fatigue and night sweats. [de-identified] : The patient telephoned and requested a call back to discuss their health.  The visit was performed over the telephone as the patient was unable to be seen in the office due to the COVID 19 pandemic. She reports that she had a very good day 2 days ago but yesterday and today she feels more tired.  She is sleeping better.  The night sweats are a little better.  She is having regular bowel movements.  Her weight is 107.  She is having less intestinal gas.  Her blood pressure is in the 120-140/70 range.

## 2024-07-24 NOTE — PLAN
[FreeTextEntry1] : I-STOP was checked and I renewed diazepam.  She will continue all medications without change and she was advised that she is slowly improving.  She will continue monitoring her weight and blood pressure and will follow up telephonically in 1 week or sooner prn.

## 2024-07-31 ENCOUNTER — APPOINTMENT (OUTPATIENT)
Dept: FAMILY MEDICINE | Facility: CLINIC | Age: 79
End: 2024-07-31
Payer: MEDICARE

## 2024-07-31 VITALS — WEIGHT: 108 LBS | BODY MASS INDEX: 17.97 KG/M2

## 2024-07-31 DIAGNOSIS — J32.9 CHRONIC SINUSITIS, UNSPECIFIED: ICD-10-CM

## 2024-07-31 DIAGNOSIS — K59.09 OTHER CONSTIPATION: ICD-10-CM

## 2024-07-31 PROCEDURE — 99442: CPT

## 2024-07-31 NOTE — HISTORY OF PRESENT ILLNESS
[Home] : at home, [unfilled] , at the time of the visit. [Medical Office: ([facility])___] : at the medical office located in  [Verbal consent obtained from patient] : the patient, [unfilled] [FreeTextEntry1] : Pt presents for a follow up on her sinus infection, blood pressure and night sweats. [de-identified] : The patient telephoned and requested a call back to discuss their health.  The visit was performed over the telephone as the patient was unable to be seen in the office due to the COVID 19 pandemic.  She reports that she started having pain in her bladder area and she started herself on Cefuroxime and it is helping. She plans to take it for 1 week. She feels stronger and is only having some headaches when awakening in the morning.  She has been able to be more physically active.  The night sweats are a little better and her blood pressure has been in the 120/60-70 range and she gained 1 pound and is eating better.

## 2024-07-31 NOTE — PLAN
[FreeTextEntry1] : She was advised to continue taking Cefuroxime for a total of 7 days for the bladder pain and to continue taking Bactrim for the chronic sinusitis. For the improved depression and anxiety she will continue taking mirtazapine 7.5 mg every other day alternating with 15 mg every other day and diazepam 5 mg bid prn. For the stable hypertension she will continue taking metoprolol ER 25 mg daily and lisinopril 2.5 mg if the SBP is 140 or higher.  For the stable hypothyroidism she will continue taking levothyroxine 25 mcg daily and for the stable hyperlipidemia she will continue taking pravastatin 10 mg nightly. She will follow up telephonically in 1 week or sooner prn.

## 2024-08-07 ENCOUNTER — APPOINTMENT (OUTPATIENT)
Dept: FAMILY MEDICINE | Facility: CLINIC | Age: 79
End: 2024-08-07

## 2024-08-07 PROBLEM — R68.89 FLU-LIKE SYMPTOMS: Status: RESOLVED | Noted: 2023-03-02 | Resolved: 2024-08-07

## 2024-08-07 PROBLEM — R05.9 COUGH IN ADULT: Status: RESOLVED | Noted: 2023-03-22 | Resolved: 2024-08-07

## 2024-08-07 PROCEDURE — 99441: CPT

## 2024-08-07 NOTE — HISTORY OF PRESENT ILLNESS
[Home] : at home, [unfilled] , at the time of the visit. [Medical Office: ([facility])___] : at the medical office located in  [Verbal consent obtained from patient] : the patient, [unfilled] [FreeTextEntry1] : Pt presents for a follow up for her high blood pressure, sinus symptoms and night sweats. [de-identified] : The patient telephoned and requested a call back to discuss their health.  The visit was performed over the telephone as the patient was unable to be seen in the office due to the COVID 19 pandemic.  She reports that her weight is 110 pounds.  She has been eating better.  She has less sinus pressure but still feels fatigued so far.  She still gets the night sweats but they aren't as severe.  Her BP readings are in the 120/60 range.

## 2024-08-07 NOTE — PLAN
[FreeTextEntry1] : She was advised to continue taking her current medications and to continue monitoring her weight and blood pressure regularly at home.

## 2024-08-14 ENCOUNTER — APPOINTMENT (OUTPATIENT)
Dept: FAMILY MEDICINE | Facility: CLINIC | Age: 79
End: 2024-08-14
Payer: MEDICARE

## 2024-08-14 DIAGNOSIS — J30.9 ALLERGIC RHINITIS, UNSPECIFIED: ICD-10-CM

## 2024-08-14 PROCEDURE — 99442: CPT

## 2024-08-14 NOTE — PLAN
[FreeTextEntry1] : She was advised to continue to monitor her blood pressure at home and to continue taking metoprolol ER 25 mg daily and lisinopril 2.5 mg if the SBP is 140 or higher. She will continue taking mirtazapine 15 mg every other night alternating with 7.5 mg every other night and diazepam 5 mg bid prn for the improved anxiety.  She will continue taking Bactrim and using her nasal sprays for the chronic sinusitis and she will follow up telephonically in 1 week or sooner prn.

## 2024-08-14 NOTE — HISTORY OF PRESENT ILLNESS
[Home] : at home, [unfilled] , at the time of the visit. [Medical Office: ([facility])___] : at the medical office located in  [Verbal consent obtained from patient] : the patient, [unfilled] [FreeTextEntry1] : Pt presents for a follow up for her high blood pressure, night sweats and sinus symptoms. [de-identified] : The patient telephoned and requested a call back to discuss their health.  The visit was performed over the telephone as the patient was unable to be seen in the office due to the COVID 19 pandemic.  She reports that her blood pressure has been in the 110-120/70 range and her heart rate is in the 60-70's.  She has been eating better and her weight is 110 pounds.  She still has some sinus pressure and would like to renew Bactrim.  She is sleeping better.

## 2024-08-21 ENCOUNTER — APPOINTMENT (OUTPATIENT)
Dept: FAMILY MEDICINE | Facility: CLINIC | Age: 79
End: 2024-08-21
Payer: MEDICARE

## 2024-08-21 VITALS — BODY MASS INDEX: 18.14 KG/M2 | WEIGHT: 109 LBS

## 2024-08-21 PROCEDURE — 99442: CPT

## 2024-08-21 RX ORDER — PREDNISONE 10 MG/1
10 TABLET ORAL
Qty: 20 | Refills: 0 | Status: ACTIVE | COMMUNITY
Start: 2024-08-21 | End: 1900-01-01

## 2024-08-21 NOTE — PLAN
[FreeTextEntry1] : She was advised to take prednisone, 40 mg daily for 2 days then 30 mg daily for 2 days then 20 mg daily for 2 days then 10 mg daily then stop.  She will continue taking the Bactrim as she has been for the chronic sinusitis.  She will continue taking metoprolol ER 25 mg once daily for the hypertension and will add lisinopril 2.5 mg if the systolic blood pressure is 140 or higher.  She will continue alternating 7.5 mg of mirtazapine every other night with 15 mg of mirtazapine every other night and she will continue taking the diazepam 5 mg twice a day as needed.  She will follow-up telephonically in 1 week or sooner if needed.

## 2024-08-21 NOTE — HISTORY OF PRESENT ILLNESS
[Home] : at home, [unfilled] , at the time of the visit. [Medical Office: ([facility])___] : at the medical office located in  [Verbal consent obtained from patient] : the patient, [unfilled] [FreeTextEntry1] : Pt presents for a follow up on her high blood pressure and sinus symptoms. [de-identified] : The patient telephoned and requested a call back to discuss their health.  The visit was performed over the telephone as the patient was unable to be seen in the office due to the COVID 19 pandemic.  She reports that since last Thursday she has had intermittent nausea, loose stools, body aches and flulike symptoms.  She is slowly improving but would like a prescription for prednisone to help with her healing.  She is sleeping for 3 to 5 hours at a time which she has been improving.  Her most recent weight was 109 pounds.  Her blood pressure was very high last week, up to 167 for the systolic reading, but it is now back into the 110/70 range.

## 2024-08-28 ENCOUNTER — APPOINTMENT (OUTPATIENT)
Dept: FAMILY MEDICINE | Facility: CLINIC | Age: 79
End: 2024-08-28
Payer: MEDICARE

## 2024-08-28 DIAGNOSIS — J32.9 CHRONIC SINUSITIS, UNSPECIFIED: ICD-10-CM

## 2024-08-28 DIAGNOSIS — R61 GENERALIZED HYPERHIDROSIS: ICD-10-CM

## 2024-08-28 DIAGNOSIS — K59.09 OTHER CONSTIPATION: ICD-10-CM

## 2024-08-28 PROCEDURE — 99442: CPT

## 2024-08-28 NOTE — PLAN
[FreeTextEntry1] : I renewed the Bactrim for the chronic sinusitis.  She will continue taking Miralax for the chronic constipation.  She will continue to monitor her blood pressure at home and will take the metoprolol ER 25 mg daily and the lisinopril 2.5 mg if the SBP is 140 or higher.  She will finish the prednisone and will follow up telephonically in 1 week or sooner prn.

## 2024-08-28 NOTE — HISTORY OF PRESENT ILLNESS
[Home] : at home, [unfilled] , at the time of the visit. [Other Location: e.g. Home (Enter Location, City,State)___] : at [unfilled] [Verbal consent obtained from patient] : the patient, [unfilled] [FreeTextEntry1] : Pt presents for a follow up on her blood pressure, night sweats and sinus infection. [de-identified] : The patient telephoned and requested a call back to discuss their health.  The visit was performed over the telephone as the patient was unable to be seen in the office due to the COVID 19 pandemic.  She reports that the muscle aches and sinus pain are about 75% improved and she is tolerating the prednisone.  The night sweats are a little worse.  Her blood pressure has been in the 110-140/60-70 range.  She has been a little constipated since she was off of the miralax due to having loose stools but the constipation has improved since she resumed taking it.  She would like to stay on Bactrim.

## 2024-09-04 ENCOUNTER — APPOINTMENT (OUTPATIENT)
Dept: FAMILY MEDICINE | Facility: CLINIC | Age: 79
End: 2024-09-04
Payer: MEDICARE

## 2024-09-04 DIAGNOSIS — E53.8 DEFICIENCY OF OTHER SPECIFIED B GROUP VITAMINS: ICD-10-CM

## 2024-09-04 DIAGNOSIS — G47.00 INSOMNIA, UNSPECIFIED: ICD-10-CM

## 2024-09-04 PROCEDURE — 99442: CPT

## 2024-09-04 NOTE — HISTORY OF PRESENT ILLNESS
[Home] : at home, [unfilled] , at the time of the visit. [Medical Office: ([facility])___] : at the medical office located in  [Verbal consent obtained from patient] : the patient, [unfilled] [FreeTextEntry1] : Pt presents for a follow up for her blood pressure, fatigue and sinus infection. [de-identified] : The patient telephoned and requested a call back to discuss their health.  The visit was performed over the telephone as the patient was unable to be seen in the office due to the COVID 19 pandemic.  She reports that her blood pressure has been in the 116-141/60-80.  Her weight is 110 pounds and she has had a good appetite.  She still has some sinus congestion and pressure and wishes to continue taking the Bactrim.  She would like to renew diazepam which is working well for the anxiety.

## 2024-09-04 NOTE — PLAN
[FreeTextEntry1] : I checked I-STOP and renewed diazepam.  She will continue taking the Bactrim.  She will continue monitoring her BP at home and will continue taking the metoprolol ER 25 mg daily and lisinopril 2.5 mg if the SBP is 140 or higher.  She will continue taking mirtazapine 7.5 mg every other day alternating with 15 mg every other day and will follow up telephonically in 1 week or sooner prn.

## 2024-09-11 ENCOUNTER — APPOINTMENT (OUTPATIENT)
Dept: FAMILY MEDICINE | Facility: CLINIC | Age: 79
End: 2024-09-11
Payer: MEDICARE

## 2024-09-11 PROCEDURE — 99442: CPT

## 2024-09-11 RX ORDER — CEFUROXIME AXETIL 500 MG/1
500 TABLET ORAL
Qty: 28 | Refills: 1 | Status: ACTIVE | COMMUNITY
Start: 2024-09-11 | End: 1900-01-01

## 2024-09-11 RX ORDER — CEFUROXIME AXETIL 500 MG/1
500 TABLET ORAL TWICE DAILY
Refills: 0 | Status: ACTIVE | COMMUNITY

## 2024-09-11 NOTE — PLAN
[FreeTextEntry1] : She would like to continue taking Bactrim and Cefuroxime for the ongoing sinus infection.  She was advised to continue checking her blood pressures at home and to continue taking the metoprolol ER 25 mg daily and the lisinopril 2.5 mg if the SBP is 140 or higher.  She will continue taking mirtazapine 7.5 mg every other night alternating with 15 mg every other night.  She will follow up telephonically in 1 week or sooner prn.

## 2024-09-11 NOTE — HISTORY OF PRESENT ILLNESS
[Home] : at home, [unfilled] , at the time of the visit. [Medical Office: ([facility])___] : at the medical office located in  [Verbal consent obtained from patient] : the patient, [unfilled] [FreeTextEntry1] : Pt presents for a follow up on her blood pressure, sinus congestion and night sweats. [de-identified] : The patient telephoned and requested a call back to discuss their health.  The visit was performed over the telephone as the patient was unable to be seen in the office due to the COVID 19 pandemic.  Since last week her feet have been swollen toward the end of the day.  She elevates them and it helps.  She is drinking a lot of water but has been eating salty food lately.  She reports that the night sweats are better and she is sleeping better.  The sinus congestion is slowly improving.  Her BP has been in the 120-130/69-70 range.  Her weight is 110.

## 2024-09-18 ENCOUNTER — APPOINTMENT (OUTPATIENT)
Dept: FAMILY MEDICINE | Facility: CLINIC | Age: 79
End: 2024-09-18
Payer: MEDICARE

## 2024-09-18 DIAGNOSIS — F32.A ANXIETY DISORDER, UNSPECIFIED: ICD-10-CM

## 2024-09-18 DIAGNOSIS — F41.9 ANXIETY DISORDER, UNSPECIFIED: ICD-10-CM

## 2024-09-18 PROCEDURE — 99442: CPT

## 2024-09-18 NOTE — PLAN
[FreeTextEntry1] : She was advised to continue taking the Bactrim and Cefuroxime for the sinusitis.  She will continue taking mirtazapine 7.5, mg every other night alternating with 15 mg every other night for the anxiety.  She will continue taking diazepam 5 mg bid prn.  She will continue to monitor her blood pressure at home and will continue taking metoprolol ER 25 mg daily and lisinopril 2.5 mg daily if the SBP is 140 or higher. She will follow up telephonically in 1 week or sooner prn.

## 2024-09-18 NOTE — HISTORY OF PRESENT ILLNESS
[Home] : at home, [unfilled] , at the time of the visit. [Medical Office: ([facility])___] : at the medical office located in  [Verbal consent obtained from patient] : the patient, [unfilled] [FreeTextEntry1] : Pt presents for a follow up on her sinus infection, blood pressure and anxiety. [de-identified] : The patient telephoned and requested a call back to discuss their health.  The visit was performed over the telephone as the patient was unable to be seen in the office due to the COVID 19 pandemic.  She is still a little achy today due to upcoming change in weather.  She still has some sinus pain on and off.  She is sleeping for longer periods of time.  The foot swelling has improved.  Her BP is in the 120-130/60-70 range.  Her weight is 110.

## 2024-09-23 DIAGNOSIS — Z20.822 OTHER SPECIFIED COUGH: ICD-10-CM

## 2024-09-23 DIAGNOSIS — R05.8 OTHER SPECIFIED COUGH: ICD-10-CM

## 2024-09-23 PROBLEM — J02.9 ACUTE PHARYNGITIS, UNSPECIFIED ETIOLOGY: Status: ACTIVE | Noted: 2023-10-12

## 2024-09-23 PROBLEM — J06.9 ACUTE UPPER RESPIRATORY INFECTION: Status: ACTIVE | Noted: 2022-10-08

## 2024-09-24 ENCOUNTER — LABORATORY RESULT (OUTPATIENT)
Age: 79
End: 2024-09-24

## 2024-09-25 ENCOUNTER — RX CHANGE (OUTPATIENT)
Age: 79
End: 2024-09-25

## 2024-09-25 ENCOUNTER — APPOINTMENT (OUTPATIENT)
Dept: FAMILY MEDICINE | Facility: CLINIC | Age: 79
End: 2024-09-25
Payer: MEDICARE

## 2024-09-25 DIAGNOSIS — F41.1 GENERALIZED ANXIETY DISORDER: ICD-10-CM

## 2024-09-25 DIAGNOSIS — G47.00 INSOMNIA, UNSPECIFIED: ICD-10-CM

## 2024-09-25 PROCEDURE — 99442: CPT

## 2024-09-25 RX ORDER — PREDNISONE 10 MG/1
10 TABLET ORAL
Qty: 20 | Refills: 0 | Status: ACTIVE | COMMUNITY
Start: 2024-09-25 | End: 1900-01-01

## 2024-09-25 NOTE — HISTORY OF PRESENT ILLNESS
[Home] : at home, [unfilled] , at the time of the visit. [Medical Office: ([facility])___] : at the medical office located in  [Verbal consent obtained from patient] : the patient, [unfilled] [FreeTextEntry1] : Pt presents for a follow up on her sinus infection, fatigue, headaches and night sweats and blood pressure. [de-identified] : The patient telephoned and requested a call back to discuss their health.  The visit was performed over the telephone as the patient was unable to be seen in the office due to the COVID 19 pandemic.  Her BP was persistently elevated on Sunday, and she took an extra metoprolol and then lisinopril twice. Her BP is better today.  She had the Covid swab yesterday.  Yesterday she felt a little better than she does today.  She had a little swelling of the left foot the other day, but it resolved.

## 2024-09-25 NOTE — PLAN
[FreeTextEntry1] : She was advised to take prednisone 40 mg daily for 2 days then 30 mg daily for 2 days then 20 mg daily for 2 days then 10 mg daily for 2 days and to continue taking Cefuroxime and Bactrim. She will be contacted with her Covid results and she will continue monitoring her BP regularly and will follow up telephonically in 1 week or sooner prn.

## 2024-09-26 ENCOUNTER — NON-APPOINTMENT (OUTPATIENT)
Age: 79
End: 2024-09-26

## 2024-10-02 ENCOUNTER — APPOINTMENT (OUTPATIENT)
Dept: FAMILY MEDICINE | Facility: CLINIC | Age: 79
End: 2024-10-02
Payer: MEDICARE

## 2024-10-02 DIAGNOSIS — Z87.09 PERSONAL HISTORY OF OTHER DISEASES OF THE RESPIRATORY SYSTEM: ICD-10-CM

## 2024-10-02 DIAGNOSIS — J06.9 ACUTE UPPER RESPIRATORY INFECTION, UNSPECIFIED: ICD-10-CM

## 2024-10-02 PROCEDURE — 99442: CPT

## 2024-10-02 NOTE — PLAN
[FreeTextEntry1] : She was advised to continue to monitor her blood pressure at home and will continue current medications and will follow up with me in 1 week telephonically or sooner prn.

## 2024-10-02 NOTE — HISTORY OF PRESENT ILLNESS
[Home] : at home, [unfilled] , at the time of the visit. [Medical Office: ([facility])___] : at the medical office located in  [Verbal consent obtained from patient] : the patient, [unfilled] [FreeTextEntry1] : Pt presents for a follow up on her blood pressure and sinus infection. [de-identified] : The patient telephoned and requested a call back to discuss their health.  The visit was performed over the telephone as the patient was unable to be seen in the office due to the COVID 19 pandemic.  She is still feeling tired and achy and still has the headaches on and off. Her BP has been running a little high for the past 5 days and she has needed to take lisinopril a few times.  This morning it was normal.  She is tolerating the prednisone and the antibiotics.  She has had some night sweats recently.  Her weight is 109.

## 2024-10-03 ENCOUNTER — RX CHANGE (OUTPATIENT)
Age: 79
End: 2024-10-03

## 2024-10-03 ENCOUNTER — APPOINTMENT (OUTPATIENT)
Dept: FAMILY MEDICINE | Facility: CLINIC | Age: 79
End: 2024-10-03
Payer: MEDICARE

## 2024-10-03 DIAGNOSIS — I10 ESSENTIAL (PRIMARY) HYPERTENSION: ICD-10-CM

## 2024-10-03 PROCEDURE — 99442: CPT

## 2024-10-09 ENCOUNTER — APPOINTMENT (OUTPATIENT)
Dept: FAMILY MEDICINE | Facility: CLINIC | Age: 79
End: 2024-10-09
Payer: MEDICARE

## 2024-10-09 DIAGNOSIS — R53.1 WEAKNESS: ICD-10-CM

## 2024-10-09 DIAGNOSIS — G47.00 INSOMNIA, UNSPECIFIED: ICD-10-CM

## 2024-10-09 PROCEDURE — 99442: CPT

## 2024-10-16 ENCOUNTER — APPOINTMENT (OUTPATIENT)
Dept: FAMILY MEDICINE | Facility: CLINIC | Age: 79
End: 2024-10-16
Payer: MEDICARE

## 2024-10-16 DIAGNOSIS — R61 GENERALIZED HYPERHIDROSIS: ICD-10-CM

## 2024-10-16 DIAGNOSIS — I10 ESSENTIAL (PRIMARY) HYPERTENSION: ICD-10-CM

## 2024-10-16 PROCEDURE — 99442: CPT

## 2024-10-17 ENCOUNTER — RX RENEWAL (OUTPATIENT)
Age: 79
End: 2024-10-17

## 2024-10-23 ENCOUNTER — RX CHANGE (OUTPATIENT)
Age: 79
End: 2024-10-23

## 2024-10-23 ENCOUNTER — APPOINTMENT (OUTPATIENT)
Dept: FAMILY MEDICINE | Facility: CLINIC | Age: 79
End: 2024-10-23
Payer: MEDICARE

## 2024-10-23 DIAGNOSIS — J06.9 ACUTE UPPER RESPIRATORY INFECTION, UNSPECIFIED: ICD-10-CM

## 2024-10-23 DIAGNOSIS — R53.1 WEAKNESS: ICD-10-CM

## 2024-10-23 DIAGNOSIS — E03.9 HYPOTHYROIDISM, UNSPECIFIED: ICD-10-CM

## 2024-10-23 DIAGNOSIS — I10 ESSENTIAL (PRIMARY) HYPERTENSION: ICD-10-CM

## 2024-10-23 PROCEDURE — 99442: CPT

## 2024-10-23 RX ORDER — MIRTAZAPINE 7.5 MG/1
7.5 TABLET, FILM COATED ORAL
Qty: 135 | Refills: 3 | Status: ACTIVE | COMMUNITY
Start: 2024-10-23 | End: 1900-01-01

## 2024-10-30 ENCOUNTER — APPOINTMENT (OUTPATIENT)
Dept: FAMILY MEDICINE | Facility: CLINIC | Age: 79
End: 2024-10-30
Payer: MEDICARE

## 2024-10-30 PROCEDURE — 99442: CPT

## 2024-11-06 ENCOUNTER — APPOINTMENT (OUTPATIENT)
Dept: FAMILY MEDICINE | Facility: CLINIC | Age: 79
End: 2024-11-06
Payer: MEDICARE

## 2024-11-06 VITALS — WEIGHT: 106 LBS | BODY MASS INDEX: 17.64 KG/M2

## 2024-11-06 DIAGNOSIS — F32.A ANXIETY DISORDER, UNSPECIFIED: ICD-10-CM

## 2024-11-06 DIAGNOSIS — F41.9 ANXIETY DISORDER, UNSPECIFIED: ICD-10-CM

## 2024-11-06 PROCEDURE — 99442: CPT

## 2024-11-13 ENCOUNTER — APPOINTMENT (OUTPATIENT)
Dept: FAMILY MEDICINE | Facility: CLINIC | Age: 79
End: 2024-11-13
Payer: MEDICARE

## 2024-11-13 DIAGNOSIS — F41.1 GENERALIZED ANXIETY DISORDER: ICD-10-CM

## 2024-11-13 PROCEDURE — 99442: CPT

## 2024-11-20 ENCOUNTER — APPOINTMENT (OUTPATIENT)
Dept: FAMILY MEDICINE | Facility: CLINIC | Age: 79
End: 2024-11-20
Payer: MEDICARE

## 2024-11-20 PROCEDURE — 99442: CPT

## 2024-11-27 ENCOUNTER — APPOINTMENT (OUTPATIENT)
Dept: FAMILY MEDICINE | Facility: CLINIC | Age: 79
End: 2024-11-27
Payer: MEDICARE

## 2024-11-27 VITALS — WEIGHT: 108 LBS | BODY MASS INDEX: 17.97 KG/M2

## 2024-11-27 DIAGNOSIS — F32.A ANXIETY DISORDER, UNSPECIFIED: ICD-10-CM

## 2024-11-27 DIAGNOSIS — F41.9 ANXIETY DISORDER, UNSPECIFIED: ICD-10-CM

## 2024-11-27 DIAGNOSIS — R61 GENERALIZED HYPERHIDROSIS: ICD-10-CM

## 2024-11-27 PROCEDURE — 99441: CPT

## 2024-12-04 ENCOUNTER — APPOINTMENT (OUTPATIENT)
Dept: FAMILY MEDICINE | Facility: CLINIC | Age: 79
End: 2024-12-04
Payer: MEDICARE

## 2024-12-04 ENCOUNTER — RX RENEWAL (OUTPATIENT)
Age: 79
End: 2024-12-04

## 2024-12-04 VITALS — WEIGHT: 109 LBS | BODY MASS INDEX: 18.14 KG/M2

## 2024-12-04 PROCEDURE — 99442: CPT

## 2024-12-04 RX ORDER — AZELASTINE HYDROCHLORIDE 137 UG/1
137 SPRAY, METERED NASAL
Qty: 30 | Refills: 11 | Status: ACTIVE | COMMUNITY
Start: 2024-12-04 | End: 1900-01-01

## 2024-12-11 ENCOUNTER — APPOINTMENT (OUTPATIENT)
Dept: FAMILY MEDICINE | Facility: CLINIC | Age: 79
End: 2024-12-11
Payer: MEDICARE

## 2024-12-11 DIAGNOSIS — G47.00 INSOMNIA, UNSPECIFIED: ICD-10-CM

## 2024-12-11 PROCEDURE — 99442: CPT

## 2024-12-18 ENCOUNTER — APPOINTMENT (OUTPATIENT)
Dept: FAMILY MEDICINE | Facility: CLINIC | Age: 79
End: 2024-12-18
Payer: MEDICARE

## 2024-12-18 VITALS — WEIGHT: 109 LBS | BODY MASS INDEX: 18.14 KG/M2

## 2024-12-18 DIAGNOSIS — F41.1 GENERALIZED ANXIETY DISORDER: ICD-10-CM

## 2024-12-18 PROCEDURE — 99442: CPT

## 2024-12-27 ENCOUNTER — APPOINTMENT (OUTPATIENT)
Dept: FAMILY MEDICINE | Facility: CLINIC | Age: 79
End: 2024-12-27
Payer: MEDICARE

## 2024-12-27 DIAGNOSIS — F41.9 ANXIETY DISORDER, UNSPECIFIED: ICD-10-CM

## 2024-12-27 DIAGNOSIS — F32.A ANXIETY DISORDER, UNSPECIFIED: ICD-10-CM

## 2024-12-27 DIAGNOSIS — J32.9 CHRONIC SINUSITIS, UNSPECIFIED: ICD-10-CM

## 2024-12-27 DIAGNOSIS — R61 GENERALIZED HYPERHIDROSIS: ICD-10-CM

## 2024-12-27 DIAGNOSIS — I10 ESSENTIAL (PRIMARY) HYPERTENSION: ICD-10-CM

## 2024-12-27 PROCEDURE — 99442: CPT

## 2025-01-15 ENCOUNTER — APPOINTMENT (OUTPATIENT)
Dept: FAMILY MEDICINE | Facility: CLINIC | Age: 80
End: 2025-01-15
Payer: MEDICARE

## 2025-01-15 VITALS
HEART RATE: 61 BPM | WEIGHT: 110 LBS | TEMPERATURE: 98.7 F | RESPIRATION RATE: 16 BRPM | HEIGHT: 65 IN | BODY MASS INDEX: 18.33 KG/M2 | SYSTOLIC BLOOD PRESSURE: 140 MMHG | OXYGEN SATURATION: 97 % | DIASTOLIC BLOOD PRESSURE: 70 MMHG

## 2025-01-15 VITALS — DIASTOLIC BLOOD PRESSURE: 66 MMHG | SYSTOLIC BLOOD PRESSURE: 136 MMHG

## 2025-01-15 DIAGNOSIS — R05.8 OTHER SPECIFIED COUGH: ICD-10-CM

## 2025-01-15 DIAGNOSIS — D64.9 ANEMIA, UNSPECIFIED: ICD-10-CM

## 2025-01-15 DIAGNOSIS — R10.9 UNSPECIFIED ABDOMINAL PAIN: ICD-10-CM

## 2025-01-15 DIAGNOSIS — Z20.822 OTHER SPECIFIED COUGH: ICD-10-CM

## 2025-01-15 DIAGNOSIS — R14.0 ABDOMINAL DISTENSION (GASEOUS): ICD-10-CM

## 2025-01-15 DIAGNOSIS — E78.2 MIXED HYPERLIPIDEMIA: ICD-10-CM

## 2025-01-15 DIAGNOSIS — I10 ESSENTIAL (PRIMARY) HYPERTENSION: ICD-10-CM

## 2025-01-15 DIAGNOSIS — E03.9 HYPOTHYROIDISM, UNSPECIFIED: ICD-10-CM

## 2025-01-15 DIAGNOSIS — G89.29 UNSPECIFIED ABDOMINAL PAIN: ICD-10-CM

## 2025-01-15 DIAGNOSIS — F41.1 GENERALIZED ANXIETY DISORDER: ICD-10-CM

## 2025-01-15 DIAGNOSIS — Z86.79 PERSONAL HISTORY OF OTHER DISEASES OF THE CIRCULATORY SYSTEM: ICD-10-CM

## 2025-01-15 PROCEDURE — G2211 COMPLEX E/M VISIT ADD ON: CPT

## 2025-01-15 PROCEDURE — 36415 COLL VENOUS BLD VENIPUNCTURE: CPT

## 2025-01-15 PROCEDURE — 99214 OFFICE O/P EST MOD 30 MIN: CPT

## 2025-01-16 LAB
ALBUMIN SERPL ELPH-MCNC: 5 G/DL
ALP BLD-CCNC: 101 U/L
ALT SERPL-CCNC: 14 U/L
ANION GAP SERPL CALC-SCNC: 15 MMOL/L
AST SERPL-CCNC: 20 U/L
BILIRUB SERPL-MCNC: 0.2 MG/DL
BUN SERPL-MCNC: 17 MG/DL
CALCIUM SERPL-MCNC: 10.1 MG/DL
CHLORIDE SERPL-SCNC: 100 MMOL/L
CHOLEST SERPL-MCNC: 222 MG/DL
CO2 SERPL-SCNC: 22 MMOL/L
CREAT SERPL-MCNC: 0.76 MG/DL
EGFR: 80 ML/MIN/1.73M2
FERRITIN SERPL-MCNC: 23 NG/ML
GLUCOSE SERPL-MCNC: 128 MG/DL
HCT VFR BLD CALC: 38.2 %
HDLC SERPL-MCNC: 69 MG/DL
HGB BLD-MCNC: 12.3 G/DL
IRON SATN MFR SERPL: 19 %
IRON SERPL-MCNC: 80 UG/DL
LDLC SERPL CALC-MCNC: 140 MG/DL
MCHC RBC-ENTMCNC: 29.6 PG
MCHC RBC-ENTMCNC: 32.2 G/DL
MCV RBC AUTO: 92 FL
NONHDLC SERPL-MCNC: 154 MG/DL
PLATELET # BLD AUTO: 305 K/UL
POTASSIUM SERPL-SCNC: 4.6 MMOL/L
PROT SERPL-MCNC: 7.4 G/DL
RBC # BLD: 4.15 M/UL
RBC # FLD: 13 %
SODIUM SERPL-SCNC: 137 MMOL/L
TIBC SERPL-MCNC: 420 UG/DL
TRIGL SERPL-MCNC: 80 MG/DL
TSH SERPL-ACNC: 4.77 UIU/ML
UIBC SERPL-MCNC: 340 UG/DL
VIT B12 SERPL-MCNC: 627 PG/ML
WBC # FLD AUTO: 8.38 K/UL

## 2025-01-16 RX ORDER — PRAVASTATIN SODIUM 20 MG/1
20 TABLET ORAL
Qty: 90 | Refills: 3 | Status: ACTIVE | COMMUNITY
Start: 2025-01-16 | End: 1900-01-01

## 2025-01-29 ENCOUNTER — APPOINTMENT (OUTPATIENT)
Dept: FAMILY MEDICINE | Facility: CLINIC | Age: 80
End: 2025-01-29

## 2025-01-29 VITALS — BODY MASS INDEX: 17.64 KG/M2 | WEIGHT: 106 LBS

## 2025-01-29 DIAGNOSIS — E03.9 HYPOTHYROIDISM, UNSPECIFIED: ICD-10-CM

## 2025-01-29 DIAGNOSIS — J32.9 CHRONIC SINUSITIS, UNSPECIFIED: ICD-10-CM

## 2025-01-29 DIAGNOSIS — F41.1 GENERALIZED ANXIETY DISORDER: ICD-10-CM

## 2025-01-29 DIAGNOSIS — G47.00 INSOMNIA, UNSPECIFIED: ICD-10-CM

## 2025-01-29 DIAGNOSIS — E78.2 MIXED HYPERLIPIDEMIA: ICD-10-CM

## 2025-01-29 DIAGNOSIS — I10 ESSENTIAL (PRIMARY) HYPERTENSION: ICD-10-CM

## 2025-01-29 PROCEDURE — 99212 OFFICE O/P EST SF 10 MIN: CPT | Mod: 93

## 2025-02-12 ENCOUNTER — APPOINTMENT (OUTPATIENT)
Dept: FAMILY MEDICINE | Facility: CLINIC | Age: 80
End: 2025-02-12

## 2025-02-12 VITALS — BODY MASS INDEX: 17.81 KG/M2 | WEIGHT: 107 LBS

## 2025-02-12 DIAGNOSIS — M79.10 MYALGIA, UNSPECIFIED SITE: ICD-10-CM

## 2025-02-12 PROCEDURE — 99212 OFFICE O/P EST SF 10 MIN: CPT | Mod: 93

## 2025-02-19 ENCOUNTER — APPOINTMENT (OUTPATIENT)
Dept: FAMILY MEDICINE | Facility: CLINIC | Age: 80
End: 2025-02-19

## 2025-02-19 VITALS — BODY MASS INDEX: 17.81 KG/M2 | WEIGHT: 107 LBS

## 2025-02-19 DIAGNOSIS — F41.1 GENERALIZED ANXIETY DISORDER: ICD-10-CM

## 2025-02-19 PROCEDURE — 99212 OFFICE O/P EST SF 10 MIN: CPT | Mod: 93

## 2025-02-20 ENCOUNTER — NON-APPOINTMENT (OUTPATIENT)
Age: 80
End: 2025-02-20

## 2025-02-24 RX ORDER — AMOXICILLIN AND CLAVULANATE POTASSIUM 875; 125 MG/1; MG/1
875-125 TABLET, COATED ORAL
Qty: 28 | Refills: 0 | Status: DISCONTINUED | COMMUNITY
Start: 2025-02-19 | End: 2025-02-24

## 2025-02-26 ENCOUNTER — APPOINTMENT (OUTPATIENT)
Dept: FAMILY MEDICINE | Facility: CLINIC | Age: 80
End: 2025-02-26
Payer: MEDICARE

## 2025-02-26 VITALS — DIASTOLIC BLOOD PRESSURE: 86 MMHG | SYSTOLIC BLOOD PRESSURE: 155 MMHG | HEART RATE: 92 BPM

## 2025-02-26 DIAGNOSIS — F32.A ANXIETY DISORDER, UNSPECIFIED: ICD-10-CM

## 2025-02-26 DIAGNOSIS — R61 GENERALIZED HYPERHIDROSIS: ICD-10-CM

## 2025-02-26 DIAGNOSIS — F41.9 ANXIETY DISORDER, UNSPECIFIED: ICD-10-CM

## 2025-02-26 PROCEDURE — 99212 OFFICE O/P EST SF 10 MIN: CPT | Mod: 93

## 2025-02-26 RX ORDER — SULFAMETHOXAZOLE AND TRIMETHOPRIM 800; 160 MG/1; MG/1
800-160 TABLET ORAL
Qty: 28 | Refills: 1 | Status: ACTIVE | COMMUNITY
Start: 2025-02-24 | End: 1900-01-01

## 2025-03-05 ENCOUNTER — APPOINTMENT (OUTPATIENT)
Dept: FAMILY MEDICINE | Facility: CLINIC | Age: 80
End: 2025-03-05
Payer: MEDICARE

## 2025-03-05 DIAGNOSIS — R53.1 WEAKNESS: ICD-10-CM

## 2025-03-05 PROCEDURE — 99212 OFFICE O/P EST SF 10 MIN: CPT | Mod: 93

## 2025-03-05 RX ORDER — CEFUROXIME AXETIL 500 MG/1
500 TABLET, FILM COATED ORAL
Qty: 28 | Refills: 1 | Status: ACTIVE | COMMUNITY
Start: 2025-03-05 | End: 1900-01-01

## 2025-03-12 ENCOUNTER — APPOINTMENT (OUTPATIENT)
Dept: FAMILY MEDICINE | Facility: CLINIC | Age: 80
End: 2025-03-12
Payer: MEDICARE

## 2025-03-12 DIAGNOSIS — G47.00 INSOMNIA, UNSPECIFIED: ICD-10-CM

## 2025-03-12 DIAGNOSIS — R61 GENERALIZED HYPERHIDROSIS: ICD-10-CM

## 2025-03-12 DIAGNOSIS — F41.1 GENERALIZED ANXIETY DISORDER: ICD-10-CM

## 2025-03-12 PROCEDURE — 99212 OFFICE O/P EST SF 10 MIN: CPT | Mod: 93

## 2025-03-19 ENCOUNTER — APPOINTMENT (OUTPATIENT)
Dept: FAMILY MEDICINE | Facility: CLINIC | Age: 80
End: 2025-03-19
Payer: MEDICARE

## 2025-03-19 VITALS — BODY MASS INDEX: 16.64 KG/M2 | WEIGHT: 100 LBS

## 2025-03-19 DIAGNOSIS — R53.1 WEAKNESS: ICD-10-CM

## 2025-03-19 DIAGNOSIS — M79.10 MYALGIA, UNSPECIFIED SITE: ICD-10-CM

## 2025-03-19 DIAGNOSIS — D64.9 ANEMIA, UNSPECIFIED: ICD-10-CM

## 2025-03-19 DIAGNOSIS — F41.9 ANXIETY DISORDER, UNSPECIFIED: ICD-10-CM

## 2025-03-19 DIAGNOSIS — F32.A ANXIETY DISORDER, UNSPECIFIED: ICD-10-CM

## 2025-03-19 PROCEDURE — 99212 OFFICE O/P EST SF 10 MIN: CPT | Mod: 93

## 2025-03-19 RX ORDER — MIRTAZAPINE 7.5 MG/1
7.5 TABLET, FILM COATED ORAL AT BEDTIME
Refills: 0 | Status: ACTIVE | COMMUNITY
Start: 2025-03-19

## 2025-03-24 ENCOUNTER — LABORATORY RESULT (OUTPATIENT)
Age: 80
End: 2025-03-24

## 2025-03-26 ENCOUNTER — APPOINTMENT (OUTPATIENT)
Dept: FAMILY MEDICINE | Facility: CLINIC | Age: 80
End: 2025-03-26
Payer: MEDICARE

## 2025-03-26 VITALS — WEIGHT: 99 LBS | BODY MASS INDEX: 16.47 KG/M2

## 2025-03-26 DIAGNOSIS — R61 GENERALIZED HYPERHIDROSIS: ICD-10-CM

## 2025-03-26 DIAGNOSIS — B27.90 INFECTIOUS MONONUCLEOSIS, UNSPECIFIED W/OUT COMPLICATION: ICD-10-CM

## 2025-03-26 PROCEDURE — 99212 OFFICE O/P EST SF 10 MIN: CPT | Mod: 93

## 2025-04-02 ENCOUNTER — APPOINTMENT (OUTPATIENT)
Dept: FAMILY MEDICINE | Facility: CLINIC | Age: 80
End: 2025-04-02
Payer: MEDICARE

## 2025-04-02 DIAGNOSIS — J32.9 CHRONIC SINUSITIS, UNSPECIFIED: ICD-10-CM

## 2025-04-02 DIAGNOSIS — R61 GENERALIZED HYPERHIDROSIS: ICD-10-CM

## 2025-04-02 DIAGNOSIS — I10 ESSENTIAL (PRIMARY) HYPERTENSION: ICD-10-CM

## 2025-04-02 DIAGNOSIS — F41.1 GENERALIZED ANXIETY DISORDER: ICD-10-CM

## 2025-04-02 PROCEDURE — 99212 OFFICE O/P EST SF 10 MIN: CPT | Mod: 2W

## 2025-04-16 ENCOUNTER — APPOINTMENT (OUTPATIENT)
Dept: FAMILY MEDICINE | Facility: CLINIC | Age: 80
End: 2025-04-16
Payer: MEDICARE

## 2025-04-16 VITALS — WEIGHT: 101 LBS | BODY MASS INDEX: 16.81 KG/M2

## 2025-04-16 DIAGNOSIS — B27.90 INFECTIOUS MONONUCLEOSIS, UNSPECIFIED W/OUT COMPLICATION: ICD-10-CM

## 2025-04-16 DIAGNOSIS — F41.9 ANXIETY DISORDER, UNSPECIFIED: ICD-10-CM

## 2025-04-16 DIAGNOSIS — F32.A ANXIETY DISORDER, UNSPECIFIED: ICD-10-CM

## 2025-04-16 PROCEDURE — 99212 OFFICE O/P EST SF 10 MIN: CPT | Mod: 93

## 2025-04-23 ENCOUNTER — APPOINTMENT (OUTPATIENT)
Dept: FAMILY MEDICINE | Facility: CLINIC | Age: 80
End: 2025-04-23
Payer: MEDICARE

## 2025-04-23 VITALS — WEIGHT: 105 LBS | BODY MASS INDEX: 17.47 KG/M2

## 2025-04-23 PROCEDURE — 99212 OFFICE O/P EST SF 10 MIN: CPT | Mod: 93

## 2025-04-29 ENCOUNTER — LABORATORY RESULT (OUTPATIENT)
Age: 80
End: 2025-04-29

## 2025-04-30 ENCOUNTER — APPOINTMENT (OUTPATIENT)
Dept: FAMILY MEDICINE | Facility: CLINIC | Age: 80
End: 2025-04-30
Payer: MEDICARE

## 2025-04-30 DIAGNOSIS — J06.9 ACUTE UPPER RESPIRATORY INFECTION, UNSPECIFIED: ICD-10-CM

## 2025-04-30 DIAGNOSIS — R53.83 OTHER FATIGUE: ICD-10-CM

## 2025-04-30 DIAGNOSIS — G47.00 INSOMNIA, UNSPECIFIED: ICD-10-CM

## 2025-04-30 PROCEDURE — 99212 OFFICE O/P EST SF 10 MIN: CPT | Mod: 93

## 2025-05-07 ENCOUNTER — APPOINTMENT (OUTPATIENT)
Dept: FAMILY MEDICINE | Facility: CLINIC | Age: 80
End: 2025-05-07
Payer: MEDICARE

## 2025-05-07 VITALS — WEIGHT: 104.5 LBS | BODY MASS INDEX: 17.39 KG/M2

## 2025-05-07 DIAGNOSIS — B27.90 INFECTIOUS MONONUCLEOSIS, UNSPECIFIED W/OUT COMPLICATION: ICD-10-CM

## 2025-05-07 PROCEDURE — 99212 OFFICE O/P EST SF 10 MIN: CPT | Mod: 93

## 2025-05-14 ENCOUNTER — APPOINTMENT (OUTPATIENT)
Dept: FAMILY MEDICINE | Facility: CLINIC | Age: 80
End: 2025-05-14
Payer: MEDICARE

## 2025-05-14 PROCEDURE — 99212 OFFICE O/P EST SF 10 MIN: CPT | Mod: 93

## 2025-05-21 ENCOUNTER — APPOINTMENT (OUTPATIENT)
Dept: FAMILY MEDICINE | Facility: CLINIC | Age: 80
End: 2025-05-21
Payer: MEDICARE

## 2025-05-21 DIAGNOSIS — R61 GENERALIZED HYPERHIDROSIS: ICD-10-CM

## 2025-05-21 PROCEDURE — 99214 OFFICE O/P EST MOD 30 MIN: CPT | Mod: 93

## 2025-05-28 ENCOUNTER — APPOINTMENT (OUTPATIENT)
Dept: FAMILY MEDICINE | Facility: CLINIC | Age: 80
End: 2025-05-28
Payer: MEDICARE

## 2025-05-28 VITALS — WEIGHT: 96 LBS | BODY MASS INDEX: 15.98 KG/M2

## 2025-05-28 DIAGNOSIS — G47.00 INSOMNIA, UNSPECIFIED: ICD-10-CM

## 2025-05-28 DIAGNOSIS — F41.1 GENERALIZED ANXIETY DISORDER: ICD-10-CM

## 2025-05-28 PROCEDURE — 99214 OFFICE O/P EST MOD 30 MIN: CPT | Mod: 93

## 2025-06-03 ENCOUNTER — LABORATORY RESULT (OUTPATIENT)
Age: 80
End: 2025-06-03

## 2025-06-04 ENCOUNTER — APPOINTMENT (OUTPATIENT)
Dept: FAMILY MEDICINE | Facility: CLINIC | Age: 80
End: 2025-06-04
Payer: MEDICARE

## 2025-06-04 VITALS — BODY MASS INDEX: 16.14 KG/M2 | WEIGHT: 97 LBS

## 2025-06-04 DIAGNOSIS — E78.2 MIXED HYPERLIPIDEMIA: ICD-10-CM

## 2025-06-04 DIAGNOSIS — R53.1 WEAKNESS: ICD-10-CM

## 2025-06-04 PROCEDURE — 99214 OFFICE O/P EST MOD 30 MIN: CPT | Mod: 93

## 2025-06-11 ENCOUNTER — APPOINTMENT (OUTPATIENT)
Dept: FAMILY MEDICINE | Facility: CLINIC | Age: 80
End: 2025-06-11
Payer: MEDICARE

## 2025-06-11 VITALS — WEIGHT: 99 LBS | BODY MASS INDEX: 16.47 KG/M2

## 2025-06-11 PROCEDURE — 99214 OFFICE O/P EST MOD 30 MIN: CPT | Mod: 93

## 2025-06-18 ENCOUNTER — APPOINTMENT (OUTPATIENT)
Dept: FAMILY MEDICINE | Facility: CLINIC | Age: 80
End: 2025-06-18
Payer: MEDICARE

## 2025-06-18 VITALS — WEIGHT: 100 LBS | BODY MASS INDEX: 16.64 KG/M2

## 2025-06-18 PROCEDURE — 99214 OFFICE O/P EST MOD 30 MIN: CPT | Mod: 93

## 2025-06-25 ENCOUNTER — APPOINTMENT (OUTPATIENT)
Dept: FAMILY MEDICINE | Facility: CLINIC | Age: 80
End: 2025-06-25
Payer: MEDICARE

## 2025-06-25 PROCEDURE — 99214 OFFICE O/P EST MOD 30 MIN: CPT | Mod: 93

## 2025-07-02 ENCOUNTER — APPOINTMENT (OUTPATIENT)
Dept: FAMILY MEDICINE | Facility: CLINIC | Age: 80
End: 2025-07-02
Payer: MEDICARE

## 2025-07-02 VITALS — BODY MASS INDEX: 16.97 KG/M2 | WEIGHT: 102 LBS

## 2025-07-02 PROCEDURE — 99214 OFFICE O/P EST MOD 30 MIN: CPT | Mod: 93

## 2025-07-09 ENCOUNTER — APPOINTMENT (OUTPATIENT)
Dept: FAMILY MEDICINE | Facility: CLINIC | Age: 80
End: 2025-07-09
Payer: MEDICARE

## 2025-07-09 VITALS — BODY MASS INDEX: 16.97 KG/M2 | WEIGHT: 102 LBS

## 2025-07-09 PROCEDURE — 99214 OFFICE O/P EST MOD 30 MIN: CPT | Mod: 93

## 2025-07-14 ENCOUNTER — NON-APPOINTMENT (OUTPATIENT)
Age: 80
End: 2025-07-14

## 2025-07-15 ENCOUNTER — NON-APPOINTMENT (OUTPATIENT)
Age: 80
End: 2025-07-15

## 2025-07-16 ENCOUNTER — APPOINTMENT (OUTPATIENT)
Dept: FAMILY MEDICINE | Facility: CLINIC | Age: 80
End: 2025-07-16
Payer: MEDICARE

## 2025-07-16 PROCEDURE — 99214 OFFICE O/P EST MOD 30 MIN: CPT | Mod: 93

## 2025-07-23 ENCOUNTER — APPOINTMENT (OUTPATIENT)
Dept: FAMILY MEDICINE | Facility: CLINIC | Age: 80
End: 2025-07-23
Payer: MEDICARE

## 2025-07-23 DIAGNOSIS — R61 GENERALIZED HYPERHIDROSIS: ICD-10-CM

## 2025-07-23 PROCEDURE — 99214 OFFICE O/P EST MOD 30 MIN: CPT | Mod: 93

## 2025-07-30 ENCOUNTER — APPOINTMENT (OUTPATIENT)
Dept: FAMILY MEDICINE | Facility: CLINIC | Age: 80
End: 2025-07-30
Payer: MEDICARE

## 2025-07-30 VITALS — WEIGHT: 94 LBS | BODY MASS INDEX: 15.64 KG/M2

## 2025-07-30 DIAGNOSIS — K58.2 MIXED IRRITABLE BOWEL SYNDROME: ICD-10-CM

## 2025-07-30 DIAGNOSIS — K21.9 GASTRO-ESOPHAGEAL REFLUX DISEASE W/OUT ESOPHAGITIS: ICD-10-CM

## 2025-07-30 PROCEDURE — 99214 OFFICE O/P EST MOD 30 MIN: CPT | Mod: 93

## 2025-08-02 ENCOUNTER — NON-APPOINTMENT (OUTPATIENT)
Age: 80
End: 2025-08-02

## 2025-08-06 ENCOUNTER — APPOINTMENT (OUTPATIENT)
Dept: FAMILY MEDICINE | Facility: CLINIC | Age: 80
End: 2025-08-06
Payer: MEDICARE

## 2025-08-06 DIAGNOSIS — R53.1 WEAKNESS: ICD-10-CM

## 2025-08-06 DIAGNOSIS — R61 GENERALIZED HYPERHIDROSIS: ICD-10-CM

## 2025-08-06 DIAGNOSIS — K59.09 OTHER CONSTIPATION: ICD-10-CM

## 2025-08-06 DIAGNOSIS — F32.A ANXIETY DISORDER, UNSPECIFIED: ICD-10-CM

## 2025-08-06 DIAGNOSIS — F41.9 ANXIETY DISORDER, UNSPECIFIED: ICD-10-CM

## 2025-08-06 PROCEDURE — 99214 OFFICE O/P EST MOD 30 MIN: CPT | Mod: 93

## 2025-08-13 ENCOUNTER — APPOINTMENT (OUTPATIENT)
Dept: FAMILY MEDICINE | Facility: CLINIC | Age: 80
End: 2025-08-13
Payer: MEDICARE

## 2025-08-13 DIAGNOSIS — G47.00 INSOMNIA, UNSPECIFIED: ICD-10-CM

## 2025-08-13 DIAGNOSIS — B27.90 INFECTIOUS MONONUCLEOSIS, UNSPECIFIED W/OUT COMPLICATION: ICD-10-CM

## 2025-08-13 DIAGNOSIS — F41.1 GENERALIZED ANXIETY DISORDER: ICD-10-CM

## 2025-08-13 PROCEDURE — 99214 OFFICE O/P EST MOD 30 MIN: CPT | Mod: 93

## 2025-08-20 ENCOUNTER — APPOINTMENT (OUTPATIENT)
Dept: FAMILY MEDICINE | Facility: CLINIC | Age: 80
End: 2025-08-20
Payer: MEDICARE

## 2025-08-20 DIAGNOSIS — R53.1 WEAKNESS: ICD-10-CM

## 2025-08-20 DIAGNOSIS — R61 GENERALIZED HYPERHIDROSIS: ICD-10-CM

## 2025-08-20 PROCEDURE — 99214 OFFICE O/P EST MOD 30 MIN: CPT | Mod: 93

## 2025-08-20 RX ORDER — AMOXICILLIN AND CLAVULANATE POTASSIUM 875; 125 MG/1; MG/1
875-125 TABLET, COATED ORAL
Qty: 28 | Refills: 1 | Status: ACTIVE | COMMUNITY
Start: 2025-08-20 | End: 1900-01-01

## 2025-08-20 RX ORDER — AMOXICILLIN AND CLAVULANATE POTASSIUM 875; 125 MG/1; MG/1
875-125 TABLET, COATED ORAL
Refills: 0 | Status: ACTIVE | COMMUNITY

## 2025-08-27 ENCOUNTER — APPOINTMENT (OUTPATIENT)
Dept: FAMILY MEDICINE | Facility: CLINIC | Age: 80
End: 2025-08-27
Payer: MEDICARE

## 2025-08-27 VITALS — WEIGHT: 97 LBS | BODY MASS INDEX: 16.14 KG/M2

## 2025-08-27 PROCEDURE — 99214 OFFICE O/P EST MOD 30 MIN: CPT | Mod: 93

## 2025-09-03 ENCOUNTER — APPOINTMENT (OUTPATIENT)
Dept: FAMILY MEDICINE | Facility: CLINIC | Age: 80
End: 2025-09-03
Payer: MEDICARE

## 2025-09-03 DIAGNOSIS — G47.00 INSOMNIA, UNSPECIFIED: ICD-10-CM

## 2025-09-03 DIAGNOSIS — R61 GENERALIZED HYPERHIDROSIS: ICD-10-CM

## 2025-09-03 DIAGNOSIS — K59.09 OTHER CONSTIPATION: ICD-10-CM

## 2025-09-03 DIAGNOSIS — E03.9 HYPOTHYROIDISM, UNSPECIFIED: ICD-10-CM

## 2025-09-03 DIAGNOSIS — M79.10 MYALGIA, UNSPECIFIED SITE: ICD-10-CM

## 2025-09-03 PROCEDURE — 99214 OFFICE O/P EST MOD 30 MIN: CPT | Mod: 93

## 2025-09-10 ENCOUNTER — APPOINTMENT (OUTPATIENT)
Dept: FAMILY MEDICINE | Facility: CLINIC | Age: 80
End: 2025-09-10
Payer: MEDICARE

## 2025-09-10 VITALS — BODY MASS INDEX: 15.81 KG/M2 | WEIGHT: 95 LBS

## 2025-09-10 DIAGNOSIS — R53.1 WEAKNESS: ICD-10-CM

## 2025-09-10 PROCEDURE — 99214 OFFICE O/P EST MOD 30 MIN: CPT | Mod: 93

## 2025-09-10 RX ORDER — CEFUROXIME AXETIL 500 MG/1
500 TABLET, FILM COATED ORAL TWICE DAILY
Refills: 0 | Status: ACTIVE | COMMUNITY
Start: 2025-09-10

## 2025-09-17 ENCOUNTER — APPOINTMENT (OUTPATIENT)
Dept: FAMILY MEDICINE | Facility: CLINIC | Age: 80
End: 2025-09-17
Payer: MEDICARE

## 2025-09-17 VITALS — WEIGHT: 95 LBS | BODY MASS INDEX: 15.81 KG/M2

## 2025-09-17 DIAGNOSIS — F41.9 ANXIETY DISORDER, UNSPECIFIED: ICD-10-CM

## 2025-09-17 DIAGNOSIS — F32.A ANXIETY DISORDER, UNSPECIFIED: ICD-10-CM

## 2025-09-17 PROCEDURE — 99214 OFFICE O/P EST MOD 30 MIN: CPT | Mod: 93

## 2025-09-17 RX ORDER — SULFAMETHOXAZOLE AND TRIMETHOPRIM 800; 160 MG/1; MG/1
800-160 TABLET ORAL TWICE DAILY
Refills: 0 | Status: ACTIVE | COMMUNITY